# Patient Record
Sex: FEMALE | Race: BLACK OR AFRICAN AMERICAN | NOT HISPANIC OR LATINO | Employment: FULL TIME
[De-identification: names, ages, dates, MRNs, and addresses within clinical notes are randomized per-mention and may not be internally consistent; named-entity substitution may affect disease eponyms.]

---

## 2017-09-28 ENCOUNTER — COMMUNICATION - HEALTHEAST (OUTPATIENT)
Dept: INFUSION THERAPY | Age: 37
End: 2017-09-28

## 2017-09-28 ENCOUNTER — COMMUNICATION - HEALTHEAST (OUTPATIENT)
Dept: INFUSION THERAPY | Facility: HOSPITAL | Age: 37
End: 2017-09-28

## 2017-09-28 ENCOUNTER — INFUSION - HEALTHEAST (OUTPATIENT)
Dept: INFUSION THERAPY | Age: 37
End: 2017-09-28

## 2017-09-28 DIAGNOSIS — N93.9 VAGINAL BLEEDING: ICD-10-CM

## 2017-09-28 DIAGNOSIS — D64.89 ANEMIA DUE TO OTHER CAUSE: ICD-10-CM

## 2017-09-28 LAB — ANTIBODY SCREEN: NORMAL

## 2017-09-29 ENCOUNTER — INFUSION - HEALTHEAST (OUTPATIENT)
Dept: INFUSION THERAPY | Age: 37
End: 2017-09-29

## 2017-09-29 DIAGNOSIS — D64.89 ANEMIA DUE TO OTHER CAUSE: ICD-10-CM

## 2017-09-29 DIAGNOSIS — N93.9 VAGINAL BLEEDING: ICD-10-CM

## 2017-09-29 LAB
BLD PROD TYP BPU: NORMAL
BLOOD EXPIRATION DATE: NORMAL
BLOOD TYPE: 6200
CODING SYSTEM: NORMAL
COMPONENT (HISTORICAL CONVERSION): NORMAL
CROSSMATCH: NORMAL
ISSUE DATE AND TIME: NORMAL
STATUS (HISTORICAL CONVERSION): NORMAL
UNIT ABO/RH (HISTORICAL CONVERSION): NORMAL
UNIT NUMBER: NORMAL

## 2017-12-07 ENCOUNTER — RECORDS - HEALTHEAST (OUTPATIENT)
Dept: ADMINISTRATIVE | Facility: OTHER | Age: 37
End: 2017-12-07

## 2017-12-07 ENCOUNTER — OFFICE VISIT - HEALTHEAST (OUTPATIENT)
Dept: INTERNAL MEDICINE | Facility: CLINIC | Age: 37
End: 2017-12-07

## 2017-12-07 ENCOUNTER — AMBULATORY - HEALTHEAST (OUTPATIENT)
Dept: INTERNAL MEDICINE | Facility: CLINIC | Age: 37
End: 2017-12-07

## 2017-12-07 DIAGNOSIS — D50.9 MICROCYTIC ANEMIA: ICD-10-CM

## 2017-12-07 DIAGNOSIS — R30.9 PAINFUL URINATION: ICD-10-CM

## 2017-12-07 DIAGNOSIS — N92.0 HEAVY MENSES: ICD-10-CM

## 2017-12-07 DIAGNOSIS — Z72.0 TOBACCO ABUSE: ICD-10-CM

## 2017-12-07 DIAGNOSIS — D51.0 PERNICIOUS ANEMIA: ICD-10-CM

## 2017-12-07 ASSESSMENT — MIFFLIN-ST. JEOR: SCORE: 1325.86

## 2017-12-08 ENCOUNTER — AMBULATORY - HEALTHEAST (OUTPATIENT)
Dept: INTERNAL MEDICINE | Facility: CLINIC | Age: 37
End: 2017-12-08

## 2017-12-08 ENCOUNTER — AMBULATORY - HEALTHEAST (OUTPATIENT)
Dept: NURSING | Facility: CLINIC | Age: 37
End: 2017-12-08

## 2017-12-08 ENCOUNTER — COMMUNICATION - HEALTHEAST (OUTPATIENT)
Dept: TELEHEALTH | Facility: CLINIC | Age: 37
End: 2017-12-08

## 2017-12-08 DIAGNOSIS — D51.0 PERNICIOUS ANEMIA: ICD-10-CM

## 2017-12-11 ENCOUNTER — AMBULATORY - HEALTHEAST (OUTPATIENT)
Dept: NURSING | Facility: CLINIC | Age: 37
End: 2017-12-11

## 2017-12-11 LAB
TTG IGA SER-ACNC: 0.4 U/ML
TTG IGG SER-ACNC: <0.6 U/ML

## 2017-12-12 ENCOUNTER — AMBULATORY - HEALTHEAST (OUTPATIENT)
Dept: NURSING | Facility: CLINIC | Age: 37
End: 2017-12-12

## 2017-12-13 ENCOUNTER — AMBULATORY - HEALTHEAST (OUTPATIENT)
Dept: NURSING | Facility: CLINIC | Age: 37
End: 2017-12-13

## 2017-12-14 ENCOUNTER — COMMUNICATION - HEALTHEAST (OUTPATIENT)
Dept: INFUSION THERAPY | Age: 37
End: 2017-12-14

## 2017-12-14 ENCOUNTER — AMBULATORY - HEALTHEAST (OUTPATIENT)
Dept: NURSING | Facility: CLINIC | Age: 37
End: 2017-12-14

## 2017-12-14 ENCOUNTER — COMMUNICATION - HEALTHEAST (OUTPATIENT)
Dept: INTERNAL MEDICINE | Facility: CLINIC | Age: 37
End: 2017-12-14

## 2017-12-15 ENCOUNTER — AMBULATORY - HEALTHEAST (OUTPATIENT)
Dept: NURSING | Facility: CLINIC | Age: 37
End: 2017-12-15

## 2017-12-15 DIAGNOSIS — D51.0 PERNICIOUS ANEMIA: ICD-10-CM

## 2018-01-04 ENCOUNTER — AMBULATORY - HEALTHEAST (OUTPATIENT)
Dept: NURSING | Facility: CLINIC | Age: 38
End: 2018-01-04

## 2018-01-04 ENCOUNTER — AMBULATORY - HEALTHEAST (OUTPATIENT)
Dept: INTERNAL MEDICINE | Facility: CLINIC | Age: 38
End: 2018-01-04

## 2018-01-04 DIAGNOSIS — D51.0 PERNICIOUS ANEMIA: ICD-10-CM

## 2018-01-12 ENCOUNTER — AMBULATORY - HEALTHEAST (OUTPATIENT)
Dept: NURSING | Facility: CLINIC | Age: 38
End: 2018-01-12

## 2018-01-16 ENCOUNTER — OFFICE VISIT - HEALTHEAST (OUTPATIENT)
Dept: INTERNAL MEDICINE | Facility: CLINIC | Age: 38
End: 2018-01-16

## 2018-01-16 DIAGNOSIS — Z72.0 TOBACCO ABUSE: ICD-10-CM

## 2018-01-16 DIAGNOSIS — J06.9 URI (UPPER RESPIRATORY INFECTION): ICD-10-CM

## 2018-01-16 DIAGNOSIS — D51.0 PERNICIOUS ANEMIA: ICD-10-CM

## 2018-01-16 ASSESSMENT — MIFFLIN-ST. JEOR: SCORE: 1325.86

## 2018-04-02 ENCOUNTER — AMBULATORY - HEALTHEAST (OUTPATIENT)
Dept: NURSING | Facility: CLINIC | Age: 38
End: 2018-04-02

## 2018-04-02 ENCOUNTER — AMBULATORY - HEALTHEAST (OUTPATIENT)
Dept: INTERNAL MEDICINE | Facility: CLINIC | Age: 38
End: 2018-04-02

## 2018-04-02 ENCOUNTER — AMBULATORY - HEALTHEAST (OUTPATIENT)
Dept: LAB | Facility: CLINIC | Age: 38
End: 2018-04-02

## 2018-04-02 DIAGNOSIS — D64.9 ANEMIA: ICD-10-CM

## 2018-04-02 DIAGNOSIS — D50.9 IRON DEFICIENCY ANEMIA: ICD-10-CM

## 2018-04-02 LAB
BASOPHILS # BLD AUTO: 0 THOU/UL (ref 0–0.2)
BASOPHILS NFR BLD AUTO: 0 % (ref 0–2)
EOSINOPHIL # BLD AUTO: 0.1 THOU/UL (ref 0–0.4)
EOSINOPHIL NFR BLD AUTO: 3 % (ref 0–6)
ERYTHROCYTE [DISTWIDTH] IN BLOOD BY AUTOMATED COUNT: 18.9 % (ref 11–14.5)
FERRITIN SERPL-MCNC: 3 NG/ML (ref 10–130)
HCT VFR BLD AUTO: 22.6 % (ref 35–47)
HGB BLD-MCNC: 6.5 G/DL (ref 12–16)
LYMPHOCYTES # BLD AUTO: 1.9 THOU/UL (ref 0.8–4.4)
LYMPHOCYTES NFR BLD AUTO: 40 % (ref 20–40)
MCH RBC QN AUTO: 18.6 PG (ref 27–34)
MCHC RBC AUTO-ENTMCNC: 28.9 G/DL (ref 32–36)
MCV RBC AUTO: 64 FL (ref 80–100)
MONOCYTES # BLD AUTO: 0.5 THOU/UL (ref 0–0.9)
MONOCYTES NFR BLD AUTO: 10 % (ref 2–10)
NEUTROPHILS # BLD AUTO: 2.2 THOU/UL (ref 2–7.7)
NEUTROPHILS NFR BLD AUTO: 48 % (ref 50–70)
PLATELET # BLD AUTO: 314 THOU/UL (ref 140–440)
PMV BLD AUTO: 10 FL (ref 7–10)
RBC # BLD AUTO: 3.52 MILL/UL (ref 3.8–5.4)
WBC: 4.7 THOU/UL (ref 4–11)

## 2018-04-03 ENCOUNTER — COMMUNICATION - HEALTHEAST (OUTPATIENT)
Dept: INFUSION THERAPY | Facility: HOSPITAL | Age: 38
End: 2018-04-03

## 2018-04-04 LAB
GLIADIN IGA SER-ACNC: 6.9 U/ML
GLIADIN IGG SER-ACNC: <0.4 U/ML
IGA SERPL-MCNC: 247 MG/DL (ref 65–400)
TTG IGA SER-ACNC: 0.5 U/ML
TTG IGG SER-ACNC: <0.6 U/ML

## 2018-04-05 ENCOUNTER — COMMUNICATION - HEALTHEAST (OUTPATIENT)
Dept: ONCOLOGY | Facility: CLINIC | Age: 38
End: 2018-04-05

## 2018-04-05 ENCOUNTER — AMBULATORY - HEALTHEAST (OUTPATIENT)
Dept: INTERNAL MEDICINE | Facility: CLINIC | Age: 38
End: 2018-04-05

## 2018-05-14 ENCOUNTER — OFFICE VISIT - HEALTHEAST (OUTPATIENT)
Dept: INTERNAL MEDICINE | Facility: CLINIC | Age: 38
End: 2018-05-14

## 2018-05-14 ENCOUNTER — AMBULATORY - HEALTHEAST (OUTPATIENT)
Dept: INTERNAL MEDICINE | Facility: CLINIC | Age: 38
End: 2018-05-14

## 2018-05-14 DIAGNOSIS — T14.8XXA MUSCLE STRAIN: ICD-10-CM

## 2018-05-14 ASSESSMENT — MIFFLIN-ST. JEOR: SCORE: 1330.39

## 2018-06-14 ENCOUNTER — HOSPITAL ENCOUNTER (OUTPATIENT)
Dept: CT IMAGING | Facility: CLINIC | Age: 38
Discharge: HOME | End: 2018-06-14
Attending: INTERNAL MEDICINE

## 2018-06-14 ENCOUNTER — OFFICE VISIT - HEALTHEAST (OUTPATIENT)
Dept: INTERNAL MEDICINE | Facility: CLINIC | Age: 38
End: 2018-06-14

## 2018-06-14 DIAGNOSIS — R10.31 RLQ ABDOMINAL PAIN: ICD-10-CM

## 2018-06-14 LAB — WBC: 5.4 THOU/UL (ref 4–11)

## 2018-06-14 ASSESSMENT — MIFFLIN-ST. JEOR: SCORE: 1339.46

## 2018-07-09 ENCOUNTER — OFFICE VISIT - HEALTHEAST (OUTPATIENT)
Dept: INTERNAL MEDICINE | Facility: CLINIC | Age: 38
End: 2018-07-09

## 2018-07-09 DIAGNOSIS — K80.20 MULTIPLE GALLSTONES: ICD-10-CM

## 2018-07-09 DIAGNOSIS — R10.84 ABDOMINAL PAIN, GENERALIZED: ICD-10-CM

## 2018-07-09 LAB
ALBUMIN UR-MCNC: ABNORMAL MG/DL
APPEARANCE UR: ABNORMAL
BACTERIA #/AREA URNS HPF: ABNORMAL HPF
BILIRUB UR QL STRIP: ABNORMAL
COLOR UR AUTO: YELLOW
GLUCOSE UR STRIP-MCNC: NEGATIVE MG/DL
HGB UR QL STRIP: NEGATIVE
KETONES UR STRIP-MCNC: ABNORMAL MG/DL
LEUKOCYTE ESTERASE UR QL STRIP: ABNORMAL
MUCOUS THREADS #/AREA URNS LPF: ABNORMAL LPF
NITRATE UR QL: NEGATIVE
PH UR STRIP: 7 [PH] (ref 5–8)
RBC #/AREA URNS AUTO: ABNORMAL HPF
SP GR UR STRIP: 1.02 (ref 1–1.03)
SQUAMOUS #/AREA URNS AUTO: ABNORMAL LPF
UROBILINOGEN UR STRIP-ACNC: ABNORMAL
WBC #/AREA URNS AUTO: ABNORMAL HPF

## 2018-07-09 ASSESSMENT — MIFFLIN-ST. JEOR: SCORE: 1339.46

## 2018-07-10 ENCOUNTER — AMBULATORY - HEALTHEAST (OUTPATIENT)
Dept: NURSING | Facility: CLINIC | Age: 38
End: 2018-07-10

## 2018-07-10 LAB
ALBUMIN SERPL-MCNC: 3.9 G/DL (ref 3.5–5)
ALP SERPL-CCNC: 39 U/L (ref 45–120)
ALT SERPL W P-5'-P-CCNC: <9 U/L (ref 0–45)
AMYLASE SERPL-CCNC: 53 U/L (ref 5–120)
ANION GAP SERPL CALCULATED.3IONS-SCNC: 9 MMOL/L (ref 5–18)
AST SERPL W P-5'-P-CCNC: 12 U/L (ref 0–40)
BACTERIA SPEC CULT: NO GROWTH
BILIRUB DIRECT SERPL-MCNC: 0.1 MG/DL
BILIRUB SERPL-MCNC: 0.3 MG/DL (ref 0–1)
BUN SERPL-MCNC: 13 MG/DL (ref 8–22)
CALCIUM SERPL-MCNC: 9.3 MG/DL (ref 8.5–10.5)
CHLORIDE BLD-SCNC: 108 MMOL/L (ref 98–107)
CO2 SERPL-SCNC: 22 MMOL/L (ref 22–31)
CREAT SERPL-MCNC: 0.69 MG/DL (ref 0.6–1.1)
ERYTHROCYTE [DISTWIDTH] IN BLOOD BY AUTOMATED COUNT: 21.2 % (ref 11–14.5)
GFR SERPL CREATININE-BSD FRML MDRD: >60 ML/MIN/1.73M2
GLUCOSE BLD-MCNC: 90 MG/DL (ref 70–125)
HCT VFR BLD AUTO: 22.2 % (ref 35–47)
HGB BLD-MCNC: 5.9 G/DL (ref 12–16)
LIPASE SERPL-CCNC: 20 U/L (ref 0–52)
MCH RBC QN AUTO: 18.5 PG (ref 27–34)
MCHC RBC AUTO-ENTMCNC: 26.6 G/DL (ref 32–36)
MCV RBC AUTO: 70 FL (ref 80–100)
PLATELET # BLD AUTO: 235 THOU/UL (ref 140–440)
POTASSIUM BLD-SCNC: 3.9 MMOL/L (ref 3.5–5)
PROT SERPL-MCNC: 7 G/DL (ref 6–8)
RBC # BLD AUTO: 3.19 MILL/UL (ref 3.8–5.4)
SODIUM SERPL-SCNC: 139 MMOL/L (ref 136–145)
WBC: 3.9 THOU/UL (ref 4–11)

## 2018-07-11 ENCOUNTER — COMMUNICATION - HEALTHEAST (OUTPATIENT)
Dept: INTERNAL MEDICINE | Facility: CLINIC | Age: 38
End: 2018-07-11

## 2018-07-11 ENCOUNTER — AMBULATORY - HEALTHEAST (OUTPATIENT)
Dept: INTERNAL MEDICINE | Facility: CLINIC | Age: 38
End: 2018-07-11

## 2018-07-11 DIAGNOSIS — D51.0 PERNICIOUS ANEMIA: ICD-10-CM

## 2018-07-11 DIAGNOSIS — D50.9 IRON DEFICIENCY ANEMIA: ICD-10-CM

## 2018-07-11 LAB
FERRITIN SERPL-MCNC: 2 NG/ML (ref 10–130)
VIT B12 SERPL-MCNC: >2000 PG/ML (ref 213–816)

## 2018-07-13 ENCOUNTER — COMMUNICATION - HEALTHEAST (OUTPATIENT)
Dept: ONCOLOGY | Facility: CLINIC | Age: 38
End: 2018-07-13

## 2018-07-13 LAB — METHYLMALONATE SERPL-SCNC: 0.1 UMOL/L (ref 0–0.4)

## 2018-07-16 ENCOUNTER — RECORDS - HEALTHEAST (OUTPATIENT)
Dept: ADMINISTRATIVE | Facility: OTHER | Age: 38
End: 2018-07-16

## 2018-07-19 ENCOUNTER — COMMUNICATION - HEALTHEAST (OUTPATIENT)
Dept: ONCOLOGY | Facility: HOSPITAL | Age: 38
End: 2018-07-19

## 2018-07-31 ENCOUNTER — AMBULATORY - HEALTHEAST (OUTPATIENT)
Dept: NURSING | Facility: CLINIC | Age: 38
End: 2018-07-31

## 2018-08-03 ENCOUNTER — COMMUNICATION - HEALTHEAST (OUTPATIENT)
Dept: ADMINISTRATIVE | Facility: HOSPITAL | Age: 38
End: 2018-08-03

## 2018-08-09 ENCOUNTER — COMMUNICATION - HEALTHEAST (OUTPATIENT)
Dept: ADMINISTRATIVE | Facility: HOSPITAL | Age: 38
End: 2018-08-09

## 2018-09-07 ENCOUNTER — RECORDS - HEALTHEAST (OUTPATIENT)
Dept: ADMINISTRATIVE | Facility: OTHER | Age: 38
End: 2018-09-07

## 2018-09-13 ENCOUNTER — COMMUNICATION - HEALTHEAST (OUTPATIENT)
Dept: CARE COORDINATION | Facility: CLINIC | Age: 38
End: 2018-09-13

## 2018-09-17 ENCOUNTER — AMBULATORY - HEALTHEAST (OUTPATIENT)
Dept: ONCOLOGY | Facility: HOSPITAL | Age: 38
End: 2018-09-17

## 2018-09-19 ENCOUNTER — OFFICE VISIT - HEALTHEAST (OUTPATIENT)
Dept: INTERNAL MEDICINE | Facility: CLINIC | Age: 38
End: 2018-09-19

## 2018-09-19 DIAGNOSIS — D51.0 PERNICIOUS ANEMIA: ICD-10-CM

## 2018-09-19 DIAGNOSIS — D50.0 IRON DEFICIENCY ANEMIA DUE TO CHRONIC BLOOD LOSS: ICD-10-CM

## 2018-09-19 DIAGNOSIS — N92.0 HEAVY MENSES: ICD-10-CM

## 2018-09-19 LAB
HGB BLD-MCNC: 6 G/DL (ref 12–16)
RETICS # AUTO: 0.11 MILL/UL (ref 0.01–0.11)

## 2018-09-19 ASSESSMENT — MIFFLIN-ST. JEOR: SCORE: 1300.91

## 2018-10-08 ENCOUNTER — COMMUNICATION - HEALTHEAST (OUTPATIENT)
Dept: INTERNAL MEDICINE | Facility: CLINIC | Age: 38
End: 2018-10-08

## 2019-01-24 ENCOUNTER — OFFICE VISIT - HEALTHEAST (OUTPATIENT)
Dept: INTERNAL MEDICINE | Facility: CLINIC | Age: 39
End: 2019-01-24

## 2019-01-24 DIAGNOSIS — D64.9 SEVERE ANEMIA: ICD-10-CM

## 2019-01-24 DIAGNOSIS — R51.9 ACUTE INTRACTABLE HEADACHE, UNSPECIFIED HEADACHE TYPE: ICD-10-CM

## 2019-01-24 DIAGNOSIS — N92.1 MENOMETRORRHAGIA: ICD-10-CM

## 2019-01-24 LAB
BASOPHILS # BLD AUTO: 0 THOU/UL (ref 0–0.2)
BASOPHILS NFR BLD AUTO: 1 % (ref 0–2)
EOSINOPHIL # BLD AUTO: 0.1 THOU/UL (ref 0–0.4)
EOSINOPHIL NFR BLD AUTO: 3 % (ref 0–6)
ERYTHROCYTE [DISTWIDTH] IN BLOOD BY AUTOMATED COUNT: 20.3 % (ref 11–14.5)
FERRITIN SERPL-MCNC: <2 NG/ML (ref 10–130)
HCT VFR BLD AUTO: 23.8 % (ref 35–47)
HGB BLD-MCNC: 6.2 G/DL (ref 12–16)
LYMPHOCYTES # BLD AUTO: 1.4 THOU/UL (ref 0.8–4.4)
LYMPHOCYTES NFR BLD AUTO: 37 % (ref 20–40)
MCH RBC QN AUTO: 18.1 PG (ref 27–34)
MCHC RBC AUTO-ENTMCNC: 26.1 G/DL (ref 32–36)
MCV RBC AUTO: 69 FL (ref 80–100)
MONOCYTES # BLD AUTO: 0.5 THOU/UL (ref 0–0.9)
MONOCYTES NFR BLD AUTO: 13 % (ref 2–10)
NEUTROPHILS # BLD AUTO: 1.6 THOU/UL (ref 2–7.7)
NEUTROPHILS NFR BLD AUTO: 46 % (ref 50–70)
OVALOCYTES: ABNORMAL
PLAT MORPH BLD: NORMAL
PLATELET # BLD AUTO: 208 THOU/UL (ref 140–440)
PMV BLD AUTO: ABNORMAL FL (ref 8.5–12.5)
POLYCHROMASIA BLD QL SMEAR: ABNORMAL
RBC # BLD AUTO: 3.43 MILL/UL (ref 3.8–5.4)
TARGETS BLD QL SMEAR: ABNORMAL
TEAR DROP: ABNORMAL
VIT B12 SERPL-MCNC: 255 PG/ML (ref 213–816)
WBC: 3.6 THOU/UL (ref 4–11)

## 2019-01-24 ASSESSMENT — MIFFLIN-ST. JEOR: SCORE: 1300.91

## 2019-01-25 ENCOUNTER — COMMUNICATION - HEALTHEAST (OUTPATIENT)
Dept: INTERNAL MEDICINE | Facility: CLINIC | Age: 39
End: 2019-01-25

## 2019-02-13 ENCOUNTER — AMBULATORY - HEALTHEAST (OUTPATIENT)
Dept: INTERNAL MEDICINE | Facility: CLINIC | Age: 39
End: 2019-02-13

## 2019-02-13 DIAGNOSIS — E53.8 VITAMIN B12 DEFICIENCY (NON ANEMIC): ICD-10-CM

## 2019-02-13 DIAGNOSIS — E53.8 VITAMIN B12 DEFICIENCY: ICD-10-CM

## 2019-02-21 ENCOUNTER — COMMUNICATION - HEALTHEAST (OUTPATIENT)
Dept: INTERNAL MEDICINE | Facility: CLINIC | Age: 39
End: 2019-02-21

## 2019-02-21 DIAGNOSIS — D50.0 IRON DEFICIENCY ANEMIA DUE TO CHRONIC BLOOD LOSS: ICD-10-CM

## 2019-03-12 ENCOUNTER — OFFICE VISIT - HEALTHEAST (OUTPATIENT)
Dept: INTERNAL MEDICINE | Facility: CLINIC | Age: 39
End: 2019-03-12

## 2019-03-12 DIAGNOSIS — N92.0 MENORRHAGIA WITH REGULAR CYCLE: ICD-10-CM

## 2019-03-12 DIAGNOSIS — K90.9 IRON MALABSORPTION: ICD-10-CM

## 2019-03-12 DIAGNOSIS — D50.0 IRON DEFICIENCY ANEMIA DUE TO CHRONIC BLOOD LOSS: ICD-10-CM

## 2019-03-12 DIAGNOSIS — D51.0 PERNICIOUS ANEMIA: ICD-10-CM

## 2019-03-12 ASSESSMENT — MIFFLIN-ST. JEOR: SCORE: 1282.77

## 2019-03-14 ENCOUNTER — COMMUNICATION - HEALTHEAST (OUTPATIENT)
Dept: INFUSION THERAPY | Facility: HOSPITAL | Age: 39
End: 2019-03-14

## 2019-03-14 ENCOUNTER — INFUSION - HEALTHEAST (OUTPATIENT)
Dept: INFUSION THERAPY | Facility: HOSPITAL | Age: 39
End: 2019-03-14

## 2019-03-14 DIAGNOSIS — D50.0 IRON DEFICIENCY ANEMIA DUE TO CHRONIC BLOOD LOSS: ICD-10-CM

## 2019-03-14 LAB
ERYTHROCYTE [DISTWIDTH] IN BLOOD BY AUTOMATED COUNT: 20 % (ref 11–14.5)
HCT VFR BLD AUTO: 20.1 % (ref 35–47)
HGB BLD-MCNC: 5.3 G/DL (ref 12–16)
MCH RBC QN AUTO: 17.5 PG (ref 27–34)
MCHC RBC AUTO-ENTMCNC: 26.4 G/DL (ref 32–36)
MCV RBC AUTO: 67 FL (ref 80–100)
PLATELET # BLD AUTO: 183 THOU/UL (ref 140–440)
PMV BLD AUTO: 11 FL (ref 8.5–12.5)
RBC # BLD AUTO: 3.02 MILL/UL (ref 3.8–5.4)
WBC: 3.6 THOU/UL (ref 4–11)

## 2019-03-14 ASSESSMENT — MIFFLIN-ST. JEOR: SCORE: 1308.85

## 2019-03-15 ENCOUNTER — INFUSION - HEALTHEAST (OUTPATIENT)
Dept: INFUSION THERAPY | Facility: HOSPITAL | Age: 39
End: 2019-03-15

## 2019-03-15 ENCOUNTER — OFFICE VISIT - HEALTHEAST (OUTPATIENT)
Dept: ONCOLOGY | Facility: HOSPITAL | Age: 39
End: 2019-03-15

## 2019-03-15 DIAGNOSIS — D50.0 IRON DEFICIENCY ANEMIA DUE TO CHRONIC BLOOD LOSS: ICD-10-CM

## 2019-03-15 DIAGNOSIS — K90.9 IRON MALABSORPTION: ICD-10-CM

## 2019-03-15 DIAGNOSIS — T50.995A ADVERSE EFFECT OF OTHER DRUGS, MEDICAMENTS AND BIOLOGICAL SUBSTANCES, INITIAL ENCOUNTER: ICD-10-CM

## 2019-03-15 DIAGNOSIS — D50.9 MICROCYTIC ANEMIA: ICD-10-CM

## 2019-03-15 LAB
FERRITIN SERPL-MCNC: 28 NG/ML (ref 10–130)
IRON SATN MFR SERPL: 14 % (ref 20–50)
IRON SERPL-MCNC: 66 UG/DL (ref 42–175)
TIBC SERPL-MCNC: 456 UG/DL (ref 313–563)
TRANSFERRIN SERPL-MCNC: 364 MG/DL (ref 212–360)

## 2019-03-19 ENCOUNTER — OFFICE VISIT - HEALTHEAST (OUTPATIENT)
Dept: INTERNAL MEDICINE | Facility: CLINIC | Age: 39
End: 2019-03-19

## 2019-03-19 ENCOUNTER — INFUSION - HEALTHEAST (OUTPATIENT)
Dept: INFUSION THERAPY | Facility: HOSPITAL | Age: 39
End: 2019-03-19

## 2019-03-19 DIAGNOSIS — D50.0 IRON DEFICIENCY ANEMIA DUE TO CHRONIC BLOOD LOSS: ICD-10-CM

## 2019-03-19 DIAGNOSIS — D64.9 SEVERE ANEMIA: ICD-10-CM

## 2019-03-19 DIAGNOSIS — D51.0 PERNICIOUS ANEMIA: ICD-10-CM

## 2019-03-19 DIAGNOSIS — T50.995A ADVERSE EFFECT OF OTHER DRUGS, MEDICAMENTS AND BIOLOGICAL SUBSTANCES, INITIAL ENCOUNTER: ICD-10-CM

## 2019-03-19 DIAGNOSIS — K90.9 IRON MALABSORPTION: ICD-10-CM

## 2019-03-19 ASSESSMENT — MIFFLIN-ST. JEOR: SCORE: 1297.51

## 2019-03-21 ENCOUNTER — INFUSION - HEALTHEAST (OUTPATIENT)
Dept: INFUSION THERAPY | Facility: HOSPITAL | Age: 39
End: 2019-03-21

## 2019-03-21 DIAGNOSIS — T50.995A ADVERSE EFFECT OF OTHER DRUGS, MEDICAMENTS AND BIOLOGICAL SUBSTANCES, INITIAL ENCOUNTER: ICD-10-CM

## 2019-03-21 DIAGNOSIS — D50.0 IRON DEFICIENCY ANEMIA DUE TO CHRONIC BLOOD LOSS: ICD-10-CM

## 2019-03-21 DIAGNOSIS — K90.9 IRON MALABSORPTION: ICD-10-CM

## 2019-03-22 ENCOUNTER — AMBULATORY - HEALTHEAST (OUTPATIENT)
Dept: ONCOLOGY | Facility: HOSPITAL | Age: 39
End: 2019-03-22

## 2019-03-25 ENCOUNTER — INFUSION - HEALTHEAST (OUTPATIENT)
Dept: INFUSION THERAPY | Facility: HOSPITAL | Age: 39
End: 2019-03-25

## 2019-03-25 DIAGNOSIS — K90.9 IRON MALABSORPTION: ICD-10-CM

## 2019-03-25 DIAGNOSIS — D50.0 IRON DEFICIENCY ANEMIA DUE TO CHRONIC BLOOD LOSS: ICD-10-CM

## 2019-03-25 DIAGNOSIS — T50.995A ADVERSE EFFECT OF OTHER DRUGS, MEDICAMENTS AND BIOLOGICAL SUBSTANCES, INITIAL ENCOUNTER: ICD-10-CM

## 2019-03-26 ENCOUNTER — COMMUNICATION - HEALTHEAST (OUTPATIENT)
Dept: ADMINISTRATIVE | Facility: HOSPITAL | Age: 39
End: 2019-03-26

## 2019-03-26 ENCOUNTER — COMMUNICATION - HEALTHEAST (OUTPATIENT)
Dept: INTERNAL MEDICINE | Facility: CLINIC | Age: 39
End: 2019-03-26

## 2019-04-01 ENCOUNTER — INFUSION - HEALTHEAST (OUTPATIENT)
Dept: INFUSION THERAPY | Facility: HOSPITAL | Age: 39
End: 2019-04-01

## 2019-04-01 DIAGNOSIS — T50.995A ADVERSE EFFECT OF OTHER DRUGS, MEDICAMENTS AND BIOLOGICAL SUBSTANCES, INITIAL ENCOUNTER: ICD-10-CM

## 2019-04-01 DIAGNOSIS — D50.0 IRON DEFICIENCY ANEMIA DUE TO CHRONIC BLOOD LOSS: ICD-10-CM

## 2019-04-01 DIAGNOSIS — K90.9 IRON MALABSORPTION: ICD-10-CM

## 2019-04-03 ENCOUNTER — COMMUNICATION - HEALTHEAST (OUTPATIENT)
Dept: INTERNAL MEDICINE | Facility: CLINIC | Age: 39
End: 2019-04-03

## 2019-04-04 ENCOUNTER — COMMUNICATION - HEALTHEAST (OUTPATIENT)
Dept: ADMINISTRATIVE | Facility: HOSPITAL | Age: 39
End: 2019-04-04

## 2019-04-05 ENCOUNTER — AMBULATORY - HEALTHEAST (OUTPATIENT)
Dept: LAB | Facility: CLINIC | Age: 39
End: 2019-04-05

## 2019-04-05 DIAGNOSIS — D50.9 MICROCYTIC ANEMIA: ICD-10-CM

## 2019-04-05 LAB
ERYTHROCYTE [DISTWIDTH] IN BLOOD BY AUTOMATED COUNT: ABNORMAL % (ref 11–14.5)
FERRITIN SERPL-MCNC: 191 NG/ML (ref 10–130)
HCT VFR BLD AUTO: 33.2 % (ref 35–47)
HGB BLD-MCNC: 9.2 G/DL (ref 12–16)
IRON SATN MFR SERPL: 16 % (ref 20–50)
IRON SERPL-MCNC: 59 UG/DL (ref 42–175)
MCH RBC QN AUTO: 23.6 PG (ref 27–34)
MCHC RBC AUTO-ENTMCNC: 27.7 G/DL (ref 32–36)
MCV RBC AUTO: 85 FL (ref 80–100)
PLATELET # BLD AUTO: 224 THOU/UL (ref 140–440)
RBC # BLD AUTO: 3.9 MILL/UL (ref 3.8–5.4)
RETICS # AUTO: 0.05 MILL/UL (ref 0.01–0.11)
TIBC SERPL-MCNC: 365 UG/DL (ref 313–563)
TRANSFERRIN SERPL-MCNC: 292 MG/DL (ref 212–360)
WBC: 3.2 THOU/UL (ref 4–11)

## 2019-04-06 LAB — IF BLOCK AB SER QL RIA: POSITIVE

## 2019-04-10 ENCOUNTER — OFFICE VISIT - HEALTHEAST (OUTPATIENT)
Dept: INTERNAL MEDICINE | Facility: CLINIC | Age: 39
End: 2019-04-10

## 2019-04-10 DIAGNOSIS — Z86.39 HISTORY OF HYPERTHYROIDISM: ICD-10-CM

## 2019-04-10 DIAGNOSIS — N92.0 MENORRHAGIA WITH REGULAR CYCLE: ICD-10-CM

## 2019-04-10 DIAGNOSIS — D50.0 IRON DEFICIENCY ANEMIA DUE TO CHRONIC BLOOD LOSS: ICD-10-CM

## 2019-04-10 DIAGNOSIS — D51.0 PERNICIOUS ANEMIA: ICD-10-CM

## 2019-04-10 ASSESSMENT — MIFFLIN-ST. JEOR: SCORE: 1297.51

## 2019-04-12 ENCOUNTER — OFFICE VISIT - HEALTHEAST (OUTPATIENT)
Dept: ONCOLOGY | Facility: HOSPITAL | Age: 39
End: 2019-04-12

## 2019-04-12 DIAGNOSIS — D51.0 PERNICIOUS ANEMIA: ICD-10-CM

## 2019-04-12 DIAGNOSIS — D50.0 IRON DEFICIENCY ANEMIA DUE TO CHRONIC BLOOD LOSS: ICD-10-CM

## 2019-05-01 ENCOUNTER — AMBULATORY - HEALTHEAST (OUTPATIENT)
Dept: INTERNAL MEDICINE | Facility: CLINIC | Age: 39
End: 2019-05-01

## 2019-05-01 ENCOUNTER — AMBULATORY - HEALTHEAST (OUTPATIENT)
Dept: NURSING | Facility: CLINIC | Age: 39
End: 2019-05-01

## 2019-05-01 DIAGNOSIS — R51.9 ACUTE INTRACTABLE HEADACHE, UNSPECIFIED HEADACHE TYPE: ICD-10-CM

## 2019-05-03 ENCOUNTER — RECORDS - HEALTHEAST (OUTPATIENT)
Dept: ADMINISTRATIVE | Facility: OTHER | Age: 39
End: 2019-05-03

## 2019-05-09 ENCOUNTER — RECORDS - HEALTHEAST (OUTPATIENT)
Dept: ADMINISTRATIVE | Facility: OTHER | Age: 39
End: 2019-05-09

## 2019-05-28 ENCOUNTER — OFFICE VISIT - HEALTHEAST (OUTPATIENT)
Dept: INTERNAL MEDICINE | Facility: CLINIC | Age: 39
End: 2019-05-28

## 2019-05-28 DIAGNOSIS — D50.0 IRON DEFICIENCY ANEMIA DUE TO CHRONIC BLOOD LOSS: ICD-10-CM

## 2019-05-28 DIAGNOSIS — R05.9 COUGH: ICD-10-CM

## 2019-05-28 DIAGNOSIS — Z86.39 HISTORY OF HYPERTHYROIDISM: ICD-10-CM

## 2019-05-28 DIAGNOSIS — Z72.0 TOBACCO ABUSE: ICD-10-CM

## 2019-05-28 DIAGNOSIS — D51.0 PERNICIOUS ANEMIA: ICD-10-CM

## 2019-05-28 LAB
ERYTHROCYTE [DISTWIDTH] IN BLOOD BY AUTOMATED COUNT: 14.7 % (ref 11–14.5)
HCT VFR BLD AUTO: 34 % (ref 35–47)
HGB BLD-MCNC: 11 G/DL (ref 12–16)
MCH RBC QN AUTO: 27.5 PG (ref 27–34)
MCHC RBC AUTO-ENTMCNC: 32.2 G/DL (ref 32–36)
MCV RBC AUTO: 85 FL (ref 80–100)
PLATELET # BLD AUTO: 175 THOU/UL (ref 140–440)
PMV BLD AUTO: 9.6 FL (ref 7–10)
RBC # BLD AUTO: 3.99 MILL/UL (ref 3.8–5.4)
T3 SERPL-MCNC: 133 NG/DL (ref 45–175)
T4 FREE SERPL-MCNC: 0.8 NG/DL (ref 0.7–1.8)
TSH SERPL DL<=0.005 MIU/L-ACNC: 1.54 UIU/ML (ref 0.3–5)
WBC: 3.8 THOU/UL (ref 4–11)

## 2019-05-28 ASSESSMENT — MIFFLIN-ST. JEOR: SCORE: 1351.94

## 2019-05-29 ENCOUNTER — COMMUNICATION - HEALTHEAST (OUTPATIENT)
Dept: INTERNAL MEDICINE | Facility: CLINIC | Age: 39
End: 2019-05-29

## 2019-08-15 ENCOUNTER — COMMUNICATION - HEALTHEAST (OUTPATIENT)
Dept: INTERNAL MEDICINE | Facility: CLINIC | Age: 39
End: 2019-08-15

## 2019-08-15 DIAGNOSIS — B96.89 BACTERIAL VAGINOSIS: ICD-10-CM

## 2019-08-15 DIAGNOSIS — N76.0 BACTERIAL VAGINOSIS: ICD-10-CM

## 2019-09-25 ENCOUNTER — OFFICE VISIT - HEALTHEAST (OUTPATIENT)
Dept: INTERNAL MEDICINE | Facility: CLINIC | Age: 39
End: 2019-09-25

## 2019-09-25 DIAGNOSIS — F43.21 GRIEF REACTION: ICD-10-CM

## 2019-09-25 ASSESSMENT — MIFFLIN-ST. JEOR: SCORE: 1347.4

## 2019-09-26 ENCOUNTER — OFFICE VISIT - HEALTHEAST (OUTPATIENT)
Dept: BEHAVIORAL HEALTH | Facility: CLINIC | Age: 39
End: 2019-09-26

## 2019-09-26 DIAGNOSIS — F43.23 ADJUSTMENT DISORDER WITH MIXED ANXIETY AND DEPRESSED MOOD: ICD-10-CM

## 2019-09-26 ASSESSMENT — ANXIETY QUESTIONNAIRES
4. TROUBLE RELAXING: NEARLY EVERY DAY
IF YOU CHECKED OFF ANY PROBLEMS ON THIS QUESTIONNAIRE, HOW DIFFICULT HAVE THESE PROBLEMS MADE IT FOR YOU TO DO YOUR WORK, TAKE CARE OF THINGS AT HOME, OR GET ALONG WITH OTHER PEOPLE: EXTREMELY DIFFICULT
5. BEING SO RESTLESS THAT IT IS HARD TO SIT STILL: NEARLY EVERY DAY
6. BECOMING EASILY ANNOYED OR IRRITABLE: NEARLY EVERY DAY
3. WORRYING TOO MUCH ABOUT DIFFERENT THINGS: NEARLY EVERY DAY
7. FEELING AFRAID AS IF SOMETHING AWFUL MIGHT HAPPEN: NEARLY EVERY DAY
1. FEELING NERVOUS, ANXIOUS, OR ON EDGE: NEARLY EVERY DAY
2. NOT BEING ABLE TO STOP OR CONTROL WORRYING: NEARLY EVERY DAY
GAD7 TOTAL SCORE: 21

## 2019-09-26 ASSESSMENT — PATIENT HEALTH QUESTIONNAIRE - PHQ9: SUM OF ALL RESPONSES TO PHQ QUESTIONS 1-9: 27

## 2019-10-03 ENCOUNTER — OFFICE VISIT - HEALTHEAST (OUTPATIENT)
Dept: BEHAVIORAL HEALTH | Facility: CLINIC | Age: 39
End: 2019-10-03

## 2019-10-03 DIAGNOSIS — F43.23 ADJUSTMENT DISORDER WITH MIXED ANXIETY AND DEPRESSED MOOD: ICD-10-CM

## 2019-10-09 ENCOUNTER — AMBULATORY - HEALTHEAST (OUTPATIENT)
Dept: BEHAVIORAL HEALTH | Facility: CLINIC | Age: 39
End: 2019-10-09

## 2019-10-23 ENCOUNTER — OFFICE VISIT - HEALTHEAST (OUTPATIENT)
Dept: BEHAVIORAL HEALTH | Facility: CLINIC | Age: 39
End: 2019-10-23

## 2019-10-23 DIAGNOSIS — F43.23 ADJUSTMENT DISORDER WITH MIXED ANXIETY AND DEPRESSED MOOD: ICD-10-CM

## 2019-10-31 ENCOUNTER — OFFICE VISIT - HEALTHEAST (OUTPATIENT)
Dept: BEHAVIORAL HEALTH | Facility: CLINIC | Age: 39
End: 2019-10-31

## 2019-10-31 ENCOUNTER — COMMUNICATION - HEALTHEAST (OUTPATIENT)
Dept: BEHAVIORAL HEALTH | Facility: CLINIC | Age: 39
End: 2019-10-31

## 2019-10-31 DIAGNOSIS — F43.23 ADJUSTMENT DISORDER WITH MIXED ANXIETY AND DEPRESSED MOOD: ICD-10-CM

## 2019-11-06 ENCOUNTER — OFFICE VISIT - HEALTHEAST (OUTPATIENT)
Dept: BEHAVIORAL HEALTH | Facility: CLINIC | Age: 39
End: 2019-11-06

## 2019-11-06 DIAGNOSIS — F43.23 ADJUSTMENT DISORDER WITH MIXED ANXIETY AND DEPRESSED MOOD: ICD-10-CM

## 2019-11-06 DIAGNOSIS — F43.10 POSTTRAUMATIC STRESS DISORDER: ICD-10-CM

## 2019-11-08 ENCOUNTER — COMMUNICATION - HEALTHEAST (OUTPATIENT)
Dept: BEHAVIORAL HEALTH | Facility: CLINIC | Age: 39
End: 2019-11-08

## 2019-11-14 ENCOUNTER — OFFICE VISIT - HEALTHEAST (OUTPATIENT)
Dept: BEHAVIORAL HEALTH | Facility: CLINIC | Age: 39
End: 2019-11-14

## 2019-11-14 ENCOUNTER — AMBULATORY - HEALTHEAST (OUTPATIENT)
Dept: BEHAVIORAL HEALTH | Facility: CLINIC | Age: 39
End: 2019-11-14

## 2019-11-14 DIAGNOSIS — F43.10 POSTTRAUMATIC STRESS DISORDER: ICD-10-CM

## 2019-11-14 DIAGNOSIS — F43.23 ADJUSTMENT DISORDER WITH MIXED ANXIETY AND DEPRESSED MOOD: ICD-10-CM

## 2019-11-20 ENCOUNTER — OFFICE VISIT - HEALTHEAST (OUTPATIENT)
Dept: BEHAVIORAL HEALTH | Facility: CLINIC | Age: 39
End: 2019-11-20

## 2019-11-20 DIAGNOSIS — F43.10 POSTTRAUMATIC STRESS DISORDER: ICD-10-CM

## 2019-11-20 DIAGNOSIS — F43.23 ADJUSTMENT DISORDER WITH MIXED ANXIETY AND DEPRESSED MOOD: ICD-10-CM

## 2019-11-20 DIAGNOSIS — F33.1 DEPRESSION, MAJOR, RECURRENT, MODERATE (H): ICD-10-CM

## 2019-12-05 ENCOUNTER — COMMUNICATION - HEALTHEAST (OUTPATIENT)
Dept: BEHAVIORAL HEALTH | Facility: CLINIC | Age: 39
End: 2019-12-05

## 2019-12-05 ENCOUNTER — OFFICE VISIT - HEALTHEAST (OUTPATIENT)
Dept: BEHAVIORAL HEALTH | Facility: CLINIC | Age: 39
End: 2019-12-05

## 2019-12-05 DIAGNOSIS — F43.23 ADJUSTMENT DISORDER WITH MIXED ANXIETY AND DEPRESSED MOOD: ICD-10-CM

## 2019-12-05 DIAGNOSIS — F43.10 POSTTRAUMATIC STRESS DISORDER: ICD-10-CM

## 2019-12-05 DIAGNOSIS — F33.1 DEPRESSION, MAJOR, RECURRENT, MODERATE (H): ICD-10-CM

## 2020-03-06 ENCOUNTER — AMBULATORY - HEALTHEAST (OUTPATIENT)
Dept: LAB | Facility: CLINIC | Age: 40
End: 2020-03-06

## 2020-03-06 ENCOUNTER — COMMUNICATION - HEALTHEAST (OUTPATIENT)
Dept: INTERNAL MEDICINE | Facility: CLINIC | Age: 40
End: 2020-03-06

## 2020-03-06 ENCOUNTER — AMBULATORY - HEALTHEAST (OUTPATIENT)
Dept: INTERNAL MEDICINE | Facility: CLINIC | Age: 40
End: 2020-03-06

## 2020-03-06 DIAGNOSIS — R35.0 URINARY FREQUENCY: ICD-10-CM

## 2020-03-06 LAB
ALBUMIN UR-MCNC: ABNORMAL MG/DL
APPEARANCE UR: ABNORMAL
BACTERIA #/AREA URNS HPF: ABNORMAL HPF
BILIRUB UR QL STRIP: NEGATIVE
COLOR UR AUTO: YELLOW
GLUCOSE UR STRIP-MCNC: NEGATIVE MG/DL
HGB UR QL STRIP: ABNORMAL
KETONES UR STRIP-MCNC: ABNORMAL MG/DL
LEUKOCYTE ESTERASE UR QL STRIP: NEGATIVE
MUCOUS THREADS #/AREA URNS LPF: ABNORMAL LPF
NITRATE UR QL: NEGATIVE
PH UR STRIP: 6 [PH] (ref 5–8)
RBC #/AREA URNS AUTO: ABNORMAL HPF
SP GR UR STRIP: >=1.03 (ref 1–1.03)
SQUAMOUS #/AREA URNS AUTO: ABNORMAL LPF
UROBILINOGEN UR STRIP-ACNC: ABNORMAL
WBC #/AREA URNS AUTO: ABNORMAL HPF

## 2020-04-17 ENCOUNTER — OFFICE VISIT - HEALTHEAST (OUTPATIENT)
Dept: FAMILY MEDICINE | Facility: CLINIC | Age: 40
End: 2020-04-17

## 2020-04-17 DIAGNOSIS — R05.9 COUGH: ICD-10-CM

## 2020-04-18 ENCOUNTER — RECORDS - HEALTHEAST (OUTPATIENT)
Dept: LAB | Facility: CLINIC | Age: 40
End: 2020-04-18

## 2020-04-18 LAB
SARS-COV-2 PCR COMMENT: NORMAL
SARS-COV-2 RNA SPEC QL NAA+PROBE: NEGATIVE
SARS-COV-2 VIRUS SPECIMEN SOURCE: NORMAL

## 2020-05-22 ENCOUNTER — COMMUNICATION - HEALTHEAST (OUTPATIENT)
Dept: INTERNAL MEDICINE | Facility: CLINIC | Age: 40
End: 2020-05-22

## 2020-06-23 ENCOUNTER — OFFICE VISIT - HEALTHEAST (OUTPATIENT)
Dept: INTERNAL MEDICINE | Facility: CLINIC | Age: 40
End: 2020-06-23

## 2020-06-23 ENCOUNTER — COMMUNICATION - HEALTHEAST (OUTPATIENT)
Dept: ADMINISTRATIVE | Facility: CLINIC | Age: 40
End: 2020-06-23

## 2020-06-23 DIAGNOSIS — E05.00 GRAVES DISEASE: ICD-10-CM

## 2020-06-23 ASSESSMENT — PATIENT HEALTH QUESTIONNAIRE - PHQ9: SUM OF ALL RESPONSES TO PHQ QUESTIONS 1-9: 24

## 2020-06-25 ENCOUNTER — COMMUNICATION - HEALTHEAST (OUTPATIENT)
Dept: NURSING | Facility: CLINIC | Age: 40
End: 2020-06-25

## 2020-06-29 ENCOUNTER — OFFICE VISIT - HEALTHEAST (OUTPATIENT)
Dept: INTERNAL MEDICINE | Facility: CLINIC | Age: 40
End: 2020-06-29

## 2020-06-29 DIAGNOSIS — N92.2 EXCESSIVE MENSTRUATION AT PUBERTY: ICD-10-CM

## 2020-06-29 DIAGNOSIS — E05.90 HYPERTHYROIDISM: ICD-10-CM

## 2020-06-29 DIAGNOSIS — R11.2 NAUSEA AND VOMITING, INTRACTABILITY OF VOMITING NOT SPECIFIED, UNSPECIFIED VOMITING TYPE: ICD-10-CM

## 2020-06-29 DIAGNOSIS — D50.0 IRON DEFICIENCY ANEMIA DUE TO CHRONIC BLOOD LOSS: ICD-10-CM

## 2020-06-29 LAB
ALBUMIN SERPL-MCNC: 3.9 G/DL (ref 3.5–5)
ALP SERPL-CCNC: 38 U/L (ref 45–120)
ALT SERPL W P-5'-P-CCNC: 95 U/L (ref 0–45)
ANION GAP SERPL CALCULATED.3IONS-SCNC: 9 MMOL/L (ref 5–18)
AST SERPL W P-5'-P-CCNC: 56 U/L (ref 0–40)
BASOPHILS # BLD AUTO: 0 THOU/UL (ref 0–0.2)
BASOPHILS NFR BLD AUTO: 0 % (ref 0–2)
BILIRUB SERPL-MCNC: 0.2 MG/DL (ref 0–1)
BUN SERPL-MCNC: 13 MG/DL (ref 8–22)
CALCIUM SERPL-MCNC: 9.3 MG/DL (ref 8.5–10.5)
CHLORIDE BLD-SCNC: 106 MMOL/L (ref 98–107)
CO2 SERPL-SCNC: 26 MMOL/L (ref 22–31)
CREAT SERPL-MCNC: 0.68 MG/DL (ref 0.6–1.1)
EOSINOPHIL # BLD AUTO: 0.1 THOU/UL (ref 0–0.4)
EOSINOPHIL NFR BLD AUTO: 2 % (ref 0–6)
ERYTHROCYTE [DISTWIDTH] IN BLOOD BY AUTOMATED COUNT: ABNORMAL %
GFR SERPL CREATININE-BSD FRML MDRD: >60 ML/MIN/1.73M2
GLUCOSE BLD-MCNC: 89 MG/DL (ref 70–125)
HCT VFR BLD AUTO: 31.5 % (ref 35–47)
HGB BLD-MCNC: 8.5 G/DL (ref 12–16)
LYMPHOCYTES # BLD AUTO: 1.2 THOU/UL (ref 0.8–4.4)
LYMPHOCYTES NFR BLD AUTO: 26 % (ref 20–40)
MCH RBC QN AUTO: 23.2 PG (ref 27–34)
MCHC RBC AUTO-ENTMCNC: 27 G/DL (ref 32–36)
MCV RBC AUTO: 86 FL (ref 80–100)
MONOCYTES # BLD AUTO: 0.4 THOU/UL (ref 0–0.9)
MONOCYTES NFR BLD AUTO: 9 % (ref 2–10)
NEUTROPHILS # BLD AUTO: 2.9 THOU/UL (ref 2–7.7)
NEUTROPHILS NFR BLD AUTO: 62 % (ref 50–70)
PLATELET # BLD AUTO: 260 THOU/UL (ref 140–440)
PMV BLD AUTO: ABNORMAL FL
POTASSIUM BLD-SCNC: 4.5 MMOL/L (ref 3.5–5)
PROT SERPL-MCNC: 6.6 G/DL (ref 6–8)
RBC # BLD AUTO: 3.66 MILL/UL (ref 3.8–5.4)
SODIUM SERPL-SCNC: 141 MMOL/L (ref 136–145)
T3 SERPL-MCNC: 166 NG/DL (ref 45–175)
T4 FREE SERPL-MCNC: 1.2 NG/DL (ref 0.7–1.8)
TSH SERPL DL<=0.005 MIU/L-ACNC: 0.02 UIU/ML (ref 0.3–5)
WBC: 4.7 THOU/UL (ref 4–11)

## 2020-06-29 ASSESSMENT — MIFFLIN-ST. JEOR: SCORE: 1303.18

## 2020-06-30 LAB — THYROID PEROXIDASE ANTIBODIES - HISTORICAL: 1207.3 IU/ML (ref 0–5.6)

## 2020-07-06 ENCOUNTER — AMBULATORY - HEALTHEAST (OUTPATIENT)
Dept: INTERNAL MEDICINE | Facility: CLINIC | Age: 40
End: 2020-07-06

## 2020-07-06 ENCOUNTER — COMMUNICATION - HEALTHEAST (OUTPATIENT)
Dept: INTERNAL MEDICINE | Facility: CLINIC | Age: 40
End: 2020-07-06

## 2020-07-06 DIAGNOSIS — E05.90 HYPERTHYROIDISM: ICD-10-CM

## 2020-08-04 ENCOUNTER — COMMUNICATION - HEALTHEAST (OUTPATIENT)
Dept: INTERNAL MEDICINE | Facility: CLINIC | Age: 40
End: 2020-08-04

## 2020-08-04 DIAGNOSIS — K04.7 TOOTH INFECTION: ICD-10-CM

## 2020-08-12 ENCOUNTER — AMBULATORY - HEALTHEAST (OUTPATIENT)
Dept: BEHAVIORAL HEALTH | Facility: CLINIC | Age: 40
End: 2020-08-12

## 2020-09-16 ENCOUNTER — OFFICE VISIT - HEALTHEAST (OUTPATIENT)
Dept: INTERNAL MEDICINE | Facility: CLINIC | Age: 40
End: 2020-09-16

## 2020-09-16 DIAGNOSIS — E05.90 HYPERTHYROIDISM: ICD-10-CM

## 2020-09-16 DIAGNOSIS — L30.9 DERMATITIS: ICD-10-CM

## 2020-10-05 ENCOUNTER — AMBULATORY - HEALTHEAST (OUTPATIENT)
Dept: ULTRASOUND IMAGING | Facility: HOSPITAL | Age: 40
End: 2020-10-05

## 2020-10-05 ENCOUNTER — OFFICE VISIT - HEALTHEAST (OUTPATIENT)
Dept: INTERNAL MEDICINE | Facility: CLINIC | Age: 40
End: 2020-10-05

## 2020-10-05 DIAGNOSIS — E05.90 HYPERTHYROIDISM: ICD-10-CM

## 2020-10-05 DIAGNOSIS — Z11.59 ENCOUNTER FOR SCREENING FOR OTHER VIRAL DISEASES: ICD-10-CM

## 2020-10-05 LAB
ALBUMIN SERPL-MCNC: 4.7 G/DL (ref 3.5–5)
ALP SERPL-CCNC: 45 U/L (ref 45–120)
ALT SERPL W P-5'-P-CCNC: 15 U/L (ref 0–45)
ANION GAP SERPL CALCULATED.3IONS-SCNC: 14 MMOL/L (ref 5–18)
AST SERPL W P-5'-P-CCNC: 18 U/L (ref 0–40)
BASOPHILS # BLD AUTO: 0 THOU/UL (ref 0–0.2)
BASOPHILS NFR BLD AUTO: 1 % (ref 0–2)
BILIRUB SERPL-MCNC: 0.3 MG/DL (ref 0–1)
BUN SERPL-MCNC: 13 MG/DL (ref 8–22)
CALCIUM SERPL-MCNC: 9.4 MG/DL (ref 8.5–10.5)
CHLORIDE BLD-SCNC: 105 MMOL/L (ref 98–107)
CO2 SERPL-SCNC: 22 MMOL/L (ref 22–31)
CREAT SERPL-MCNC: 0.71 MG/DL (ref 0.6–1.1)
EOSINOPHIL # BLD AUTO: 0.1 THOU/UL (ref 0–0.4)
EOSINOPHIL NFR BLD AUTO: 2 % (ref 0–6)
ERYTHROCYTE [DISTWIDTH] IN BLOOD BY AUTOMATED COUNT: 14.5 % (ref 11–14.5)
GFR SERPL CREATININE-BSD FRML MDRD: >60 ML/MIN/1.73M2
GLUCOSE BLD-MCNC: 80 MG/DL (ref 70–125)
HCT VFR BLD AUTO: 35.5 % (ref 35–47)
HGB BLD-MCNC: 11.6 G/DL (ref 12–16)
IMM GRANULOCYTES # BLD: 0 THOU/UL
IMM GRANULOCYTES NFR BLD: 0 %
LYMPHOCYTES # BLD AUTO: 1.5 THOU/UL (ref 0.8–4.4)
LYMPHOCYTES NFR BLD AUTO: 41 % (ref 20–40)
MCH RBC QN AUTO: 30.4 PG (ref 27–34)
MCHC RBC AUTO-ENTMCNC: 32.7 G/DL (ref 32–36)
MCV RBC AUTO: 93 FL (ref 80–100)
MONOCYTES # BLD AUTO: 0.3 THOU/UL (ref 0–0.9)
MONOCYTES NFR BLD AUTO: 8 % (ref 2–10)
NEUTROPHILS # BLD AUTO: 1.8 THOU/UL (ref 2–7.7)
NEUTROPHILS NFR BLD AUTO: 49 % (ref 50–70)
PLATELET # BLD AUTO: 226 THOU/UL (ref 140–440)
PMV BLD AUTO: 10.6 FL (ref 8.5–12.5)
POTASSIUM BLD-SCNC: 4 MMOL/L (ref 3.5–5)
PROT SERPL-MCNC: 7.7 G/DL (ref 6–8)
RBC # BLD AUTO: 3.81 MILL/UL (ref 3.8–5.4)
SODIUM SERPL-SCNC: 141 MMOL/L (ref 136–145)
T3 SERPL-MCNC: 102 NG/DL (ref 45–175)
T4 FREE SERPL-MCNC: 0.8 NG/DL (ref 0.7–1.8)
TSH SERPL DL<=0.005 MIU/L-ACNC: 1.53 UIU/ML (ref 0.3–5)
WBC: 3.8 THOU/UL (ref 4–11)

## 2020-10-05 ASSESSMENT — MIFFLIN-ST. JEOR: SCORE: 1321.32

## 2020-10-06 ENCOUNTER — COMMUNICATION - HEALTHEAST (OUTPATIENT)
Dept: INTERNAL MEDICINE | Facility: CLINIC | Age: 40
End: 2020-10-06

## 2020-10-07 ENCOUNTER — HOSPITAL ENCOUNTER (OUTPATIENT)
Dept: NUCLEAR MEDICINE | Facility: CLINIC | Age: 40
Discharge: HOME | End: 2020-10-07
Attending: INTERNAL MEDICINE

## 2020-10-07 DIAGNOSIS — E05.90 HYPERTHYROIDISM: ICD-10-CM

## 2020-10-08 ENCOUNTER — HOSPITAL ENCOUNTER (OUTPATIENT)
Dept: NUCLEAR MEDICINE | Facility: CLINIC | Age: 40
Discharge: HOME | End: 2020-10-08
Attending: INTERNAL MEDICINE

## 2020-10-13 ENCOUNTER — COMMUNICATION - HEALTHEAST (OUTPATIENT)
Dept: TELEHEALTH | Facility: CLINIC | Age: 40
End: 2020-10-13

## 2020-10-19 ENCOUNTER — AMBULATORY - HEALTHEAST (OUTPATIENT)
Dept: LAB | Facility: CLINIC | Age: 40
End: 2020-10-19

## 2020-10-19 DIAGNOSIS — Z11.59 ENCOUNTER FOR SCREENING FOR OTHER VIRAL DISEASES: ICD-10-CM

## 2020-10-21 ENCOUNTER — RECORDS - HEALTHEAST (OUTPATIENT)
Dept: ADMINISTRATIVE | Facility: OTHER | Age: 40
End: 2020-10-21

## 2020-10-21 ENCOUNTER — HOSPITAL ENCOUNTER (OUTPATIENT)
Dept: ULTRASOUND IMAGING | Facility: HOSPITAL | Age: 40
Discharge: HOME | End: 2020-10-21
Attending: INTERNAL MEDICINE | Admitting: RADIOLOGY

## 2020-10-21 ENCOUNTER — COMMUNICATION - HEALTHEAST (OUTPATIENT)
Dept: SCHEDULING | Facility: CLINIC | Age: 40
End: 2020-10-21

## 2020-10-21 DIAGNOSIS — E05.90 HYPERTHYROIDISM: ICD-10-CM

## 2020-11-07 ENCOUNTER — RECORDS - HEALTHEAST (OUTPATIENT)
Dept: LAB | Facility: CLINIC | Age: 40
End: 2020-11-07

## 2020-11-09 LAB
CAP COMMENT: ABNORMAL
LAB AP CHARGES (HE HISTORICAL CONVERSION): ABNORMAL
LAB AP INITIAL CYTO EVAL (HE HISTORICAL CONVERSION): ABNORMAL
LAB MED GENERAL PATH INTERP (HE HISTORICAL CONVERSION): ABNORMAL
PATH REPORT.ADDENDUM SPEC: ABNORMAL
PATH REPORT.COMMENTS IMP SPEC: ABNORMAL
PATH REPORT.FINAL DX SPEC: ABNORMAL
PATH REPORT.MICROSCOPIC SPEC OTHER STN: ABNORMAL
PATH REPORT.RELEVANT HX SPEC: ABNORMAL
SPECIMEN DESCRIPTION: ABNORMAL

## 2020-11-13 ENCOUNTER — COMMUNICATION - HEALTHEAST (OUTPATIENT)
Dept: INTERNAL MEDICINE | Facility: CLINIC | Age: 40
End: 2020-11-13

## 2020-11-13 ENCOUNTER — RECORDS - HEALTHEAST (OUTPATIENT)
Dept: LAB | Facility: CLINIC | Age: 40
End: 2020-11-13

## 2020-11-16 ENCOUNTER — COMMUNICATION - HEALTHEAST (OUTPATIENT)
Dept: INTERNAL MEDICINE | Facility: CLINIC | Age: 40
End: 2020-11-16

## 2020-11-16 ENCOUNTER — OFFICE VISIT - HEALTHEAST (OUTPATIENT)
Dept: INTERNAL MEDICINE | Facility: CLINIC | Age: 40
End: 2020-11-16

## 2020-11-16 DIAGNOSIS — E04.1 THYROID NODULE: ICD-10-CM

## 2020-11-16 DIAGNOSIS — D50.0 IRON DEFICIENCY ANEMIA DUE TO CHRONIC BLOOD LOSS: ICD-10-CM

## 2020-11-16 DIAGNOSIS — R05.9 COUGH: ICD-10-CM

## 2020-11-16 DIAGNOSIS — E06.9 THYROIDITIS: ICD-10-CM

## 2020-11-16 DIAGNOSIS — R05.8 DRY COUGH: ICD-10-CM

## 2020-11-16 ASSESSMENT — MIFFLIN-ST. JEOR: SCORE: 1321.32

## 2020-12-08 ENCOUNTER — TRANSFERRED RECORDS (OUTPATIENT)
Dept: SURGERY | Facility: CLINIC | Age: 40
End: 2020-12-08

## 2020-12-09 ENCOUNTER — TELEPHONE (OUTPATIENT)
Dept: SURGERY | Facility: CLINIC | Age: 40
End: 2020-12-09

## 2020-12-09 NOTE — TELEPHONE ENCOUNTER
Referral received from Dr Borrero for Thyroiditis, nodule,   Attempt #1:    Called patient at [phone] (home)  .  No answer-Voice mailbox full

## 2020-12-16 ENCOUNTER — OFFICE VISIT - HEALTHEAST (OUTPATIENT)
Dept: INTERNAL MEDICINE | Facility: CLINIC | Age: 40
End: 2020-12-16

## 2020-12-16 DIAGNOSIS — K90.9 IRON MALABSORPTION: ICD-10-CM

## 2020-12-16 DIAGNOSIS — Z00.00 HEALTH MAINTENANCE EXAMINATION: ICD-10-CM

## 2020-12-16 DIAGNOSIS — E04.1 THYROID NODULE: ICD-10-CM

## 2020-12-18 ENCOUNTER — TRANSFERRED RECORDS (OUTPATIENT)
Dept: SURGERY | Facility: CLINIC | Age: 40
End: 2020-12-18

## 2020-12-23 ENCOUNTER — VIRTUAL VISIT (OUTPATIENT)
Dept: SURGERY | Facility: CLINIC | Age: 40
End: 2020-12-23
Payer: COMMERCIAL

## 2020-12-23 VITALS — WEIGHT: 132 LBS | BODY MASS INDEX: 20 KG/M2 | HEIGHT: 68 IN

## 2020-12-23 DIAGNOSIS — E05.00 GRAVES DISEASE: Primary | ICD-10-CM

## 2020-12-23 PROCEDURE — 99203 OFFICE O/P NEW LOW 30 MIN: CPT | Mod: 95 | Performed by: SURGERY

## 2020-12-23 ASSESSMENT — MIFFLIN-ST. JEOR: SCORE: 1312.25

## 2020-12-23 NOTE — PROGRESS NOTES
"Phone Consult:  History of Present Illness  Bonnie Webster is a 40 year old female who is referred from Dr. Marcela Borrero for surgery consultation regarding  right thyroid nodule suspisous for malignancy  and Graves Disease and Hashimoto's thyroiditis.  Bonnie just started to see Dr Borrero recently for abnormal thyroid.    Eye symptoms:   Has bulging since childhood, also now with dry eye and some double vision.    She has symptoms of painful neck, throat. Weight fluctuation, fatigue.    She reports  neck discomfort.  She denies swallowing difficulty and hoarseness.    She does not have a family history of thyroid cancer.  She denies a personal history of radiation exposure.    Bonnie is not on thyroid medications.  Thyroid medications include: None.  Bonnie has no prior neck surgery..      No past medical history on file.  No past surgical history on file.    Smoking History:  currently smokes.  Advised about smoking cessation.  benefits of quitting    Physical Exam:  Ht 1.727 m (5' 8\")   Wt 59.9 kg (132 lb)   BMI 20.07 kg/m    Neurologic:  Speech is clear.    Psychologic:  Alert and appropriate range of emotions.    Imaging:  All imaging personally reviewed with Bonnie   Ultrasound: heterogenous thyroid.  Largest nodule right 1.3 cm, has been biopsied with atypia, Left is 2.2 cm.  Thyroid scan: 24.6% uptake    Labs:  TSI elevated 415, TPO > 900.    TSH 2.77, Free T4 0.79    FNA Biopsy: follicular right nodule with suspicious gene sequencing.    Assessment and Plan:    Bonnie has right thyroid suspicious nodule , Graves Disease and Hashimoto's thyroiditis.   I am recommending her for  total thyroidectomy.  Bonnie is aware of the risks to the recurrent laryngeal nerve and to the parathyroid glands during surgery.  She is interested in proceeding with surgery sometime in the next several weeks.    Stefani Perez MD  Please route to : Primary Care Provider (PCP) and Referring Provider  or send dictated " letter to:  Referring Provider    45 minutes spent with the patient and pre-charting, over 50% as counseling.

## 2020-12-23 NOTE — PROGRESS NOTES
".Bonnie Webster is a 40 year old female who is being evaluated via a billable telephone visit.      The patient has been notified of following:     \"This telephone visit will be conducted via a call between you and your physician/provider. We have found that certain health care needs can be provided without the need for a physical exam.  This service lets us provide the care you need with a short phone conversation.  If a prescription is necessary we can send it directly to your pharmacy.  If lab work is needed we can place an order for that and you can then stop by our lab to have the test done at a later time.    Telephone visits are billed at different rates depending on your insurance coverage. During this emergency period, for some insurers they may be billed the same as an in-person visit.  Please reach out to your insurance provider with any questions.    If during the course of the call the physician/provider feels a telephone visit is not appropriate, you will not be charged for this service.\"    Patient has given verbal consent for Telephone visit?  Yes    What phone number would you like to be contacted at? [phone]        Phone call duration: 35 minutes    Stefani Perez    "

## 2020-12-23 NOTE — LETTER
"2020    RE: Bonnie Webster, : 1980      Dear Marcela:      Thank you for asking me to see Bonnie Webster regarding her complex thyroid issues.  This pleasant 40-year-old female was sent to see you recently due to abnormal thyroid imaging and symptoms.  She has reported eye symptoms with bulging since childhood and now has additional symptoms of dry eye and some double vision.  She also has a painful anterior neck/throat area and reports weight fluctuation and fatigue.  She was hospitalized in early summer with a peculiar GI complex of symptoms and had reportedly fairly significant weight loss.  There is no family history of thyroid cancer, no personal history of radiation exposure, and currently she is on no thyroid medications.  She also reports no prior neck surgery.  She was evaluated via a virtual visit and reports a normal range of motion of her neck.  Her body habitus is a height of 5' 8\" and a weight of 132 with a normal BMI of 20.7.  Her imaging has been reviewed.  She has a very large and heterogeneous thyroid gland.  There are a number of nodules.  The one on the right superior is 1.3 cm, and this has been biopsied and shows atypia.  The largest on the left is 2.2 cm.  Her thyroid scan is 24.6%.  Her TSI is significantly elevated at 415, TPO is greater than 900, yet her TSH is normal at 2.77, and free T4 is 0.79.  The FNA biopsy shows the right nodule to be follicular with suspicious gene sequencing and an up to 50% risk for cancer.      Bonnie has a right thyroid suspicious nodule, Graves' disease and Hashimoto thyroiditis.  She is also symptomatic with her thyroid enlargement and has a degree of uncertainty regarding the pathology of her various nodules, at least one of which is suspicious.  For all these reasons, I am recommending her for total thyroidectomy.  She is aware of the risks to the recurrent laryngeal nerve and parathyroid glands during surgery.  She is interested in " proceeding in the next several weeks.  I have placed an order for Lugol's solution for preoperative management and to decrease the vascularity of her gland.  I am also asking that she has her thyroid functions, specifically TSH and free T4, repeated sometime in the week or two leading up to surgery, so that we can be sure that she is not hyperthyroid.      Thank you for the referral.         Stefani Perez MD

## 2020-12-25 NOTE — PROGRESS NOTES
"Service Date: 2020            Marcela Borrero MD   [facility] [address]      Patient:  Bonnie Webster   MRN:  [id number]   :  1980      Dear Marcela:      Thank you for asking me to see Bonnie Webster regarding her complex thyroid issues.  This pleasant 40-year-old female was sent to see you recently due to abnormal thyroid imaging and symptoms.  She has reported eye symptoms with bulging since childhood and now has additional symptoms of dry eye and some double vision.  She also has a painful anterior neck/throat area and reports weight fluctuation and fatigue.  She was hospitalized in early summer with a peculiar GI complex of symptoms and had reportedly fairly significant weight loss.  There is no family history of thyroid cancer, no personal history of radiation exposure, and currently she is on no thyroid medications.  She also reports no prior neck surgery.  She was evaluated via a virtual visit and reports a normal range of motion of her neck.  Her body habitus is a height of 5' 8\" and a weight of 132 with a normal BMI of 20.7.  Her imaging has been reviewed.  She has a very large and heterogeneous thyroid gland.  There are a number of nodules.  The one on the right superior is 1.3 cm, and this has been biopsied and shows atypia.  The largest on the left is 2.2 cm.  Her thyroid scan is 24.6%.  Her TSI is significantly elevated at 415, TPO is greater than 900, yet her TSH is normal at 2.77, and free T4 is 0.79.  The FNA biopsy shows the right nodule to be follicular with suspicious gene sequencing and an up to 50% risk for cancer.      Bonnie has a right thyroid suspicious nodule, Graves' disease and Hashimoto thyroiditis.  She is also symptomatic with her thyroid enlargement and has a degree of uncertainty regarding the pathology of her various nodules, at least one of which is suspicious.  For all these " reasons, I am recommending her for total thyroidectomy.  She is aware of the risks to the recurrent laryngeal nerve and parathyroid glands during surgery.  She is interested in proceeding in the next several weeks.  I have placed an order for Lugol's solution for preoperative management and to decrease the vascularity of her gland.  I am also asking that she has her thyroid functions, specifically TSH and free T4, repeated sometime in the week or two leading up to surgery, so that we can be sure that she is not hyperthyroid.      Thank you for the referral.         Stefani Gaines MD      cc:  Rolanda Xiong MD   [place] [address]            STEFANI GAINES MD             D: 2020   T: 2020   MT: GREGG      Name:     IZABELLA BOWLES   MRN:      [id number]        Account:      [id number]   :      1980           Service Date: 2020      Document: [id number]

## 2020-12-27 ENCOUNTER — RECORDS - HEALTHEAST (OUTPATIENT)
Dept: ADMINISTRATIVE | Facility: OTHER | Age: 40
End: 2020-12-27

## 2020-12-29 ENCOUNTER — PREP FOR PROCEDURE (OUTPATIENT)
Dept: SURGERY | Facility: CLINIC | Age: 40
End: 2020-12-29

## 2020-12-29 ENCOUNTER — TELEPHONE (OUTPATIENT)
Dept: SURGERY | Facility: CLINIC | Age: 40
End: 2020-12-29

## 2020-12-29 DIAGNOSIS — E06.3 HASHIMOTO'S DISEASE: ICD-10-CM

## 2020-12-29 DIAGNOSIS — E05.00 GRAVES DISEASE: Primary | ICD-10-CM

## 2020-12-29 RX ORDER — IODINE SOLUTION STRONG 5% (LUGOL'S) 5 %
SOLUTION ORAL
Qty: 15 ML | Refills: 0 | Status: ON HOLD | OUTPATIENT
Start: 2020-12-29 | End: 2021-01-19

## 2020-12-29 NOTE — TELEPHONE ENCOUNTER
Type of surgery: TOTAL THYROIDECTOMY   Location of surgery: [facility]  Date and time of surgery: 1-18-21, 11:40 AM   Surgeon: DR. GAINES   Pre-Op Appt Date: PATIENT TO SCHEDULE   Post-Op Appt Date: PATIENT TO SCHEDULE    Packet sent out: Yes  Pre-cert/Authorization completed:  Not Applicable  Date: 12-29-20       *outpatient overnight*   TOTAL THYROIDECTOMY   GENERAL   PT INST TO HAVE H&P WITH DR. LANDERS  2.5 HRS REQ   PA ASSIST NLG   ALW

## 2020-12-31 DIAGNOSIS — Z11.59 ENCOUNTER FOR SCREENING FOR OTHER VIRAL DISEASES: Primary | ICD-10-CM

## 2021-01-07 ENCOUNTER — OFFICE VISIT - HEALTHEAST (OUTPATIENT)
Dept: INTERNAL MEDICINE | Facility: CLINIC | Age: 41
End: 2021-01-07

## 2021-01-07 DIAGNOSIS — Z71.6 ENCOUNTER FOR TOBACCO USE CESSATION COUNSELING: ICD-10-CM

## 2021-01-07 DIAGNOSIS — Z01.818 PREOP GENERAL PHYSICAL EXAM: ICD-10-CM

## 2021-01-07 LAB
ANION GAP SERPL CALCULATED.3IONS-SCNC: 7 MMOL/L (ref 5–18)
BASOPHILS # BLD AUTO: 0 THOU/UL (ref 0–0.2)
BASOPHILS NFR BLD AUTO: 1 % (ref 0–2)
BUN SERPL-MCNC: 13 MG/DL (ref 8–22)
CALCIUM SERPL-MCNC: 9 MG/DL (ref 8.5–10.5)
CHLORIDE BLD-SCNC: 107 MMOL/L (ref 98–107)
CO2 SERPL-SCNC: 25 MMOL/L (ref 22–31)
CREAT SERPL-MCNC: 0.74 MG/DL (ref 0.6–1.1)
EOSINOPHIL # BLD AUTO: 0.1 THOU/UL (ref 0–0.4)
EOSINOPHIL NFR BLD AUTO: 2 % (ref 0–6)
ERYTHROCYTE [DISTWIDTH] IN BLOOD BY AUTOMATED COUNT: 13.8 % (ref 11–14.5)
GFR SERPL CREATININE-BSD FRML MDRD: >60 ML/MIN/1.73M2
GLUCOSE BLD-MCNC: 95 MG/DL (ref 70–125)
HCT VFR BLD AUTO: 33.9 % (ref 35–47)
HGB BLD-MCNC: 11.6 G/DL (ref 12–16)
IRON SATN MFR SERPL: 11 % (ref 20–50)
IRON SERPL-MCNC: 42 UG/DL (ref 42–175)
LYMPHOCYTES # BLD AUTO: 1.3 THOU/UL (ref 0.8–4.4)
LYMPHOCYTES NFR BLD AUTO: 34 % (ref 20–40)
MCH RBC QN AUTO: 31.2 PG (ref 27–34)
MCHC RBC AUTO-ENTMCNC: 34.1 G/DL (ref 32–36)
MCV RBC AUTO: 91 FL (ref 80–100)
MONOCYTES # BLD AUTO: 0.3 THOU/UL (ref 0–0.9)
MONOCYTES NFR BLD AUTO: 8 % (ref 2–10)
NEUTROPHILS # BLD AUTO: 2.2 THOU/UL (ref 2–7.7)
NEUTROPHILS NFR BLD AUTO: 56 % (ref 50–70)
PLATELET # BLD AUTO: 201 THOU/UL (ref 140–440)
PMV BLD AUTO: 8.5 FL (ref 7–10)
POTASSIUM BLD-SCNC: 4.9 MMOL/L (ref 3.5–5)
RBC # BLD AUTO: 3.71 MILL/UL (ref 3.8–5.4)
SODIUM SERPL-SCNC: 139 MMOL/L (ref 136–145)
TIBC SERPL-MCNC: 374 UG/DL (ref 313–563)
TRANSFERRIN SERPL-MCNC: 299 MG/DL (ref 212–360)
TSH SERPL DL<=0.005 MIU/L-ACNC: 2.19 UIU/ML (ref 0.3–5)
WBC: 3.9 THOU/UL (ref 4–11)

## 2021-01-07 ASSESSMENT — MIFFLIN-ST. JEOR: SCORE: 1339.46

## 2021-01-07 ASSESSMENT — PATIENT HEALTH QUESTIONNAIRE - PHQ9: SUM OF ALL RESPONSES TO PHQ QUESTIONS 1-9: 11

## 2021-01-12 ENCOUNTER — COMMUNICATION - HEALTHEAST (OUTPATIENT)
Dept: INTERNAL MEDICINE | Facility: CLINIC | Age: 41
End: 2021-01-12

## 2021-01-14 ENCOUNTER — AMBULATORY - HEALTHEAST (OUTPATIENT)
Dept: LAB | Facility: CLINIC | Age: 41
End: 2021-01-14

## 2021-01-14 DIAGNOSIS — Z11.59 ENCOUNTER FOR SCREENING FOR OTHER VIRAL DISEASES: ICD-10-CM

## 2021-01-15 PROBLEM — E87.6 HYPOKALEMIA: Status: ACTIVE | Noted: 2020-06-01

## 2021-01-15 PROBLEM — F43.23 ADJUSTMENT DISORDER WITH MIXED ANXIETY AND DEPRESSED MOOD: Status: ACTIVE | Noted: 2019-09-01

## 2021-01-15 PROBLEM — F12.10 CANNABIS ABUSE: Status: ACTIVE | Noted: 2020-06-01

## 2021-01-15 PROBLEM — D64.9 SEVERE ANEMIA: Status: ACTIVE | Noted: 2018-09-01

## 2021-01-16 ENCOUNTER — COMMUNICATION - HEALTHEAST (OUTPATIENT)
Dept: SCHEDULING | Facility: CLINIC | Age: 41
End: 2021-01-16

## 2021-01-18 ENCOUNTER — ANESTHESIA EVENT (OUTPATIENT)
Dept: SURGERY | Facility: CLINIC | Age: 41
End: 2021-01-18
Payer: COMMERCIAL

## 2021-01-18 ENCOUNTER — OFFICE VISIT (OUTPATIENT)
Dept: SURGERY | Facility: PHYSICIAN GROUP | Age: 41
End: 2021-01-18
Payer: COMMERCIAL

## 2021-01-18 ENCOUNTER — HOSPITAL ENCOUNTER (OUTPATIENT)
Facility: CLINIC | Age: 41
Discharge: HOME | End: 2021-01-19
Attending: SURGERY | Admitting: SURGERY
Payer: COMMERCIAL

## 2021-01-18 ENCOUNTER — SURGERY (OUTPATIENT)
Age: 41
End: 2021-01-18
Payer: COMMERCIAL

## 2021-01-18 ENCOUNTER — ANESTHESIA (OUTPATIENT)
Dept: SURGERY | Facility: CLINIC | Age: 41
End: 2021-01-18
Payer: COMMERCIAL

## 2021-01-18 DIAGNOSIS — E06.3 HASHIMOTO'S DISEASE: ICD-10-CM

## 2021-01-18 DIAGNOSIS — E89.0 S/P TOTAL THYROIDECTOMY: Primary | ICD-10-CM

## 2021-01-18 DIAGNOSIS — E05.00 GRAVES DISEASE: ICD-10-CM

## 2021-01-18 DIAGNOSIS — Z53.9 ERRONEOUS ENCOUNTER--DISREGARD: Primary | ICD-10-CM

## 2021-01-18 LAB
CALCIUM SERPL-MCNC: 8.8 MG/DL (ref 8.5–10.1)
HCG UR QL: NEGATIVE

## 2021-01-18 PROCEDURE — 250N000013 HC RX MED GY IP 250 OP 250 PS 637: Performed by: SURGERY

## 2021-01-18 PROCEDURE — 81025 URINE PREGNANCY TEST: CPT | Performed by: ANESTHESIOLOGY

## 2021-01-18 PROCEDURE — 250N000011 HC RX IP 250 OP 636: Performed by: NURSE ANESTHETIST, CERTIFIED REGISTERED

## 2021-01-18 PROCEDURE — 258N000003 HC RX IP 258 OP 636: Performed by: ANESTHESIOLOGY

## 2021-01-18 PROCEDURE — 370N000017 HC ANESTHESIA TECHNICAL FEE, PER MIN: Performed by: SURGERY

## 2021-01-18 PROCEDURE — 999N000141 HC STATISTIC PRE-PROCEDURE NURSING ASSESSMENT: Performed by: SURGERY

## 2021-01-18 PROCEDURE — 60240 REMOVAL OF THYROID: CPT | Mod: AS | Performed by: PHYSICIAN ASSISTANT

## 2021-01-18 PROCEDURE — 250N000009 HC RX 250: Performed by: SURGERY

## 2021-01-18 PROCEDURE — 710N000009 HC RECOVERY PHASE 1, LEVEL 1, PER MIN: Performed by: SURGERY

## 2021-01-18 PROCEDURE — 36415 COLL VENOUS BLD VENIPUNCTURE: CPT | Performed by: SURGERY

## 2021-01-18 PROCEDURE — 88307 TISSUE EXAM BY PATHOLOGIST: CPT | Mod: TC | Performed by: SURGERY

## 2021-01-18 PROCEDURE — 250N000011 HC RX IP 250 OP 636: Performed by: ANESTHESIOLOGY

## 2021-01-18 PROCEDURE — 250N000009 HC RX 250: Performed by: NURSE ANESTHETIST, CERTIFIED REGISTERED

## 2021-01-18 PROCEDURE — 360N000077 HC SURGERY LEVEL 4, PER MIN: Performed by: SURGERY

## 2021-01-18 PROCEDURE — 258N000003 HC RX IP 258 OP 636: Performed by: NURSE ANESTHETIST, CERTIFIED REGISTERED

## 2021-01-18 PROCEDURE — 258N000003 HC RX IP 258 OP 636: Performed by: SURGERY

## 2021-01-18 PROCEDURE — 250N000011 HC RX IP 250 OP 636: Performed by: SURGERY

## 2021-01-18 PROCEDURE — 60240 REMOVAL OF THYROID: CPT | Performed by: SURGERY

## 2021-01-18 PROCEDURE — 272N000001 HC OR GENERAL SUPPLY STERILE: Performed by: SURGERY

## 2021-01-18 PROCEDURE — 88307 TISSUE EXAM BY PATHOLOGIST: CPT | Mod: 26

## 2021-01-18 PROCEDURE — 82310 ASSAY OF CALCIUM: CPT | Performed by: SURGERY

## 2021-01-18 RX ORDER — LEVOTHYROXINE SODIUM 100 UG/1
100 TABLET ORAL DAILY
Status: DISCONTINUED | OUTPATIENT
Start: 2021-01-19 | End: 2021-01-19 | Stop reason: HOSPADM

## 2021-01-18 RX ORDER — LIDOCAINE 40 MG/G
CREAM TOPICAL
Status: DISCONTINUED | OUTPATIENT
Start: 2021-01-18 | End: 2021-01-18 | Stop reason: HOSPADM

## 2021-01-18 RX ORDER — ACETAMINOPHEN 325 MG/1
650 TABLET ORAL EVERY 6 HOURS PRN
Status: DISCONTINUED | OUTPATIENT
Start: 2021-01-18 | End: 2021-01-19 | Stop reason: HOSPADM

## 2021-01-18 RX ORDER — PROPOFOL 10 MG/ML
INJECTION, EMULSION INTRAVENOUS CONTINUOUS PRN
Status: DISCONTINUED | OUTPATIENT
Start: 2021-01-18 | End: 2021-01-18

## 2021-01-18 RX ORDER — CEFAZOLIN SODIUM 1 G/3ML
1 INJECTION, POWDER, FOR SOLUTION INTRAMUSCULAR; INTRAVENOUS SEE ADMIN INSTRUCTIONS
Status: DISCONTINUED | OUTPATIENT
Start: 2021-01-18 | End: 2021-01-18 | Stop reason: HOSPADM

## 2021-01-18 RX ORDER — BUPIVACAINE HYDROCHLORIDE 2.5 MG/ML
INJECTION, SOLUTION EPIDURAL; INFILTRATION; INTRACAUDAL PRN
Status: DISCONTINUED | OUTPATIENT
Start: 2021-01-18 | End: 2021-01-18 | Stop reason: HOSPADM

## 2021-01-18 RX ORDER — DEXAMETHASONE SODIUM PHOSPHATE 4 MG/ML
INJECTION, SOLUTION INTRA-ARTICULAR; INTRALESIONAL; INTRAMUSCULAR; INTRAVENOUS; SOFT TISSUE PRN
Status: DISCONTINUED | OUTPATIENT
Start: 2021-01-18 | End: 2021-01-18

## 2021-01-18 RX ORDER — HYDROMORPHONE HYDROCHLORIDE 1 MG/ML
.3-.5 INJECTION, SOLUTION INTRAMUSCULAR; INTRAVENOUS; SUBCUTANEOUS EVERY 5 MIN PRN
Status: DISCONTINUED | OUTPATIENT
Start: 2021-01-18 | End: 2021-01-18 | Stop reason: HOSPADM

## 2021-01-18 RX ORDER — DIMENHYDRINATE 50 MG/ML
25 INJECTION, SOLUTION INTRAMUSCULAR; INTRAVENOUS
Status: DISCONTINUED | OUTPATIENT
Start: 2021-01-18 | End: 2021-01-18 | Stop reason: HOSPADM

## 2021-01-18 RX ORDER — NALOXONE HYDROCHLORIDE 0.4 MG/ML
0.4 INJECTION, SOLUTION INTRAMUSCULAR; INTRAVENOUS; SUBCUTANEOUS
Status: DISCONTINUED | OUTPATIENT
Start: 2021-01-18 | End: 2021-01-18

## 2021-01-18 RX ORDER — NALOXONE HYDROCHLORIDE 0.4 MG/ML
0.2 INJECTION, SOLUTION INTRAMUSCULAR; INTRAVENOUS; SUBCUTANEOUS
Status: DISCONTINUED | OUTPATIENT
Start: 2021-01-18 | End: 2021-01-18

## 2021-01-18 RX ORDER — FENTANYL CITRATE 50 UG/ML
INJECTION, SOLUTION INTRAMUSCULAR; INTRAVENOUS PRN
Status: DISCONTINUED | OUTPATIENT
Start: 2021-01-18 | End: 2021-01-18

## 2021-01-18 RX ORDER — PROPOFOL 10 MG/ML
INJECTION, EMULSION INTRAVENOUS PRN
Status: DISCONTINUED | OUTPATIENT
Start: 2021-01-18 | End: 2021-01-18

## 2021-01-18 RX ORDER — ONDANSETRON 2 MG/ML
4 INJECTION INTRAMUSCULAR; INTRAVENOUS EVERY 30 MIN PRN
Status: DISCONTINUED | OUTPATIENT
Start: 2021-01-18 | End: 2021-01-18 | Stop reason: HOSPADM

## 2021-01-18 RX ORDER — ONDANSETRON 4 MG/1
4 TABLET, ORALLY DISINTEGRATING ORAL EVERY 30 MIN PRN
Status: DISCONTINUED | OUTPATIENT
Start: 2021-01-18 | End: 2021-01-18 | Stop reason: HOSPADM

## 2021-01-18 RX ORDER — HYDRALAZINE HYDROCHLORIDE 20 MG/ML
2.5-5 INJECTION INTRAMUSCULAR; INTRAVENOUS EVERY 10 MIN PRN
Status: DISCONTINUED | OUTPATIENT
Start: 2021-01-18 | End: 2021-01-18 | Stop reason: HOSPADM

## 2021-01-18 RX ORDER — LIDOCAINE HYDROCHLORIDE 10 MG/ML
INJECTION, SOLUTION INFILTRATION; PERINEURAL PRN
Status: DISCONTINUED | OUTPATIENT
Start: 2021-01-18 | End: 2021-01-18

## 2021-01-18 RX ORDER — PROCHLORPERAZINE MALEATE 5 MG
10 TABLET ORAL EVERY 6 HOURS PRN
Status: DISCONTINUED | OUTPATIENT
Start: 2021-01-18 | End: 2021-01-19 | Stop reason: HOSPADM

## 2021-01-18 RX ORDER — KETAMINE HYDROCHLORIDE 10 MG/ML
INJECTION INTRAMUSCULAR; INTRAVENOUS PRN
Status: DISCONTINUED | OUTPATIENT
Start: 2021-01-18 | End: 2021-01-18

## 2021-01-18 RX ORDER — SODIUM CHLORIDE, SODIUM LACTATE, POTASSIUM CHLORIDE, CALCIUM CHLORIDE 600; 310; 30; 20 MG/100ML; MG/100ML; MG/100ML; MG/100ML
INJECTION, SOLUTION INTRAVENOUS CONTINUOUS
Status: DISCONTINUED | OUTPATIENT
Start: 2021-01-18 | End: 2021-01-18 | Stop reason: HOSPADM

## 2021-01-18 RX ORDER — METOCLOPRAMIDE HYDROCHLORIDE 5 MG/ML
10 INJECTION INTRAMUSCULAR; INTRAVENOUS EVERY 6 HOURS PRN
Status: DISCONTINUED | OUTPATIENT
Start: 2021-01-18 | End: 2021-01-18

## 2021-01-18 RX ORDER — LIDOCAINE 40 MG/G
CREAM TOPICAL
Status: DISCONTINUED | OUTPATIENT
Start: 2021-01-18 | End: 2021-01-19 | Stop reason: HOSPADM

## 2021-01-18 RX ORDER — METOPROLOL TARTRATE 1 MG/ML
1-2 INJECTION, SOLUTION INTRAVENOUS EVERY 5 MIN PRN
Status: DISCONTINUED | OUTPATIENT
Start: 2021-01-18 | End: 2021-01-18 | Stop reason: HOSPADM

## 2021-01-18 RX ORDER — NALOXONE HYDROCHLORIDE 0.4 MG/ML
0.2 INJECTION, SOLUTION INTRAMUSCULAR; INTRAVENOUS; SUBCUTANEOUS
Status: DISCONTINUED | OUTPATIENT
Start: 2021-01-18 | End: 2021-01-19 | Stop reason: HOSPADM

## 2021-01-18 RX ORDER — NALOXONE HYDROCHLORIDE 0.4 MG/ML
0.4 INJECTION, SOLUTION INTRAMUSCULAR; INTRAVENOUS; SUBCUTANEOUS
Status: DISCONTINUED | OUTPATIENT
Start: 2021-01-18 | End: 2021-01-19 | Stop reason: HOSPADM

## 2021-01-18 RX ORDER — SODIUM CHLORIDE, SODIUM LACTATE, POTASSIUM CHLORIDE, CALCIUM CHLORIDE 600; 310; 30; 20 MG/100ML; MG/100ML; MG/100ML; MG/100ML
INJECTION, SOLUTION INTRAVENOUS CONTINUOUS
Status: DISCONTINUED | OUTPATIENT
Start: 2021-01-18 | End: 2021-01-19 | Stop reason: HOSPADM

## 2021-01-18 RX ORDER — KETOROLAC TROMETHAMINE 30 MG/ML
30 INJECTION, SOLUTION INTRAMUSCULAR; INTRAVENOUS
Status: DISCONTINUED | OUTPATIENT
Start: 2021-01-18 | End: 2021-01-18 | Stop reason: HOSPADM

## 2021-01-18 RX ORDER — ONDANSETRON 4 MG/1
4 TABLET, ORALLY DISINTEGRATING ORAL EVERY 6 HOURS PRN
Status: DISCONTINUED | OUTPATIENT
Start: 2021-01-18 | End: 2021-01-19 | Stop reason: HOSPADM

## 2021-01-18 RX ORDER — FENTANYL CITRATE 50 UG/ML
25-50 INJECTION, SOLUTION INTRAMUSCULAR; INTRAVENOUS
Status: DISCONTINUED | OUTPATIENT
Start: 2021-01-18 | End: 2021-01-18 | Stop reason: HOSPADM

## 2021-01-18 RX ORDER — CEFAZOLIN SODIUM 2 G/100ML
2 INJECTION, SOLUTION INTRAVENOUS
Status: COMPLETED | OUTPATIENT
Start: 2021-01-18 | End: 2021-01-18

## 2021-01-18 RX ORDER — CALCIUM CARBONATE 1250 MG/5ML
2500 SUSPENSION ORAL EVERY 8 HOURS
Status: DISCONTINUED | OUTPATIENT
Start: 2021-01-18 | End: 2021-01-19 | Stop reason: HOSPADM

## 2021-01-18 RX ORDER — ONDANSETRON 2 MG/ML
INJECTION INTRAMUSCULAR; INTRAVENOUS PRN
Status: DISCONTINUED | OUTPATIENT
Start: 2021-01-18 | End: 2021-01-18

## 2021-01-18 RX ORDER — MAGNESIUM HYDROXIDE 1200 MG/15ML
LIQUID ORAL PRN
Status: DISCONTINUED | OUTPATIENT
Start: 2021-01-18 | End: 2021-01-18 | Stop reason: HOSPADM

## 2021-01-18 RX ORDER — HYDROCODONE BITARTRATE AND ACETAMINOPHEN 5; 325 MG/1; MG/1
1-2 TABLET ORAL EVERY 4 HOURS PRN
Status: DISCONTINUED | OUTPATIENT
Start: 2021-01-18 | End: 2021-01-19 | Stop reason: HOSPADM

## 2021-01-18 RX ORDER — FENTANYL CITRATE-0.9 % NACL/PF 10 MCG/ML
PLASTIC BAG, INJECTION (ML) INTRAVENOUS CONTINUOUS PRN
Status: DISCONTINUED | OUTPATIENT
Start: 2021-01-18 | End: 2021-01-18

## 2021-01-18 RX ORDER — METOCLOPRAMIDE 10 MG/1
10 TABLET ORAL EVERY 6 HOURS PRN
Status: DISCONTINUED | OUTPATIENT
Start: 2021-01-18 | End: 2021-01-18

## 2021-01-18 RX ORDER — ONDANSETRON 2 MG/ML
4 INJECTION INTRAMUSCULAR; INTRAVENOUS EVERY 6 HOURS PRN
Status: DISCONTINUED | OUTPATIENT
Start: 2021-01-18 | End: 2021-01-19 | Stop reason: HOSPADM

## 2021-01-18 RX ORDER — GLYCOPYRROLATE 0.2 MG/ML
INJECTION, SOLUTION INTRAMUSCULAR; INTRAVENOUS PRN
Status: DISCONTINUED | OUTPATIENT
Start: 2021-01-18 | End: 2021-01-18

## 2021-01-18 RX ADMIN — HYDROCODONE BITARTRATE AND ACETAMINOPHEN 1 TABLET: 5; 325 TABLET ORAL at 21:29

## 2021-01-18 RX ADMIN — Medication 20 MG: at 12:39

## 2021-01-18 RX ADMIN — PHENYLEPHRINE HYDROCHLORIDE 100 MCG: 10 INJECTION INTRAVENOUS at 12:53

## 2021-01-18 RX ADMIN — SODIUM CHLORIDE, POTASSIUM CHLORIDE, SODIUM LACTATE AND CALCIUM CHLORIDE: 600; 310; 30; 20 INJECTION, SOLUTION INTRAVENOUS at 14:40

## 2021-01-18 RX ADMIN — BUPIVACAINE HYDROCHLORIDE 10 ML: 2.5 INJECTION, SOLUTION EPIDURAL; INFILTRATION; INTRACAUDAL; PERINEURAL at 13:39

## 2021-01-18 RX ADMIN — FENTANYL CITRATE 50 MCG: 50 INJECTION, SOLUTION INTRAMUSCULAR; INTRAVENOUS at 12:33

## 2021-01-18 RX ADMIN — FENTANYL CITRATE 50 MCG: 50 INJECTION, SOLUTION INTRAMUSCULAR; INTRAVENOUS at 13:15

## 2021-01-18 RX ADMIN — FENTANYL CITRATE 50 MCG: 50 INJECTION, SOLUTION INTRAMUSCULAR; INTRAVENOUS at 12:30

## 2021-01-18 RX ADMIN — PROPOFOL 160 MG: 10 INJECTION, EMULSION INTRAVENOUS at 12:13

## 2021-01-18 RX ADMIN — HYDROMORPHONE HYDROCHLORIDE 0.5 MG: 1 INJECTION, SOLUTION INTRAMUSCULAR; INTRAVENOUS; SUBCUTANEOUS at 14:51

## 2021-01-18 RX ADMIN — FENTANYL CITRATE 100 MCG: 50 INJECTION, SOLUTION INTRAMUSCULAR; INTRAVENOUS at 12:13

## 2021-01-18 RX ADMIN — MIDAZOLAM 2 MG: 1 INJECTION INTRAMUSCULAR; INTRAVENOUS at 12:03

## 2021-01-18 RX ADMIN — LIDOCAINE HYDROCHLORIDE 50 MG: 10 INJECTION, SOLUTION INFILTRATION; PERINEURAL at 12:13

## 2021-01-18 RX ADMIN — SODIUM CHLORIDE, POTASSIUM CHLORIDE, SODIUM LACTATE AND CALCIUM CHLORIDE: 600; 310; 30; 20 INJECTION, SOLUTION INTRAVENOUS at 19:31

## 2021-01-18 RX ADMIN — ONDANSETRON HYDROCHLORIDE 4 MG: 2 INJECTION, SOLUTION INTRAVENOUS at 13:36

## 2021-01-18 RX ADMIN — Medication 30 MG: at 12:26

## 2021-01-18 RX ADMIN — HYDRALAZINE HYDROCHLORIDE 5 MG: 20 INJECTION INTRAMUSCULAR; INTRAVENOUS at 15:10

## 2021-01-18 RX ADMIN — CALCIUM CARBONATE 2500 MG: 1250 SUSPENSION ORAL at 17:36

## 2021-01-18 RX ADMIN — HYDRALAZINE HYDROCHLORIDE 5 MG: 20 INJECTION INTRAMUSCULAR; INTRAVENOUS at 15:32

## 2021-01-18 RX ADMIN — HYDROCODONE BITARTRATE AND ACETAMINOPHEN 150 ML: 500; 5 TABLET ORAL at 13:40

## 2021-01-18 RX ADMIN — Medication 20 MCG/MIN: at 12:20

## 2021-01-18 RX ADMIN — CEFAZOLIN SODIUM 2 G: 2 INJECTION, SOLUTION INTRAVENOUS at 12:23

## 2021-01-18 RX ADMIN — GLYCOPYRROLATE 0.2 MG: 0.2 INJECTION, SOLUTION INTRAMUSCULAR; INTRAVENOUS at 12:13

## 2021-01-18 RX ADMIN — PROPOFOL 75 MCG/KG/MIN: 10 INJECTION, EMULSION INTRAVENOUS at 12:25

## 2021-01-18 RX ADMIN — HYDROCODONE BITARTRATE AND ACETAMINOPHEN 1 TABLET: 5; 325 TABLET ORAL at 16:52

## 2021-01-18 RX ADMIN — SODIUM CHLORIDE, POTASSIUM CHLORIDE, SODIUM LACTATE AND CALCIUM CHLORIDE: 600; 310; 30; 20 INJECTION, SOLUTION INTRAVENOUS at 12:09

## 2021-01-18 RX ADMIN — FENTANYL CITRATE 25 MCG: 50 INJECTION, SOLUTION INTRAMUSCULAR; INTRAVENOUS at 15:53

## 2021-01-18 RX ADMIN — Medication 100 MG: at 12:13

## 2021-01-18 RX ADMIN — ONDANSETRON 4 MG: 2 INJECTION INTRAMUSCULAR; INTRAVENOUS at 15:18

## 2021-01-18 RX ADMIN — FENTANYL CITRATE 50 MCG: 50 INJECTION, SOLUTION INTRAMUSCULAR; INTRAVENOUS at 14:32

## 2021-01-18 RX ADMIN — DEXAMETHASONE SODIUM PHOSPHATE 4 MG: 4 INJECTION, SOLUTION INTRA-ARTICULAR; INTRALESIONAL; INTRAMUSCULAR; INTRAVENOUS; SOFT TISSUE at 12:13

## 2021-01-18 ASSESSMENT — MIFFLIN-ST. JEOR: SCORE: 1339.92

## 2021-01-18 NOTE — PLAN OF CARE
PRIMARY DIAGNOSIS: Total Thyroidectomy  OUTPATIENT/OBSERVATION GOALS TO BE MET BEFORE DISCHARGE:  1. Stable vital signs Yes  2. Tolerating diet:No, small sips of water  3. Pain controlled with oral pain medications:  No, Fent and dilaudid in PACU  4. Positive bowel sounds:  Yes  5. Voiding without difficulty:  Yes, voided as soon as she arrived to floor  6. Able to ambulate:  Yes, SBA  7. Provider specific discharge goals met:  No    Pt reports intermittent nausea, worsened by movent. Pt reports pain 6/10, 1 NORCO tab given to pt. Pt trying jello. KHADAR drain to bulb suction, small amount of serosanguinous drainage. Pt. Lethargic, falling asleep easily.     Discharge Planner Nurse   Safe discharge environment identified: Yes  Barriers to discharge: No       Entered by: Yo Baird 01/18/2021 5:16 PM     Please review provider order for any additional goals.   Nurse to notify provider when observation goals have been met and patient is ready for discharge.

## 2021-01-18 NOTE — ANESTHESIA POSTPROCEDURE EVALUATION
Patient: Bonnie Webster    Procedure(s):  TOTAL THYROIDECTOMY    Diagnosis:Graves disease [E05.00]  Hashimoto's disease [E06.3]  Diagnosis Additional Information: No value filed.    Anesthesia Type:  General    Note:     Postop Pain Control: Uneventful            Sign Out: Well controlled pain   PONV: No   Neuro/Psych: Uneventful            Sign Out: Acceptable/Baseline neuro status   Airway/Respiratory: Uneventful            Sign Out: Acceptable/Baseline resp. status   CV/Hemodynamics: Uneventful            Sign Out: Acceptable CV status   Other NRE: NONE   DID A NON-ROUTINE EVENT OCCUR? No         Last vitals:  Vitals:    01/18/21 1623 01/18/21 1650 01/18/21 1735   BP: (!) 147/81 (!) 133/95 139/81   Pulse: 56 69 56   Resp: 16 16 14   Temp: 97.6  F (36.4  C)     SpO2: 99% 98% 99%       Electronically Signed By: Evan Vides MD  January 18, 2021  5:42 PM

## 2021-01-18 NOTE — OP NOTE
General Surgery Operative Note    PREOPERATIVE DIAGNOSIS:  Graves disease [E05.00]  Hashimoto's disease [E06.3]    POSTOPERATIVE DIAGNOSIS:  Graves disease    PROCEDURE:  Total thyroidectomy    ANESTHESIA:  General.    PREOPERATIVE MEDICATIONS:  Ancef 2 gm    SURGEON:  Stefani Perez MD    ASSISTANT:  Julia Beltran PA-C   - the physician assistant was medically necessary in providing adequate exposure in the operating field, maintaining hemostasis, cutting suture, clamping and ligating blood vessels, and visualization of the anatomic structures throughout the surgical procedure.    ESTIMATED BLOOD LOSS:  20cc's    INDICATIONS:  Bonnie Webster is a 40 year old female who has Graves Disease and a suspicious right follicular nodule.  She has also had symptoms including neck pain and eye complaints.  She presents for total thyroidectomy.    DESCRIPTION OF PROCEDURE:  The patient was placed supine, head and neck in extension and a bump between the scapulae.  Transverse cervical neck creases were marked in the preinduction area and the one most suitable was utilized for exposure.  Superior and inferior skin flaps raised.  Midline fascia opened and reflected to the left. The thyroid lobe was firm, mildly enlarged and nodular.  The upper pole was taken down by double ligation and division.  Middle thyroidal vein and inferior pole veins were ligated and the gland reflected medially.  The superior parathyroid and recurrent laryngeal nerve were identified and preserved.  The posterior dissection was meticulously done to ligate and divide the inferior thyroidal artery and the ligament of Berry.  The inferior parathyroid was also seen and preserved.  We then proceeded with the right thyroid lobectomy.  This lobe was larger, but otherwise similar to the left.  The upper pole was taken down by double ligation and division.  Middle thyroidal vein and inferior pole veins were ligated and the gland reflected medially.  The  superior parathyroid and recurrent laryngeal nerve were identified and preserved.  The posterior dissection was meticulously done to ligate and divide the inferior thyroidal artery and the ligament of Berry.  The inferior parathyroid was seen and preserved.  Once the remaining attachments were divided, the gland was oriented for pathology and submitted for permanent sections.  The site was inspected for hemostasis, irrigated and closed over a 10 round KHADAR drain using running 3-0 Vicryl for the midline fascia, interrupted for platysma and 4-0 subcuticular Monocryl for skin.  The patient transferred to recovery in good condition.    INTRAOPERATIVE FINDINGS:  1.  Enlarged and nodular thyroid consistent with Graves and nodules.  2.  Both recurrent laryngeal nerves seen and preserved, function confirmed by nerve monitor.  3.  All four parathyroid glands seen and preserved.      Specimens:   ID Type Source Tests Collected by Time Destination   A : total thyroid, suture marks right superior pole  Tissue Thyroid SURGICAL PATHOLOGY EXAM Stefani Perez MD 1/18/2021  1:34 PM        Stefani Perez MD

## 2021-01-18 NOTE — ANESTHESIA PREPROCEDURE EVALUATION
Anesthesia Pre-Procedure Evaluation    Patient: Bonnie Webster   MRN: [id number] : 1980        Preoperative Diagnosis: Graves disease [E05.00]  Hashimoto's disease [E06.3]   Procedure : Procedure(s):  TOTAL THYROIDECTOMY     Past Medical History:   Diagnosis Date     Adjustment disorder with mixed anxiety and depressed mood 2019     Cannabis abuse 2020     Chronic infection 2017    MRSA     Exophthalmos of both eyes 2020     Graves disease 2020     Hashimoto's disease 2020     History of blood transfusion      Hypokalemia 2020     Severe anemia 2018      Past Surgical History:   Procedure Laterality Date     BONE MARROW BIOPSY       C/SECTION, CLASSICAL      x2     ORTHOPEDIC SURGERY Left 2013    ORIF METECARPAL      THYROID BIOPSY  10/2020      Allergies   Allergen Reactions     Blood-Group Specific Substance      Non specific antibody present; 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 A delay in compatible RBCs may occur.        Ferumoxytol Other (See Comments)     Severe back pain and spasms     Latex Swelling      Social History     Tobacco Use     Smoking status: Current Every Day Smoker     Packs/day: 1.00     Types: Cigarettes     Smokeless tobacco: Never Used   Substance Use Topics     Alcohol use: Not on file      Wt Readings from Last 1 Encounters:   21 62.6 kg (138 lb 1.6 oz)        Anesthesia Evaluation   Pt has had prior anesthetic. Type: General.        ROS/MED HX  ENT/Pulmonary:  - neg pulmonary ROS     Neurologic:  - neg neurologic ROS     Cardiovascular:  - neg cardiovascular ROS     METS/Exercise Tolerance:     Hematologic: Comments: No lab results found.   No lab results found.      (+) anemia,     Musculoskeletal:  - neg musculoskeletal ROS     GI/Hepatic:  - neg GI/hepatic ROS     Renal/Genitourinary:  - neg Renal ROS     Endo:     (+) thyroid problem,  Thyroid disease - Other,      Psychiatric/Substance Use:     (+) psychiatric history anxiety Recreational drug usage: Cannabis.    Infectious Disease:  - neg infectious disease ROS     Malignancy:  - neg malignancy ROS     Other:            Physical Exam    Airway  airway exam normal      Mallampati: I   TM distance: > 3 FB   Neck ROM: full   Mouth opening: > 3 cm    Respiratory Devices and Support         Dental  no notable dental history         Cardiovascular   cardiovascular exam normal          Pulmonary   pulmonary exam normal                OUTSIDE LABS:  CBC: No results found for: WBC, HGB, HCT, PLT  BMP: No results found for: NA, POTASSIUM, CHLORIDE, CO2, BUN, CR, GLC  COAGS: No results found for: PTT, INR, FIBR  POC: No results found for: BGM, HCG, HCGS  HEPATIC: No results found for: ALBUMIN, PROTTOTAL, ALT, AST, GGT, ALKPHOS, BILITOTAL, BILIDIRECT, MARISOL  OTHER: No results found for: PH, LACT, A1C, INO, PHOS, MAG, LIPASE, AMYLASE, TSH, T4, T3, CRP, SED    Anesthesia Plan     History & Physical Review      ASA Status:  2.   Plan for General with Propofol induction. Device: ETT Maintenance will be Balanced.     Additional equipment: Videolaryngoscope.    PONV prophylaxis:  Ondansetron (or other 5HT-3) and Dexamethasone or Solumedrol.       Consents  Anesthesia Plan(s) and associated risks, benefits, and realistic alternatives discussed.    Questions answered and patient/representative(s) expressed understanding.    Discussed with:  Patient.       Extended Intubation/Ventilatory Support Discussed No No     Use of blood products discussed: Yes.   Discussed with: Patient.  Consented to blood products.      Postoperative Care  Postoperative pain management: IV analgesics.           Evan Vides MD

## 2021-01-18 NOTE — ANESTHESIA CARE TRANSFER NOTE
Patient: Bonnie Webster    Procedure(s):  TOTAL THYROIDECTOMY    Diagnosis: Graves disease [E05.00]  Hashimoto's disease [E06.3]  Diagnosis Additional Information: No value filed.    Anesthesia Type:   General     Note:    Oropharynx: oral airway in place  Level of Consciousness: drowsy  Oxygen Supplementation: face mask    Independent Airway: airway patency satisfactory and stable  Dentition: dentition unchanged  Vital Signs Stable: post-procedure vital signs reviewed and stable  Report to RN Given: handoff report given  Patient transferred to: PACU    Handoff Report: Identifed the Patient, Identified the Reponsible Provider, Reviewed the pertinent medical history, Discussed the surgical course, Reviewed Intra-OP anesthesia mangement and issues during anesthesia, Set expectations for post-procedure period and Allowed opportunity for questions and acknowledgement of understanding      Vitals: (Last set prior to Anesthesia Care Transfer)  CRNA VITALS  1/18/2021 1325 - 1/18/2021 1401      1/18/2021             Pulse:  75    SpO2:  100 %    Resp Rate (observed):  11        Electronically Signed By: MCKENNA Baeza CRNA  January 18, 2021  2:01 PM

## 2021-01-18 NOTE — OR NURSING
"Pt awoke restless and disoriented.  Pt thrashing in bed trying to get up stateing \"have to pee\"  Dr Perez present and cath d/c'ed at [place] time.  Pt bulb on KHADAR has poor ablility to maintain suction and has since arrival in pacu, DR Perez made aware.  "

## 2021-01-18 NOTE — ANESTHESIA PROCEDURE NOTES
Airway   Date/Time: 1/18/2021 12:12 PM   Patient location during procedure: OR  Staff -   CRNA: Lawson Berman APRN CRNA  Performed By: CRNA    Consent for Airway   Urgency: elective    Indications and Patient Condition  Indications for airway management: tony-procedural  Induction type:inhalationalMask difficulty assessment: 0 - not attempted    Final Airway Details  Final airway type: endotracheal airway  Successful airway:NIM  Endotracheal Airway Details   ETT size (mm): 7.0  Successful intubation technique: video laryngoscopy  Grade View of Cords: 1  Adjucts: stylet  Measured from: lips  Secured at (cm): 22  Secured with: commercial tube reed and plastic tape  Bite block used: Soft    Post intubation assessment   Placement verified by: capnometry and equal breath sounds   Number of attempts at approach: 1  Number of other approaches attempted: 0  Secured with:commercial tube reed and plastic tape  Ease of procedure: easy  Dentition: Intact and Unchanged

## 2021-01-19 VITALS
HEART RATE: 64 BPM | TEMPERATURE: 99.5 F | HEIGHT: 68 IN | RESPIRATION RATE: 20 BRPM | DIASTOLIC BLOOD PRESSURE: 70 MMHG | WEIGHT: 138.1 LBS | OXYGEN SATURATION: 99 % | SYSTOLIC BLOOD PRESSURE: 117 MMHG | BODY MASS INDEX: 20.93 KG/M2

## 2021-01-19 LAB
ANION GAP SERPL CALCULATED.3IONS-SCNC: 3 MMOL/L (ref 3–14)
BUN SERPL-MCNC: 8 MG/DL (ref 7–30)
CALCIUM SERPL-MCNC: 8.2 MG/DL (ref 8.5–10.1)
CALCIUM SERPL-MCNC: 8.4 MG/DL (ref 8.5–10.1)
CHLORIDE SERPL-SCNC: 108 MMOL/L (ref 94–109)
CO2 SERPL-SCNC: 25 MMOL/L (ref 20–32)
CREAT SERPL-MCNC: 0.69 MG/DL (ref 0.52–1.04)
GFR SERPL CREATININE-BSD FRML MDRD: >90 ML/MIN/{1.73_M2}
GLUCOSE SERPL-MCNC: 105 MG/DL (ref 70–99)
POTASSIUM SERPL-SCNC: 4.2 MMOL/L (ref 3.4–5.3)
SODIUM SERPL-SCNC: 136 MMOL/L (ref 133–144)

## 2021-01-19 PROCEDURE — 250N000013 HC RX MED GY IP 250 OP 250 PS 637: Performed by: SURGERY

## 2021-01-19 PROCEDURE — 80048 BASIC METABOLIC PNL TOTAL CA: CPT | Performed by: SURGERY

## 2021-01-19 PROCEDURE — 82310 ASSAY OF CALCIUM: CPT | Performed by: SURGERY

## 2021-01-19 PROCEDURE — 36415 COLL VENOUS BLD VENIPUNCTURE: CPT | Performed by: SURGERY

## 2021-01-19 PROCEDURE — 258N000003 HC RX IP 258 OP 636: Performed by: SURGERY

## 2021-01-19 RX ORDER — LEVOTHYROXINE SODIUM 100 UG/1
100 TABLET ORAL DAILY
Qty: 60 TABLET | Refills: 1 | Status: SHIPPED | OUTPATIENT
Start: 2021-01-19 | End: 2021-02-02

## 2021-01-19 RX ORDER — HYDROCODONE BITARTRATE AND ACETAMINOPHEN 5; 325 MG/1; MG/1
1 TABLET ORAL EVERY 4 HOURS PRN
Qty: 12 TABLET | Refills: 0 | Status: SHIPPED | OUTPATIENT
Start: 2021-01-19 | End: 2021-10-19

## 2021-01-19 RX ORDER — CALCIUM CARBONATE 1250 MG/5ML
2500 SUSPENSION ORAL EVERY 8 HOURS
Qty: 500 ML | Refills: 0 | Status: SHIPPED | OUTPATIENT
Start: 2021-01-19 | End: 2021-01-19

## 2021-01-19 RX ORDER — CALCIUM CARBONATE 1250 MG/5ML
2500 SUSPENSION ORAL EVERY 8 HOURS
Qty: 500 ML | Refills: 0 | Status: SHIPPED | OUTPATIENT
Start: 2021-01-19 | End: 2021-10-19

## 2021-01-19 RX ADMIN — HYDROCODONE BITARTRATE AND ACETAMINOPHEN 1 TABLET: 5; 325 TABLET ORAL at 02:59

## 2021-01-19 RX ADMIN — CALCIUM CARBONATE 2500 MG: 1250 SUSPENSION ORAL at 00:59

## 2021-01-19 RX ADMIN — ACETAMINOPHEN 650 MG: 325 TABLET, FILM COATED ORAL at 09:06

## 2021-01-19 RX ADMIN — LEVOTHYROXINE SODIUM 100 MCG: 0.1 TABLET ORAL at 08:59

## 2021-01-19 RX ADMIN — CALCIUM CARBONATE 2500 MG: 1250 SUSPENSION ORAL at 10:11

## 2021-01-19 RX ADMIN — SODIUM CHLORIDE, POTASSIUM CHLORIDE, SODIUM LACTATE AND CALCIUM CHLORIDE: 600; 310; 30; 20 INJECTION, SOLUTION INTRAVENOUS at 05:46

## 2021-01-19 NOTE — PROGRESS NOTES
"[facility]  General Surgery Progress Note           Assessment and Plan:   Assessment:   -Graves disease, Hashimoto's disease  -POD#1 s/p Total thyroidectomy; Pathology pending  -Hypocalcemia, asymptomatic; supplemental calcium dosing  -Afebrile      Plan:   -Recheck calcium at noon  -saline lock IV  -Likely discharge later today after calcium improved.  DC Rx: levothyroxine, oscal liquid, norco.  OTC bowel program prn.  Discharge instructions were reviewed with the patient in detail.  All her questions/concerns were addressed.  She is aware that a printed copy of instructions will be given to her upon discharge.  -RTC with Dr. Perez in 1-3 weeks.  RTC with Endocrinologist in 4-6 weeks.     1329 Addendum: Calcium level increased to 8.4.  OK to discharge on current meds.  Discussed with RN.  Orders placed.  Julia Beltran PA-C        Interval History:   Sore throat with swallowing.  No hoarseness.  Tolerating meds and liquids.  No numbness/tingling issues.  Kohler out, voiding lots.  Discussed calcium recheck at noon and likely discharge at that time if level up from 8.2.  Also reviewed discharge meds.         Physical Exam:   Blood pressure 106/58, pulse 71, temperature 98.2  F (36.8  C), temperature source Oral, resp. rate 19, height 1.727 m (5' 8\"), weight 62.6 kg (138 lb 1.6 oz), last menstrual period 01/04/2021, SpO2 99 %.    I/O last 3 completed shifts:  In: 1700 [I.V.:1700]  Out: 30 [Drains:10; Blood:20]    Neck - flat, no seroma/hematoma.  Incision - clean, dry, intact.  No erythema.  Drain - No leakage around site.  Output: pink/light pink & thin.  DC'd without issue.  Chvostek's - negative          Data:     Recent Labs   Lab Test 01/19/21  0534 01/18/21 2045   INO 8.2* 8.8        Julia Beltran PA-C     "

## 2021-01-19 NOTE — PLAN OF CARE
Patient's After Visit Summary was reviewed with patient.   Patient verbalized understanding of After Visit Summary, recommended follow up and was given an opportunity to ask questions.   Discharge medications sent home with patient.   Discharged with all belongings.      OBSERVATION patient END time: 4152

## 2021-01-19 NOTE — PLAN OF CARE
"PRIMARY DIAGNOSIS: TOTAL THYROIDECTOMY  OUTPATIENT/OBSERVATION GOALS TO BE MET BEFORE DISCHARGE:  1. Stable vital signs: Yes  2. Tolerating diet: sips of liquids  3. Pain controlled with oral pain medications:  Yes  4. Positive bowel sounds:  Yes  5. Voiding without difficulty:  Yes  6. Able to ambulate:  Yes  7. Provider specific discharge goals met:  No    /53 (BP Location: Right arm)   Pulse 78   Temp 97.9  F (36.6  C) (Oral)   Resp 21   Ht 1.727 m (5' 8\")   Wt 62.6 kg (138 lb 1.6 oz)   LMP 01/04/2021 (Approximate)   SpO2 99%   BMI 21.00 kg/m      Discharge Planner Nurse   Safe discharge environment identified: Yes  Barriers to discharge: Yes       Entered by: Angie Santos 01/19/2021 4:26 AM     Please review provider order for any additional goals.   Nurse to notify provider when observation goals have been met and patient is ready for discharge.    Patient alert and oriented x4. Rates pain 5-7/10 in neck, PRN norco administered and controlling pain per patient. Neck incision dressing CDI, denies numbness or tingling, KHADAR drain to bulb suction with serosanguinous drainage. Up SBA. Voiding well. IVF infusing at 100 mL/hr. Clear liquids, advance diet as tolerated - sips of water and tea so far, patient states she does not have much of an appetite currently. Denies nausea at this time. Continuous capnography monitoring patient. Contact precautions maintained for hx MRSA. Will continue with plan of care.   "

## 2021-01-19 NOTE — PLAN OF CARE
PRIMARY DIAGNOSIS: Total Thyroidectomy  OUTPATIENT/OBSERVATION GOALS TO BE MET BEFORE DISCHARGE:  1. Stable vital signs Yes  2. Tolerating diet:Yes  3. Pain controlled with oral pain medications:  Yes  4. Positive bowel sounds:  Yes  5. Voiding without difficulty:  Yes  6. Able to ambulate:  Yes  7. Provider specific discharge goals met:   Not yet - calcium lab to be drawn at noon    Discharge Planner Nurse   Safe discharge environment identified: Yes  Barriers to discharge: Not if noon calcium comes back wnl            Please review provider order for any additional goals.   Nurse to notify provider when observation goals have been met and patient is ready for discharge.

## 2021-01-19 NOTE — PLAN OF CARE
"PRIMARY DIAGNOSIS: TOTAL THYROIDECTOMY  OUTPATIENT/OBSERVATION GOALS TO BE MET BEFORE DISCHARGE:  1. Stable vital signs: Yes  2. Tolerating diet: sips of liquids  3. Pain controlled with oral pain medications:  Yes  4. Positive bowel sounds:  Yes  5. Voiding without difficulty:  Yes  6. Able to ambulate:  Yes  7. Provider specific discharge goals met:  No    /64 (BP Location: Right arm)   Pulse 70   Temp 97.9  F (36.6  C) (Oral)   Resp 18   Ht 1.727 m (5' 8\")   Wt 62.6 kg (138 lb 1.6 oz)   LMP 01/04/2021 (Approximate)   SpO2 100%   BMI 21.00 kg/m      Discharge Planner Nurse   Safe discharge environment identified: Yes  Barriers to discharge: Yes       Entered by: Angie Santos 01/19/2021 12:10 AM     Please review provider order for any additional goals.   Nurse to notify provider when observation goals have been met and patient is ready for discharge.    Patient alert and oriented x4. Rates pain 5-6/10 in neck, PRN norco administered and controlling pain per patient. Neck incision dressing CDI, denies numbness or tingling, KHADAR drain to bulb suction with serosanguinous drainage. Up SBA. Voiding well. IVF infusing at 100 mL/hr. Clear liquids, advance diet as tolerated - sips of water and tea so far, patient states she does not have much of an appetite currently. Denies nausea at this time. Continuous capnography monitoring patient. Contact precautions maintained for hx MRSA. Will continue with plan of care.   "

## 2021-01-19 NOTE — DISCHARGE INSTRUCTIONS
HOME CARE FOLLOWING THYROIDECTOMY/LOBECTOMY  Kael Blankenship, SATINDER Perez, LEANN Ozuna    Special instructions for Bonnie Webster:  --Discharge medications: Calcium supplement, Levothyroxine and Norco  --stop Iodine drops.  Calcium Taper Schedule:  Take 10mL every 8 hours for one week, then decrease to 10mL every 12 hours for one week, then decrease to 5mL every 12 hours for one week, then decrease to 5mL daily until gone.  --Follow up appointment with Dr. Perez in 1-3 weeks, follow up with Endocrinologist in 4-6 weeks.     RESULTS:  Call the office regarding your final pathology report if you have not received your results after 2 full business days.     INCISIONAL CARE:    You may remove the dressing 24 hours after the drain was removed; replace the gauze if it becomes soiled.    You may expect a small amount of drainage from your previous drain site.    After removing the dressing, you may shower.  Do not submerse incision in water for 1 week.    Sutures will absorb and do not need to be removed.    Leave the steri-strips (white paper tapes) in place until they fall off, or remove at 2 weeks after surgery.    A lump/ridge under the incision is normal and will gradually resolve.    ACTIVITY:  Light Activity -- you may immediately be up and about as tolerated.  Driving -- you may drive when comfortable and off narcotic pain medications.  Light Work -- resume when comfortable off pain medications.  (If you can drive, you probably can work.)  Strenuous Work/Activity -- limit lifting to less than 10 pounds for 1 week.  Progressively increase with time.  Active Sports (running, biking, etc.) -- cautiously resume after 1 week.    DIET:  No restrictions.  Increased fluid intake is recommended. While taking pain medications, increase dietary fiber or add a fiber supplementation like Metamucil or Citrucel to help prevent constipation - a possible side effect of pain medications.    DISCOMFORT:  Use pain medications as  prescribed by your surgeon.  Take the pain medication with some food, when possible, to minimize side effects.  Intermittent use of ice packs may help during the first 48 hours.  Expect gradual improvement.    CALCIUM SUPPLEMENTATION:  Some patients are prescribed to take a calcium supplement after surgery.  Please take as prescribed.  Watch for symptoms of numbness or tingling around the mouth or in the fingers or toes.  This may be a sign of a low calcium level. If this happens, take an extra dose of your calcium supplement.  If the symptoms persist, please contact the office.  You may need to have your blood calcium level checked by doing a simple blood test.  We may also need to make adjustments in your calcium dose.  **Not all patients require calcium monitoring and supplementation post-op.  If your calcium was monitored and stabilized after surgery, the risk of having issues with low calcium once you are home is very small.    RETURN APPOINTMENT:  Schedule a follow-up visit 1-3 weeks post-op (you may do this any time after surgery is scheduled).  Office Phone:  [phone]       CONTACT US IF THE FOLLOWING DEVELOPS:   1. A fever that is above 101     2. If there is a large amount of drainage, bleeding, or swelling.   3. Severe pain that is not relieved by your prescription.   4. Drainage that is thick, cloudy, yellow, green or white.   5. Any other questions not answered by  Frequently Asked Questions  sheet.      FREQUENTLY ASKED QUESTIONS:    Q:  How should my incision look?    A:  Normally your incision will appear slightly swollen with light redness directly along the incision itself as it heals.  It may feel like a bump or ridge as the healing/scarring happens, and over time (3-4 months) this bump or ridge feeling should slowly go away.  In general, clear or pink watery drainage can be normal at first as your incision heals, but should decrease over time.    Q:  How do I know if my incision is  infected?  A:  Look at your incision for signs of infection, like redness around the incision spreading to surrounding skin, or drainage of cloudy or foul-smelling drainage.  If you feel warm, check your temperature to see if you are running a fever.    **If any of these things occur, please notify the nurse at our office.  We may need you to come into the office for an incision check.      Q:  How do I take care of my incision?  A:  If you have a dressing in place - Starting the day after surgery, replace the dressing 1-2 times a day until there is no further drainage from the incision.  At that time, a dressing is no longer needed.  Try to minimize tape on the skin if irritation is occurring at the tape sites.  If you have significant irritation from tape on the skin, please call the office to discuss other method of dressing your incision.    Small pieces of tape called  steri-strips  may be present directly overlying your incision; these may be removed 10 days after surgery unless otherwise specified by your surgeon.  If these tapes start to loosen at the ends, you may trim them back until they fall off or are removed.      Q:  There is a piece of tape or a sticky  lead  still on my skin.  Can I remove this?  A:  Sometimes the sticky  leads  used for monitoring during surgery or for evaluation in the emergency department are not all removed while you are in the hospital.  These sometimes have a tab or metal dot on them.  You can easily remove these on your own, like taking off a band-aid.  If there is a gel substance under the  lead , simply wipe/clean it off with a washcloth or paper towel.      Q:  What can I do to minimize constipation (very hard stools, or lack of stools)?  A:  Stay well hydrated.  Increase your dietary fiber intake or take a fiber supplement -with plenty of water.  Walk around frequently.  You may consider an over-the-counter stool-softener.  Your Pharmacist can assist you with choosing one  that is stocked at your pharmacy.  Constipation is also one of the most common side effects of pain medication.  If you are using pain medication, be pro-active and try to PREVENT problems with constipation by taking the steps above BEFORE constipation becomes a problem.    Q:  What do I do if I need more pain medications?  A:  Call the office to receive refills.  Be aware that certain pain meds cannot be called into a pharmacy and actually require a paper prescription.  A change may be made in your pain med as you progress thru your recovery period or if you have side effects to certain meds.    --Pain meds are NOT refilled after 5pm on weekdays, and NOT AT ALL on the weekends, so please look ahead to prevent problems.      Q:  Why am I having a hard time sleeping now that I am at home?  A:  Many medications you receive while you are in the hospital can impact your sleep for a number of days after your surgery/hospitalization.  Decreased level of activity and naps during the day may also make sleeping at night difficult.  Try to minimize day-time naps, and get up frequently during the day to walk around your home during your recovery time.  Sleep aides may be of some help, but are not recommended for long-term use.      Q:  I am having some back discomfort.  What should I do?  A:  This may be related to certain positioning that was required for your surgery, extended periods of time in bed, or other changes in your overall activity level.  You may try ice, heat, acetaminophen, or ibuprofen to treat this temporarily.  Note that many pain medications have acetaminophen in them and would state this on the prescription bottle.  Be sure not to exceed the maximum of 4000mg per day of acetaminophen.     **If the pain you are having does not resolve, is severe, or is a flare of back pain you have had on other occasions prior to surgery, please contact your primary physician for further recommendations or for an appointment  to be examined at their office.    Q:  Why am I having headaches?  A:  Headaches can be caused by many things:  caffeine withdrawal, use of pain meds, dehydration, high blood pressure, lack of sleep, over-activity/exhaustion, flare-up of usual migraine headaches.  If you feel this is related to muscle tension (a band-like feeling around the head, or a pressure at the low-back of the head) you may try ice or heat to this area.  You may need to drink more fluids (try electrolyte drink like Gatorade), rest, or take your usual migraine medications.   **If your headaches do not resolve, worsen, are accompanied by other symptoms, or if your blood pressure is high, please call your primary physician for recommendation and/or examination.    Q:  I am unable to urinate.  What do I do?  A:  A small percentage of people can have difficulty urinating initially after surgery.  This includes being able to urinate only a very small amount at a time and feeling discomfort or pressure in the very low abdomen.  This is called  urinary retention , and is actually an urgent situation.  Proceed to your nearest Emergency department for evaluation (not an Urgent Care Center).  Sometimes the bladder does not work correctly after certain medications you receive during surgery, or related to certain procedures.  You may need to have a catheter placed until your bladder recovers.  When planning to go to an Emergency department, it may help to call the ER to let them know you are coming in for this problem after a surgery.  This may help you get in quicker to be evaluated.  **If you have symptoms of a urinary tract infection, please contact your primary physician for the proper evaluation and treatment.          If you have other questions, please call the office Monday thru Friday between 8am and 5pm to discuss with the nurse or physician assistant.  #[phone]    There is a surgeon ON CALL on weekday evenings and over the weekend in  case of urgent need only, and may be contacted at the same number.    If you are having an emergency, call 911 or proceed to your nearest emergency department.

## 2021-01-19 NOTE — PLAN OF CARE
"PRIMARY DIAGNOSIS: TOTAL THYROIDECTOMY  OUTPATIENT/OBSERVATION GOALS TO BE MET BEFORE DISCHARGE:  1. Stable vital signs: Yes  2. Tolerating diet: sips of liquids  3. Pain controlled with oral pain medications:  Yes  4. Positive bowel sounds:  Yes  5. Voiding without difficulty:  Yes  6. Able to ambulate:  Yes  7. Provider specific discharge goals met:  No    /65 (BP Location: Right arm)   Pulse 56   Temp 98.2  F (36.8  C) (Oral)   Resp 16   Ht 1.727 m (5' 8\")   Wt 62.6 kg (138 lb 1.6 oz)   LMP 01/04/2021 (Approximate)   SpO2 99%   BMI 21.00 kg/m      Discharge Planner Nurse   Safe discharge environment identified: Yes  Barriers to discharge: Yes       Entered by: Angie Santos 01/18/2021 10:25 PM     Please review provider order for any additional goals.   Nurse to notify provider when observation goals have been met and patient is ready for discharge.    Patient alert and oriented x4. Rates pain 6/10 in neck, PRN norco administered. Neck incision dressing CDI, denies numbness or tingling, KHADAR drain to bulb suction with serosanguinous drainage. Up SBA. Voiding well. IVF infusing at 100 mL/hr. Clear liquids, advance diet as tolerated - sips of water and tea so far, patient states she does not have much of an appetite currently. Denies nausea at this time. Continuous capnography monitoring patient. Will continue with plan of care.   "

## 2021-01-19 NOTE — PHARMACY-ADMISSION MEDICATION HISTORY
Medication reconciliation interview completed by pre-admitting nurse Ciara Ojeda, reviewed by pharmacy. No further clarifications needed.       Prior to Admission medications    Medication Sig Last Dose Taking? Auth Provider   strong iodine (LUGOL'S) 5 % solution Take 5 drops TID x 5 days before surgery Past Week at Unknown time Yes Laura Cedillo PA-C

## 2021-01-20 LAB — COPATH REPORT: NORMAL

## 2021-01-28 NOTE — PROGRESS NOTES
Progress Note/Post-op Follow up:  Bonnie is here for follow up after total thyroidectomy 2+ weeks ago.  She reports fair energy.  Denies numbness or tingling.  Incisional discomfort is slowly improving.    Smoking History:  currently smokes.  Advised about smoking cessation.  benefits of quitting    Physical Exam:  LMP 01/04/2021 (Approximate)   General:  Appears well, in no acute distress  HEENT:  Chvostek's sign is negative.   Neck:  Incision site healing nicely, Healing ridge is average.             Range of motion is normal.  Neuro:  Voice is Normal.    Pathology:  Benign nodular hyperplasia, Grave's disease and no malignancy    Assessment and Plan:  Bonnie is doing well after total thyroidectomy.  I recommend a follow up with Dr. Marcela Borrero in the next few weeks for follow up.  I would be happy to see her if there are any ongoing issues, but currently, there are no signs of post-operative complications.    Stefani Perez MD  Please route or send letter to:  Primary Care Provider (PCP) and Referring Provider    1/28/2021       Dr. Marcela Borrero     Re: Bonnie Webster     Dear Marcela :    I saw Bonnie back today following total thyroidectomy. She feels fine, still sore.  The incision site is healing nicely. Her voice is normal and her Chvostek's is negative.  She reports fair energy.    I have included the operative and pathology reports for your records.  As you can see, the pathology is benign.  She will schedule a follow-up appointment to see you sometime in the next month.  Thank you for asking me to see this delightful patient.  Please contact me if you have any questions regarding her surgical care.    Sincerely,           Stefani Perez MD         Please send letter to:  Referring Provider/Endocrinologist and PCP.  Operative and pathology report to Dr. Marcela Borrero please.

## 2021-02-02 ENCOUNTER — OFFICE VISIT (OUTPATIENT)
Dept: SURGERY | Facility: CLINIC | Age: 41
End: 2021-02-02
Payer: COMMERCIAL

## 2021-02-02 VITALS
BODY MASS INDEX: 20.92 KG/M2 | DIASTOLIC BLOOD PRESSURE: 70 MMHG | SYSTOLIC BLOOD PRESSURE: 100 MMHG | HEIGHT: 68 IN | RESPIRATION RATE: 16 BRPM | HEART RATE: 77 BPM | WEIGHT: 138 LBS | OXYGEN SATURATION: 100 %

## 2021-02-02 DIAGNOSIS — E89.0 S/P TOTAL THYROIDECTOMY: ICD-10-CM

## 2021-02-02 PROCEDURE — 99024 POSTOP FOLLOW-UP VISIT: CPT | Performed by: SURGERY

## 2021-02-02 RX ORDER — LEVOTHYROXINE SODIUM 100 UG/1
100 TABLET ORAL DAILY
Qty: 60 TABLET | Refills: 1 | Status: SHIPPED | OUTPATIENT
Start: 2021-02-02 | End: 2021-10-19

## 2021-02-02 ASSESSMENT — MIFFLIN-ST. JEOR: SCORE: 1339.46

## 2021-02-02 NOTE — LETTER
2021    RE: Bonnie Webster, : 1980      Progress Note/Post-op Follow up:  Bonnie is here for follow up after total thyroidectomy 2+ weeks ago.  She reports fair energy.  Denies numbness or tingling.  Incisional discomfort is slowly improving.     Smoking History:  Currently smokes.  Advised about smoking cessation.  benefits of quitting     Physical Exam:  LMP 2021 (Approximate)   General:  Appears well, in no acute distress  HEENT:  Chvostek's sign is negative.   Neck:  Incision site healing nicely, Healing ridge is average.             Range of motion is normal.  Neuro:  Voice is Normal.     Pathology:  Benign nodular hyperplasia, Grave's disease and no malignancy     Assessment and Plan:  Bonnie is doing well after total thyroidectomy.  I recommend a follow up with Dr. Marcela Borrero in the next few weeks for follow up.  I would be happy to see her if there are any ongoing issues, but currently, there are no signs of post-operative complications.     Stefani Perez MD   ---

## 2021-02-19 ENCOUNTER — OFFICE VISIT - HEALTHEAST (OUTPATIENT)
Dept: INTERNAL MEDICINE | Facility: CLINIC | Age: 41
End: 2021-02-19

## 2021-02-19 DIAGNOSIS — Z71.6 ENCOUNTER FOR TOBACCO USE CESSATION COUNSELING: ICD-10-CM

## 2021-02-19 DIAGNOSIS — E89.0 H/O TOTAL THYROIDECTOMY: ICD-10-CM

## 2021-02-19 DIAGNOSIS — D64.9 ANEMIA, UNSPECIFIED TYPE: ICD-10-CM

## 2021-03-01 ENCOUNTER — OFFICE VISIT - HEALTHEAST (OUTPATIENT)
Dept: FAMILY MEDICINE | Facility: CLINIC | Age: 41
End: 2021-03-01

## 2021-03-01 DIAGNOSIS — R60.1 GENERALIZED EDEMA: ICD-10-CM

## 2021-03-01 DIAGNOSIS — D50.8 OTHER IRON DEFICIENCY ANEMIA: ICD-10-CM

## 2021-03-01 LAB
ALBUMIN SERPL-MCNC: 4.3 G/DL (ref 3.5–5)
ALP SERPL-CCNC: 38 U/L (ref 45–120)
ALT SERPL W P-5'-P-CCNC: 10 U/L (ref 0–45)
ANION GAP SERPL CALCULATED.3IONS-SCNC: 7 MMOL/L (ref 5–18)
AST SERPL W P-5'-P-CCNC: 19 U/L (ref 0–40)
BILIRUB SERPL-MCNC: 0.3 MG/DL (ref 0–1)
BUN SERPL-MCNC: 12 MG/DL (ref 8–22)
CALCIUM SERPL-MCNC: 8.8 MG/DL (ref 8.5–10.5)
CHLORIDE BLD-SCNC: 105 MMOL/L (ref 98–107)
CO2 SERPL-SCNC: 26 MMOL/L (ref 22–31)
CREAT SERPL-MCNC: 0.8 MG/DL (ref 0.6–1.1)
ERYTHROCYTE [DISTWIDTH] IN BLOOD BY AUTOMATED COUNT: 14.2 % (ref 11–14.5)
GFR SERPL CREATININE-BSD FRML MDRD: >60 ML/MIN/1.73M2
GLUCOSE BLD-MCNC: 86 MG/DL (ref 70–125)
HCT VFR BLD AUTO: 30.3 % (ref 35–47)
HGB BLD-MCNC: 9.6 G/DL (ref 12–16)
MCH RBC QN AUTO: 29 PG (ref 27–34)
MCHC RBC AUTO-ENTMCNC: 31.7 G/DL (ref 32–36)
MCV RBC AUTO: 92 FL (ref 80–100)
PLATELET # BLD AUTO: 254 THOU/UL (ref 140–440)
PMV BLD AUTO: 9.8 FL (ref 7–10)
POTASSIUM BLD-SCNC: 4.2 MMOL/L (ref 3.5–5)
PROT SERPL-MCNC: 7.1 G/DL (ref 6–8)
RBC # BLD AUTO: 3.31 MILL/UL (ref 3.8–5.4)
SODIUM SERPL-SCNC: 138 MMOL/L (ref 136–145)
T4 FREE SERPL-MCNC: 0.5 NG/DL (ref 0.7–1.8)
TSH SERPL DL<=0.005 MIU/L-ACNC: 69.59 UIU/ML (ref 0.3–5)
WBC: 3.6 THOU/UL (ref 4–11)

## 2021-03-02 LAB
FERRITIN SERPL-MCNC: 7 NG/ML (ref 10–130)
IRON SATN MFR SERPL: 6 % (ref 20–50)
IRON SERPL-MCNC: 28 UG/DL (ref 42–175)
TIBC SERPL-MCNC: 445 UG/DL (ref 313–563)
TRANSFERRIN SERPL-MCNC: 356 MG/DL (ref 212–360)

## 2021-03-05 ENCOUNTER — COMMUNICATION - HEALTHEAST (OUTPATIENT)
Dept: FAMILY MEDICINE | Facility: CLINIC | Age: 41
End: 2021-03-05

## 2021-03-26 ENCOUNTER — AMBULATORY - HEALTHEAST (OUTPATIENT)
Dept: PHARMACY | Facility: HOSPITAL | Age: 41
End: 2021-03-26

## 2021-05-18 ENCOUNTER — OFFICE VISIT - HEALTHEAST (OUTPATIENT)
Dept: FAMILY MEDICINE | Facility: CLINIC | Age: 41
End: 2021-05-18

## 2021-05-18 DIAGNOSIS — D50.0 IRON DEFICIENCY ANEMIA DUE TO CHRONIC BLOOD LOSS: ICD-10-CM

## 2021-05-18 DIAGNOSIS — D51.0 PERNICIOUS ANEMIA: ICD-10-CM

## 2021-05-18 DIAGNOSIS — E04.1 THYROID NODULE: ICD-10-CM

## 2021-05-18 DIAGNOSIS — M54.50 MIDLINE LOW BACK PAIN WITHOUT SCIATICA, UNSPECIFIED CHRONICITY: ICD-10-CM

## 2021-05-18 DIAGNOSIS — F32.0 MILD MAJOR DEPRESSION (H): ICD-10-CM

## 2021-05-26 ASSESSMENT — PATIENT HEALTH QUESTIONNAIRE - PHQ9: SUM OF ALL RESPONSES TO PHQ QUESTIONS 1-9: 27

## 2021-05-26 NOTE — PROGRESS NOTES
Short term disability paperwork filled out and signed by Dr Burkett.  It was then faxed to [facility] at [phone] at 0877 on 3/22/19.    Copy has been sent to HIM to be scanned.    Amisha Arnold RN

## 2021-05-27 VITALS
OXYGEN SATURATION: 99 % | HEART RATE: 69 BPM | DIASTOLIC BLOOD PRESSURE: 71 MMHG | SYSTOLIC BLOOD PRESSURE: 107 MMHG | RESPIRATION RATE: 14 BRPM

## 2021-05-27 ASSESSMENT — PATIENT HEALTH QUESTIONNAIRE - PHQ9
SUM OF ALL RESPONSES TO PHQ QUESTIONS 1-9: 11
SUM OF ALL RESPONSES TO PHQ QUESTIONS 1-9: 24

## 2021-05-27 NOTE — PROGRESS NOTES
Pt came into infusion for her last IV Iron and B12 injection. Pt hypotensive. Pt reports no more dizziness than usual. Pt tolerated Iron well. Post BP 85/59. Pt states she feels like she has been drinking enough fluids. Offered to try to contact pt's MD to see if any interventions are needed. Pt state her MD is not in today but she will check her BP at work tomorrow and notify him if it is still low. Pt was instructed to report to ED or call 911 for any fainting episodes. Pt verbalized understanding of this and left infusion clinic via ambulatory.

## 2021-05-27 NOTE — TELEPHONE ENCOUNTER
Jazzmine Ingram just wondring whats going on with my fmla papers that were sent to [facility] on 3/26/2019,its becoming impossible to come to work everyday. My feet and legs hurt everyday I have to take my shoes off as soon as I get here. And I get headaches that last for days.not to mention Im very sleepy all the time. Am not asking you to lie or give me any special treatment. But I need [place] to protect my job I have been missing a lot more work. Because this is taking a physical, mental tole on me and my body. My head hurts so bad today its making my ears hurt. And I cant continue like this. So if you could please update me that would be greatly appreciated

## 2021-05-27 NOTE — PROGRESS NOTES
Office Visit - Follow Up   Bonnie Webster   38 y.o. female    Date of Visit: 4/10/2019    Chief Complaint   Patient presents with     Lab Result Follow Up     iron         Assessment and Plan   1. Pernicious anemia  Continue vitamin B12 injections as well as oral vitamin B12.  Based on the severity of her deficiency and positive antibodies, I think she is someone who will likely need injections long-term.    2. Iron deficiency anemia due to chronic blood loss  Likely related to both her pernicious anemia as well as her blood loss through heavy menses.  Status post iron infusion with improvement in hemoglobin, continue oral iron and she may need periodic infusions.  Follow-up with gynecology regarding menorrhagia.  She may be a candidate for trans-examined acid    3. History of hyperthyroidism  - Thyroid Stimulating Hormone (TSH); Future  - T4, Free; Future  - T3, Total; Future    4. Menorrhagia with regular cycle  Follow-up with gynecology    Return in about 1 month (around 5/8/2019) for recheck.     History of Present Illness   This 38 y.o. old woman comes in for follow-up of anemia and body aches.  She struggled quite a bit with iron infusions experiencing significant muscle and leg pain.  Since stopping infusions after her sixth infusion she feels much better.  She has received 6 injections of vitamin B12 and she is also taking oral vitamin B12.  Additionally she is taking oral iron.  Her hemoglobin has increased to over 9.  She is quite pleased.  She does need some paperwork filled out so that she can miss work related to her infusions and office visits.  She does have a history of hyperthyroidism as a child.  She does not recall that she was ever told she had Graves' disease.  She has not had thyroid issues recently.  She has not had a menstrual cycle since she started iron infusions which is unusual.  She is not currently having sex and feels her risk for pregnancy is 0.    Review of Systems: A comprehensive  "review of systems was negative except as noted.     Medications, Allergies and Problem List   Reviewed, reconciled and updated     Physical Exam   General Appearance:   No acute distress    /62 (Patient Site: Left Arm, Patient Position: Sitting, Cuff Size: Adult Regular)   Pulse (!) 52   Ht 5' 8.5\" (1.74 m)   Wt 127 lb (57.6 kg)   SpO2 98%   BMI 19.03 kg/m      HEENT exam is unremarkable  Neck supple no thyromegaly or nodule palpable  Lymphatic no cervical lymphadenopathy  Cardiovascular regular rate and rhythm no murmur gallop or rub  Pulmonary lungs are clear to auscultation bilaterally  Gastrointestinall abdomen soft nontender nondistended no organomegaly  Neurologic exam is non focal  Psychiatric pleasant, no confusion or agitation        Additional Information   Current Outpatient Medications   Medication Sig Dispense Refill     ferrous sulfate 325 (65 FE) MG tablet Take 1 tablet (325 mg total) by mouth 2 (two) times a day with meals. Take with orange juice for better absorption. 60 tablet 0     naproxen (NAPROSYN) 500 MG tablet Take 1 tablet (500 mg total) by mouth 3 (three) times a day as needed (take with sumatriptan). 30 tablet 3     ondansetron (ZOFRAN) 4 MG tablet Take 1 tablet (4 mg total) by mouth daily as needed for nausea. 30 tablet 1     SUMAtriptan (IMITREX) 50 MG tablet Take 1 tablet (50 mg total) by mouth every 2 (two) hours as needed for migraine (max 200mg in 24hr period). 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 A delay in compatible RBCs may occur.     Latex Swelling     Social History     Tobacco Use     Smoking status: Current Every Day Smoker     Packs/day: 0.10     Smokeless tobacco: Never Used     Tobacco comment: 4-5 daily   Substance Use Topics     Alcohol use: No     Drug use: No       Review and/or order of clinical lab tests:  Review and/or order of radiology tests:  Review and/or order of medicine tests:  Discussion of test results with performing physician:  Decision to obtain old records and/or obtain history from someone other than the patient:  Review and summarization of old records and/or obtaining history from someone other than the patient and.or discussion of case with another health care provider:  Independent visualization of image, tracing or specimen itself:    Time:      Samson Ingram MD

## 2021-05-27 NOTE — PROGRESS NOTES
[facility] Hematology and Oncology Progress Note    Patient: Bonnie Webster  MRN: [id number]  Date of Service: 04/12/2019        Reason for Visit    Chief Complaint   Patient presents with     Benign Hematology       Assessment and Plan  Cancer Staging  No matching staging information was found for the patient.    ECOG Performance   ECOG Performance Status: 1     Distress Assessment  Distress Assessment Score: 4    Pain  Currently in Pain: Yes  Pain Score (Initial OR Reassessment): 5  Location: Ha, Left leg and left lower back    #.  Severe iron deficiency anemia with microcytic anemia  #.  Vitamin B12 deficiency with positive intrinsic factor blocking antibody.  #.  Menometrorrhagia  #.  Positive alloantibodies from prior multiple transfusion     We reviewed her labs and her hemoglobin has improved to around 9 g/dL.  She completed 5 doses of Venofer.  Ferritin is above 100.  Iron saturation is slightly low at 16%.  Noted normalization of MCV.  At this point, I would not recommend any further IV iron, but advised her to continue oral iron tablet 1 tablet 3 times a day as current.  I will plan to recheck her hemogram in about a month.  Follow-up with me as needed.     I also explained to her that she has vitamin B12 deficiency that she will need vitamin B12 injection for the rest of her life due to the presence of intrinsic factor blocking antibody.  She will [facility] to do that at [facility].       Plan:  -Hemogram in about a month.  -Continue oral iron to 1 tablet 3 times daily.  -Vitamin B12 1000 mcg injection once a month, lifelong.  -Strongly encouraged to follow with gynecologist regarding menstrual cycle issues and heavy bleeding.      Problem List    1. Iron deficiency anemia due to chronic blood loss  HM2(CBC w/o Differential)   2. Pernicious anemia        ______________________________________________________________________________    History of Present Illness    Bonnie presents today for follow-up.   "She is fairly overall improved.  She has now more energy to do cooking etc.  LMP was about 6 weeks ago.  Denies any possibility of pregnancy.  She reports that she tolerates oral iron tablet 1 tablet 3 times a day without any GI toxicity.    Pain Status  Currently in Pain: Yes    Review of Systems    Oncology Nurse Assessment/CMA Intake: Constitutional  Constitutional (WDL): Exceptions to WDL  Fatigue: Fatigue not relieved by rest - Limiting instrumental ADL(always tired)  Fever: None  Chills: Mild sensation of cold, shivering, chattering of teeth(often)  Weight Gain: 5 - <10% from baseline(12# 3/19/18-intentional/lost weight recently so happy she gained it back)  Neurosensory  Neurosensory (WDL): Exceptions to WDL  Peripheral Motor Neuropathy: None  Ataxia: None  Peripheral Sensory Neuropathy: Asymptomatic, loss of deep tendon reflexes or paresthesia(L leg-intermittent tingling)  Confusion: None  Syncope: None  Eye   Eye Disorder (WDL): Exceptions to WDL  Blurred Vision: Intervention not indicated(sees \"little specks\"-calls them \"little people\" because she sees them so much)  Dry Eye: None  Eye Pain: None  Watering Eyes: None  Ear  Ear Disorder (WDL): Exceptions to WDL  Ear Pain: Mild Pain(intermittent, bilat ear pain)  Tinnitus: None  Cardiovascular  Cardiovascular (WDL): Exceptions to WDL  Palpitations: None  Edema: Yes  Edema Limbs: 5 - 10% inter-limb discrepancy in volume or circumference at point of greatest visible difference, swelling or obscuration of anatomic architecture on close inspection(L leg-intermittent)  Phlebitis: None  Superficial thrombophlebitis: None  Pulmonary  Respiratory (WDL): Exceptions to WDL  Cough: None  Dyspnea: Shortness of breath with moderate exertion(improved since iron infusion)  Hypoxia: None  Gastrointestinal  Gastrointestinal (WDL): Exceptions to WDL  Anorexia: Loss of appetite without alteration in eating habits  Constipation: None  Diarrhea: Increase of <4 stools per day " over baseline, mild increase in ostomy output compared to baseline(loose)  Dysphagia: None  Esophagitis: Asymptomatic, clinical or diagnostic observations only, intervention not indicated(depends on what eats)  Nausea: Loss of appetite without alteration in eating habits(none in last 2 weeks/intermittent with HA)  Pharyngitis: None  Vomiting: None  Dysgeusia: None  Dry Mouth: None  Genitourinary  Genitourinary (WDL): Exceptions to WDL  Urinary Frequency: Present  Urinary Retention: None  Urinary Tract Pain: None  Lymphatic  Lymph (WDL): All lymph disorder elements are within defined limits  Musculoskeletal and Connective Tissue  Musculoskeletal and Connetive Tissue Disorders (WDL): Exceptions to WDL  Arthralgia: Mild pain(lower back-left)  Bone Pain: None  Muscle Weakness : None  Myalgia: Mild pain(left leg)  Integumentary  Integumentary (WDL): All integumentary elements are within defined limits  Patient Coping  Patient Coping: Accepting;Open/discussion  Distress Assessment  Distress Assessment Score: 4  Accompanied by  Accompanied by: Alone  Oral Chemo Adherence         Past History  Past Medical History:   Diagnosis Date     Anemia      B12 deficiency anemia        Physical Exam    Recent Vitals 4/12/2019   Height -   Weight 139 lbs 13 oz   BSA (m2) 1.75 m2   /55   Pulse 54   Temp 98   Temp src 1   SpO2 100   Some recent data might be hidden     General: alert, awake, not in acute distress  HEENT: Head: Normal, normocephalic, atraumatic.  Eye: Normal external eye, conjunctiva, lids cornea, MALINDA.  Ears:  Non-tender.  Nose: Normal external nose, mucus membranes and septum.  Pharynx:  Normal buccal mucosa. Normal pharynx.  Neck / Thyroid: Supple, no masses, nodes, nodules or enlargement.  Lymphatics: No abnormally enlarged lymph nodes.  Chest: Normal chest wall and respirations. Clear to auscultation.  Heart: S1 S2 RRR, no murmur.   Abdomen: abdomen is soft without significant tenderness, masses, organomegaly  or guarding  Extremities: normal strength, tone, and muscle mass  Skin: normal. no rash or abnormalities  CNS: non focal.    Lab Results    No results found for this or any previous visit (from the past 168 hour(s)).    Imaging    No results found.    TT: 30 minutes and more than 50% was spent on coordination and counseling of care.      Signed by: Steff Burkett MD

## 2021-05-27 NOTE — PROGRESS NOTES
"0945 Bonnie arrived A&Ox4 amublatory and stable. Pt reports she is always very tired, sleeping most of the time, trying to work but feeling like she may still need to take CHANA until her health improves. She has two children of her own (14 and 19) plus two foster children (12 and 14yrs old). She is frustrated and depressed that she has been so tired for so long. But she is also not a good candidate for PRBC transfusions per Izabel since she already has so many anti-bodies and concern that she may develop more with subsequent transfusions.  Bonnie confirms she is here today for venofer #4 of 5. She reports being tired after infusions.    Bonnie states she has newer onset edema to her L Leg and foot; PMD prescribed prednisone which she states is helping some. It was prescribed on 3/19, she took a dose two days then did not over the weekend, today she has two pills remaining.    PIV w ease L FA; excellent blood return and line easily flushed NS.  She will also continue to receive her B-12 injections at her work    venofer 200mg IVP given as ordered, with NS 250mL @ 200mL/hr; pt tolerated w/o sxs adverse Rxn. She was monitored 30min post infusion and was stable, she reported a headache and \"upset stomach\" after infusion. PIV dc'd, site covered w cottonball/coban. VSS    AVS reviewed and given to pt. 1120 Bonnie dc'd A&Ox4 ambulatory and stable, she is aware of her next infusion appointment.       "

## 2021-05-27 NOTE — TELEPHONE ENCOUNTER
Having new FMLA papers forwarded to you as having difficulty coming into work after the infusions as complications and side effects are getting worse. Extremely tired all the time, difficulty concentrating, sudden headache again today at work, swelling not gone, feeling it is very difficult to continue to work at the level I need to while physically I am not well. Thank you for reviewing and completing.

## 2021-05-28 ASSESSMENT — ANXIETY QUESTIONNAIRES: GAD7 TOTAL SCORE: 21

## 2021-05-28 NOTE — PROGRESS NOTES
Ms. Webster arrived at clinic for Ketorolac injection given Intramuscular at Left deltoid.      Injection was given without incidence.      Samantha ESTEVEZ LPN .......... 2:25 PM  05/01/19

## 2021-05-29 ENCOUNTER — RECORDS - HEALTHEAST (OUTPATIENT)
Dept: ADMINISTRATIVE | Facility: CLINIC | Age: 41
End: 2021-05-29

## 2021-05-29 NOTE — PROGRESS NOTES
"  Office Visit - Follow Up   Bonnie Webster   38 y.o. female    Date of Visit: 5/28/2019    Chief Complaint   Patient presents with     Cough        Assessment and Plan   1. Cough  Concerning for pneumonia given her fever chills, dyspnea and cough.  Will treat with azithromycin and obtain an x-ray  - XR Chest 2 Views  - azithromycin (ZITHROMAX) 250 MG tablet; Take 2 tablets by mouth day one and 1 tablet by mouth days 2-5  Dispense: 6 tablet; Refill: 0    2. Pernicious anemia  Taking vitamin B12    3. Tobacco abuse  Extensive discussion about cessation she will try Zyban  - buPROPion (WELLBUTRIN SR) 150 MG 12 hr tablet; Take 1 tablet (150 mg total) by mouth 2 (two) times a day.  Dispense: 60 tablet; Refill: 2    4. Iron deficiency anemia due to chronic blood loss  - HM2(CBC w/o Differential)    5. History of hyperthyroidism  - Thyroid Stimulating Hormone (TSH)  - T4, Free  - T3, Total      Return in about 1 week (around 6/4/2019) for recheck.     History of Present Illness   This 38 y.o. old woman comes in for evaluation of respiratory symptoms.  On Friday she developed significant cough, shortness of breath, fever and chills.  Right ear pain.  Symptoms significant enough that she has not smoked over the weekend.  She continues to have body aches and chills.  She is afebrile here.  She has had some diarrhea but this was only initially and no longer present.    Review of Systems: A comprehensive review of systems was negative except as noted.     Medications, Allergies and Problem List   Reviewed, reconciled and updated     Physical Exam   General Appearance:   No acute distress    /60 (Patient Site: Left Arm, Patient Position: Sitting, Cuff Size: Adult Regular)   Pulse 98   Temp 98.7  F (37.1  C) (Oral)   Ht 5' 8.5\" (1.74 m)   Wt 139 lb (63 kg)   SpO2 99%   BMI 20.83 kg/m      HEENT exam is unremarkable  Neck supple no thyromegaly or nodule palpable  Lymphatic no cervical lymphadenopathy  Cardiovascular " regular rate and rhythm no murmur gallop or rub  Pulmonary lungs are clear to auscultation bilaterally  Gastrointestinall abdomen soft nontender nondistended no organomegaly  Neurologic exam is non focal  Psychiatric pleasant, no confusion or agitation        Additional Information   Current Outpatient Medications   Medication Sig Dispense Refill     ferrous sulfate 325 (65 FE) MG tablet Take 1 tablet (325 mg total) by mouth 2 (two) times a day with meals. Take with orange juice for better absorption. 60 tablet 0     naproxen (NAPROSYN) 500 MG tablet Take 1 tablet (500 mg total) by mouth 3 (three) times a day as needed (take with sumatriptan). 30 tablet 3     ondansetron (ZOFRAN) 4 MG tablet Take 1 tablet (4 mg total) by mouth daily as needed for nausea. 30 tablet 1     SUMAtriptan (IMITREX) 50 MG tablet Take 1 tablet (50 mg total) by mouth every 2 (two) hours as needed for migraine (max 200mg in 24hr period). 10 tablet 3     azithromycin (ZITHROMAX) 250 MG tablet Take 2 tablets by mouth day one and 1 tablet by mouth days 2-5 6 tablet 0     buPROPion (WELLBUTRIN SR) 150 MG 12 hr tablet Take 1 tablet (150 mg total) by mouth 2 (two) times a day. 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 A delay in compatible RBCs may occur.     Latex Swelling     Social History     Tobacco Use     Smoking status: Current Every  Day Smoker     Packs/day: 0.10     Smokeless tobacco: Never Used     Tobacco comment: 4-5 daily   Substance Use Topics     Alcohol use: No     Drug use: No       Review and/or order of clinical lab tests:  Review and/or order of radiology tests:  Review and/or order of medicine tests:  Discussion of test results with performing physician:  Decision to obtain old records and/or obtain history from someone other than the patient:  Review and summarization of old records and/or obtaining history from someone other than the patient and.or discussion of case with another health care provider:  Independent visualization of image, tracing or specimen itself:    Time:      Samson Ingram MD

## 2021-05-31 VITALS — HEIGHT: 68 IN | WEIGHT: 135 LBS | BODY MASS INDEX: 20.46 KG/M2

## 2021-05-31 VITALS — WEIGHT: 135 LBS | HEIGHT: 68 IN | BODY MASS INDEX: 20.46 KG/M2

## 2021-05-31 NOTE — TELEPHONE ENCOUNTER
Dr. Ingram  Patient called and said that she has bacteria vaginosis.  She is wondering if you could send in a rx for the pharmacy downstairs.

## 2021-06-01 VITALS — BODY MASS INDEX: 20.92 KG/M2 | HEIGHT: 68 IN | WEIGHT: 138 LBS

## 2021-06-01 VITALS — BODY MASS INDEX: 20.92 KG/M2 | WEIGHT: 138 LBS | HEIGHT: 68 IN

## 2021-06-01 VITALS — WEIGHT: 136 LBS | BODY MASS INDEX: 20.61 KG/M2 | HEIGHT: 68 IN

## 2021-06-01 NOTE — PROGRESS NOTES
Office Visit - Follow Up   Bonnie Webster   39 y.o. female    Date of Visit: 2019         Assessment and Plan   1. Grief reaction  Counseled at length.  She is holding up well has still good thought process and plans for the future but is under going understandable grief.  She displays no alarming features of wanting to hurt herself or however we do need to monitor her symptoms and there is a high risk of the strands relating into clinical depression and anxiety.  Most of her feelings revolve around not only the grief but at the rejection of her significant other's mother.  She has been invited to the  but not the burial and this is really upsetting her.  She does have no support here no family, so she will probably go to [place] and recuperate with her mother.  I did write a letter to that effect to allow her time to recover from her loss.  She was given a limited amount of lorazepam to help her through the extreme episodes of anxiety and for sleep.  Consider Wellbutrin if she continues to have symptoms.  I did suggest she call employee assistance for the next steps to take to notify her employer.  About her different options.  - Ambulatory referral to Psychology  - LORazepam (ATIVAN) 0.5 MG tablet; Take 1 tablet (0.5 mg total) by mouth every 8 (eight) hours as needed for anxiety.  Dispense: 20 tablet; Refill: 0            No follow-ups on file.     History of Present Illness   This 39 y.o. old   Chief Complaint   Patient presents with     Concerns     boyfriend killed last wed. since unable to eat, sleep or focus on things.          Review of Systems: A comprehensive review of systems was negative except as noted.     Medications, Allergies and Problem List   Reviewed, reconciled and updated  Patient Active Problem List   Diagnosis     Pernicious anemia     Tobacco abuse     Iron malabsorption     Iron deficiency anemia due to chronic blood loss     Symptomatic anemia     Calculus of gallbladder      "Severe anemia     Adverse effect of other drugs, medicaments and biological substances, initial encounter        Physical Exam   General Appearance:       /82 (Patient Site: Left Arm)   Pulse 77   Ht 5' 8.5\" (1.74 m)   Wt 138 lb (62.6 kg)   LMP 09/07/2019 (Approximate)   SpO2 93%   BMI 20.68 kg/m                                                                                              Additional Information   Current Outpatient Medications   Medication Sig Dispense Refill     buPROPion (WELLBUTRIN SR) 150 MG 12 hr tablet Take 1 tablet (150 mg total) by mouth 2 (two) times a day. 60 tablet 2     ferrous sulfate 325 (65 FE) MG tablet Take 1 tablet (325 mg total) by mouth 2 (two) times a day with meals. Take with orange juice for better absorption. 60 tablet 0     naproxen (NAPROSYN) 500 MG tablet Take 1 tablet (500 mg total) by mouth 3 (three) times a day as needed (take with sumatriptan). 30 tablet 3     ondansetron (ZOFRAN) 4 MG tablet Take 1 tablet (4 mg total) by mouth daily as needed for nausea. 30 tablet 1     SUMAtriptan (IMITREX) 50 MG tablet Take 1 tablet (50 mg total) by mouth every 2 (two) hours as needed for migraine (max 200mg in 24hr period). 10 tablet 3     LORazepam (ATIVAN) 0.5 MG tablet Take 1 tablet (0.5 mg total) by mouth every 8 (eight) hours as needed for anxiety. 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 A delay in compatible RBCs may occur.     Latex Swelling     Social " History     Patient does not qualify to have social determinant information on file (likely too young).   Social History Narrative    Lives with her son, Sincere (2000) and daughter Semaj (2005).  Works for [employer], reception.      reports that she has been smoking. She has been smoking about 0.10 packs per day. She has never used smokeless tobacco. She reports that she does not drink alcohol or use drugs.   Past Medical History:   Diagnosis Date     Anemia      B12 deficiency anemia            Time: total time spent with the patient was 25 minutes of which >50% was spent in counseling and coordination of care     Rolanda Xiong MD
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
## 2021-06-02 VITALS — BODY MASS INDEX: 18.81 KG/M2 | WEIGHT: 127 LBS | HEIGHT: 69 IN

## 2021-06-02 VITALS — BODY MASS INDEX: 20.88 KG/M2 | HEIGHT: 67 IN | WEIGHT: 133 LBS

## 2021-06-02 VITALS — HEIGHT: 67 IN | BODY MASS INDEX: 20.88 KG/M2 | WEIGHT: 133 LBS

## 2021-06-02 VITALS — WEIGHT: 127.6 LBS | BODY MASS INDEX: 19.12 KG/M2

## 2021-06-02 VITALS — WEIGHT: 139.8 LBS | BODY MASS INDEX: 20.95 KG/M2

## 2021-06-02 VITALS — HEIGHT: 69 IN | BODY MASS INDEX: 19.18 KG/M2 | WEIGHT: 129.5 LBS

## 2021-06-02 VITALS — BODY MASS INDEX: 20.25 KG/M2 | WEIGHT: 129 LBS | HEIGHT: 67 IN

## 2021-06-02 VITALS — BODY MASS INDEX: 18.81 KG/M2 | HEIGHT: 69 IN | WEIGHT: 127 LBS

## 2021-06-02 NOTE — PROGRESS NOTES
Attempted to call patient re: No Show #1.  Voicemail was full, no message left.  Elsa Gonzales, LICSW
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
## 2021-06-02 NOTE — PROGRESS NOTES
"Mental Health Visit Note    10/23/2019    Start time: 12:00    Stop Time: 12:30   Session # 3    Session Type: Patient is presenting for an Individual session.   Persons present include patient and therapist.     Bonnie Webster is a 39 y.o. female is being seen today for    Chief Complaint   Patient presents with      Follow Up     Depression     low mood with psychosocial stressors and loss     Anxiety     anxiety with psychosocial stressors and loss   .     New symptoms or complaints: Stress, low mood, anxiety with daughter running away multiple times    Functional Impairment:   Personal: 4  Family: 4  Work: 4  Social:4    Clinical assessment of mental status: [Same MS as 10/3/2019]  Patient's grooming is Well groomed, attire is Appropriate, and age appears Appears Stated. Behavior towards examiner is Cooperative, motor activity is Within normal, and eye contact is Appropriate.  Patient's mood is Sad, and affect is Congruent w/content of speech, Tearful, Anxious and Depressed.  Speech/language is Pressured, attention is Within normal, and concentration is Within normal. Thought process is Within normal, thought content is Within noraml and  Within normal.  Patient's orientation is X 3, memory is  No Evidence of Impairment, judgement is No Evidence of Impairment, and estimated intelligence is Average. Demonstrated insight is Adequate while fund of knowledge is adequate.    Suicidal/Homicidal Ideation present: Yes: Passive SI without plan or intent. Patient is agreeable to safety plan of calling 911 or presenting to ER should she have impulses to harm herself.    Patient's impression of their current status:   \"I just can't take all the stress with her on top of everything.\"  Patient reports mood as low, sad, anxious.   She's sleeping as little as two hours/night.  Spending many hours at the graveside of her  (recently murdured) boyfriend.  Has not returned to work.  Patient processes stress around " "daughter who is just home again after running away multiple times.  Processes stress around the father of the daughter not offering financial or parenting support.  Stressed about illness of her 80 year old grandmother who is in [place].  Patient not able to stay to complete diagnostic assessment: received text during session from adult son that teen daughter is \"on the run\" again, needed to leave session to go try to find her.     Patient reports mixed follow up with therapy homework: Did not complete DA paperwork. She attempted to go to [place] to visit her mother in the hopes of getting emotional support, but due to interpersonal difficulties she had to return home to [place] after only one day. Did ask her friend to go with her to the [facility] site. She did not re-connect with MD regarding medicine to sleep or to discuss her concerns about addiction (still has not taken medication prescribed). Agreeable to call 911 or present to ER if she has impulses to harm herself.    Therapist impression of patients current state: Patient continues with acute stress, anxiety, depression related to recent loss, parenting, and likely PTSD.  She is open and cooperative in session but barely able to make use of care due to chaos and urgent family issues. Not yet able to participate in Diagnostic Assessment (did not do paperwork, did not stay for full session).     Processed negative cognitions, reinforced strengths, validated patient efforts and feelings.   Discussed with patient writer's concern about her ability to function (lack of sleep, not eating, doesn't feel able to work). Discussed Partial Hospital again for psychiatry, sleep, difficulty functioning.  Patient left session before decision was made, due to her learning that her daughter ran away again and needing to go and get her.   Discussed and problem-solved what she needs to do regarding daughter's running away and truancy, prevent further chaos from legal consequences.    Type " of psychotherapeutic technique provided: Client centered, Solution-focused and CBT    Progress toward short term goals:Mixed Progress:  Did not complete DA paperwork. She attempted to go to [place] to visit her mother in the hopes of getting emotional support, but due to interpersonal difficulties she had to return home to [place] after only one day. Did ask her friend to go with her to the burial site. She did not re-connect with MD regarding medicine to sleep or to discuss her concerns about addiction (still has not taken medication prescribed). Agreeable to call 911 or present to ER if she has impulses to harm herself.    Review of long term goals: Not done at today's visit    Diagnosis:   1. Adjustment disorder with mixed anxiety and depressed mood    2.  PTSD, preliminary    Plan and Follow up: Patient will return in one week for follow up.  She will practice 1 of several self care options 5 of 7 days.  She will complete reporting to police and school regarding her daughter's running away and truancy behaviors.  She will reconnect with MD regarding her concerns about addiction and specific request for medication for sleep.  She will continue to ask for emotional support from 1 or 2 friends.  She will call 911 or present to ER if she has impulses to harm her self.      Discharge Criteria/Planning: Patient will continue with follow-up until therapy can be discontinued without return of signs and symptoms.    LOLI Meek, LICSW  10/23/2019
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
## 2021-06-03 VITALS
SYSTOLIC BLOOD PRESSURE: 114 MMHG | WEIGHT: 138 LBS | OXYGEN SATURATION: 93 % | HEIGHT: 69 IN | HEART RATE: 77 BPM | DIASTOLIC BLOOD PRESSURE: 82 MMHG | BODY MASS INDEX: 20.44 KG/M2

## 2021-06-03 VITALS — BODY MASS INDEX: 20.59 KG/M2 | HEIGHT: 69 IN | WEIGHT: 139 LBS

## 2021-06-03 NOTE — TELEPHONE ENCOUNTER
RECEIVED REQUEST FOR MEDICAL RECORDS AND COMPLETION OF PAPERWORK - SENT MEDICAL RECORD REQUEST TO HIM AND PLACED PAPERWORK IN PROVIDER'S CLINIC MAILBOX - PATIENT IS SCHEDULED FOR AN APPOINTMENT November 14, 2019
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
## 2021-06-04 VITALS
SYSTOLIC BLOOD PRESSURE: 94 MMHG | HEART RATE: 62 BPM | OXYGEN SATURATION: 100 % | WEIGHT: 130 LBS | DIASTOLIC BLOOD PRESSURE: 54 MMHG | HEIGHT: 68 IN | BODY MASS INDEX: 19.7 KG/M2

## 2021-06-04 VITALS — WEIGHT: 120 LBS | BODY MASS INDEX: 18.25 KG/M2
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
## 2021-06-05 VITALS
TEMPERATURE: 97.9 F | SYSTOLIC BLOOD PRESSURE: 102 MMHG | OXYGEN SATURATION: 98 % | HEIGHT: 68 IN | HEART RATE: 62 BPM | BODY MASS INDEX: 20.92 KG/M2 | WEIGHT: 138 LBS | DIASTOLIC BLOOD PRESSURE: 58 MMHG

## 2021-06-05 VITALS
TEMPERATURE: 98 F | HEIGHT: 68 IN | WEIGHT: 134 LBS | DIASTOLIC BLOOD PRESSURE: 72 MMHG | BODY MASS INDEX: 20.31 KG/M2 | OXYGEN SATURATION: 91 % | HEART RATE: 62 BPM | SYSTOLIC BLOOD PRESSURE: 110 MMHG

## 2021-06-05 VITALS
DIASTOLIC BLOOD PRESSURE: 54 MMHG | BODY MASS INDEX: 20.31 KG/M2 | HEIGHT: 68 IN | OXYGEN SATURATION: 100 % | WEIGHT: 134 LBS | TEMPERATURE: 96.7 F | SYSTOLIC BLOOD PRESSURE: 98 MMHG | HEART RATE: 75 BPM

## 2021-06-05 VITALS
DIASTOLIC BLOOD PRESSURE: 77 MMHG | BODY MASS INDEX: 21.74 KG/M2 | WEIGHT: 143 LBS | TEMPERATURE: 97.8 F | SYSTOLIC BLOOD PRESSURE: 113 MMHG

## 2021-06-06 NOTE — TELEPHONE ENCOUNTER
Pt thinks she is experiencing early symptoms of a UTI, can she get an order for a UA????? Frequency and a little pain

## 2021-06-07 ENCOUNTER — COMMUNICATION - HEALTHEAST (OUTPATIENT)
Dept: TELEHEALTH | Facility: CLINIC | Age: 41
End: 2021-06-07

## 2021-06-07 ENCOUNTER — RECORDS - HEALTHEAST (OUTPATIENT)
Dept: INTERNAL MEDICINE | Facility: CLINIC | Age: 41
End: 2021-06-07

## 2021-06-07 NOTE — PATIENT INSTRUCTIONS - HE
Please use the information at the end of this document to sign up for [facility] [employer] where you can get your results and a message about those results sent to you through the [employer] application. If you do not have mychart we will call you with your results but it may take longer.    Regardless of if you have been tested or not:  Patient who have symptoms (cough, fever, or shortness of breath), need to isolate for 7 days from when symptoms started AND 72 hours after fever resolves (without fever reducing medications) AND improvement of respiratory symptoms (whichever is longer).      Isolate yourself at home (in own room/own bathroom if possible)    Do Not allow any visitors    Do Not go to work or school    Do Not go to Baptism,  centers, shopping, or other public places.    Do Not shake hands.    Avoid close and intimate contact with others (hugging, kissing).    Follow CDC recommendations for household cleaning of frequently touched services.     After the initial 7 days, continue to isolate yourself from household members as much as possible. To continue decrease the risk of community spread and exposure, you and any members of your household should limit activities in public for 14 days after starting home isolation.     You can reference the following CDC link for helpful home isolation/care tips:  https://www.cdc.gov/coronavirus/2019-ncov/downloads/10Things.pdf    Protect Others:    Cover Your Mouth and Nose with a mask, disposable tissue or wash cloth to avoid spreading germs to others.    Wash your hands and face frequently with soap and water    Call Back If: Breathing difficulty develops or you become worse.    For more information about COVID19 and options for caring for yourself at home, please visit the CDC website at https://www.cdc.gov/coronavirus/2019-ncov/about/steps-when-sick.html  For more options for care at [facility], please visit our website at  [email or web address]

## 2021-06-08 NOTE — TELEPHONE ENCOUNTER
Dr. Ingram    Patient is feeling very run down and and would like to schedule an iron infusion. Could you place an order for the iron infusion so patient can get scheduled in the next week.     If you need to contact her, she is at work [phone].    Thank you.

## 2021-06-09 NOTE — TELEPHONE ENCOUNTER
Authorizing: Rolanda Xiong MD in [facility]     Referral: [id number] (Pending Review)       Diagnosis: Graves disease [E05.00]   Comments     Okay to wait and consult w/Annie or should the patient be seen sooner w/ [facility] or [facility]

## 2021-06-09 NOTE — PROGRESS NOTES
"Bonnie Webster is a 39 y.o. female who is being evaluated via a billable telephone visit.      The patient has been notified of following:     \"This telephone visit will be conducted via a call between you and your physician/provider. We have found that certain health care needs can be provided without the need for a physical exam.  This service lets us provide the care you need with a short phone conversation.  If a prescription is necessary we can send it directly to your pharmacy.  If lab work is needed we can place an order for that and you can then stop by our lab to have the test done at a later time.    Telephone visits are billed at different rates depending on your insurance coverage. During this emergency period, for some insurers they may be billed the same as an in-person visit.  Please reach out to your insurance provider with any questions.    If during the course of the call the physician/provider feels a telephone visit is not appropriate, you will not be charged for this service.\"    Patient has given verbal consent to a Telephone visit? Yes    What phone number would you like to be contacted at? [phone]  Chief Complaint   Patient presents with     Hospital Visit Follow Up     D/C on Friday evening, feeling better, has 17 lb in 10 days, not able to eat, can't get anything down, not sleeping   copied from admission note :   bonnie is 39-year-old woman who presented with intractable nausea and vomiting, dehydration, severe anemia and evidence of thyroiditis.  She was given 1 unit of packed red blood cells and her hemoglobin dropped from 7-5.4 in the context of volume expansion.  She was given full dose of iron dextran and given an injection of vitamin B12.  Her ferritin was undetectable and her B12 level was 179.  She had not been compliant with B12 injections during the COVID public health crisis.  She felt better with IV fluids and antinausea medication.  Her T3 was quite elevated normal T4 and " suppressed TSH.  Ultrasound of the thyroid showed evidence of thyroiditis and multinodular goiter and some suspicious thyroid nodules.  She was given prednisone for the painful thyroiditis, Zofran for nausea and vomiting and close follow-up in clinic for resumption of vitamin B12 injections and monitoring of symptoms.    Lab Results   Component Value Date    TSH 0.05 (L) 06/18/2020     She has no vomitng but cannot sleep and has loss of appetite .        Patient would like to receive their AVS by mail     Additional provider notes: berenice has a childhood history of what appears to have been Graves disease , with exopthalmos , her mother stopped the medication after a few years and her tsh annually have been normal ever since .     Assessment/Plan:  1. Graves disease  Even though suppressed TSH and elevated TSH antibody suggests graves , ( she has no nodule on US) recommend NM scan thyroid to see if she has increased uptake . Could be hashimotos or viral thyrodisitis with transient hyperthryoidism . Start lorazpema for sleep  She doest really have any cardiac symptoms but low dose inderal may be helpful . Referral to endo. Could start her empirically with methimazole when results are in   The patient indicates understanding of these issues and agrees with the plan.    - LORazepam (ATIVAN) 0.5 MG tablet; 1-2 tablets at bedtime once daily  Dispense: 30 tablet; Refill: 1  - propranoloL (INDERAL) 20 MG tablet; Take 0.5 tablets (10 mg total) by mouth 2 (two) times a day.  Dispense: 90 tablet; Refill: 3  - NM Thyroid Uptake and Scan; Future  - Ambulatory referral to Endocrinology        Phone call duration:  15 minutes    GREGG VU

## 2021-06-09 NOTE — PROGRESS NOTES
Clinic Care Coordination Contact  [facility]/Voicemail       Clinical Data: Care Coordinator Outreach  Outreach attempted x 1.  Left message on patient's voicemail with call back information and requested return call.  Plan: Care Coordinator following up on recent hospital admission   Care Coordinator will do no further outreaches at this time.    Patient does have f/u with PCP for 6/29/2020

## 2021-06-09 NOTE — PROGRESS NOTES
Office Visit - Follow Up   Bonnie Webster   39 y.o. female    Date of Visit: 6/29/2020         Assessment and Plan   1. Hyperthyroidism  Most likely a relapse of her Graves disease , has diffuse thyroid enlargement without bruit . But because viral thyroiditis and hashimotos can also present with neck pain and initial hyperthyroidism , will do NMscan before prescribing methimazole .she is on a betablocker and is eating better   - Thyroid Stimulating Hormone (TSH)  - Thyroid Peroxidase Antibody  - Comprehensive Metabolic Panel    2. Iron deficiency anemia due to chronic blood loss  On reviewing her past history she has a long standing h/o of severe iron deficnecy anemia where her hemoglobin range has been between 5-6g/dl.  She has seen a hematologist who also noted a concomitant B12 deficiency . She cannoty tolerate oral iron and apparently only went for two of the venofer shots initially prescribed and also stopped her B12 shots . She as give one PRBC , 1000mcg B12 injection and iron dextran infusion in the hospital . Will recheck today . If sh eas given full 1000mg dose of iron then she doesn't need more unless hemoglobin still low . Will recheck in 4 weeks . Although primary cause is probabaly menorrhagia and poor dietary absorption GI blood loss needs to be addressed . She is due for a colonoscopy but will add an endoscopy to be done at the same time   - Ambulatory referral for Upper GI Endoscopy  - HM1(CBC and Differential)  - T3, Total  - T4, Free  - Thyroid Peroxidase Antibody  - HM1 (CBC with Diff)  - Comprehensive Metabolic Panel    3. Excessive menstruation at puberty  She does not want a hysterectomy . Will start birth control pill as a temporary measure to reduce bleeding but she is high risk for long term hormone use because she is smokes and is over 45 for thromboembolism . Recommend endometrial ablation   - levonorgestrel-ethinyl estradiol (NORDETTE) 0.15-0.03 mg per tablet; Take 1 tablet by mouth  "daily.  Dispense: 3 Package; Refill: 3  - Comprehensive Metabolic Panel          One month      History of Present Illness   This 39 y.o. old   Chief Complaint   Patient presents with     Weight Loss     WT up 10 lbs, very happy, not vomit as much, some nausea, neck feels swollen and ST     Anemia     Check hgb     Leg Swelling     Bilateral swelling in lower legs and down into feet    was in the hospital for intractable vomiting , severe anemia and low TSH . Is feeling much better but still very tired .   Review of Systems: A comprehensive review of systems was negative except as noted.     Medications, Allergies and Problem List   Reviewed, reconciled and updated  Patient Active Problem List   Diagnosis     Pernicious anemia     Tobacco abuse     Iron malabsorption     Iron deficiency anemia due to chronic blood loss     Symptomatic anemia     Calculus of gallbladder     Severe anemia     Adverse effect of other drugs, medicaments and biological substances, initial encounter     Adjustment disorder with mixed anxiety and depressed mood     Hypokalemia     Intractable vomiting with nausea     Hyperthyroidism     Cannabis abuse, daily use     Exophthalmos of both eyes     Thyroiditis     Thyroid nodule        Physical Exam   General Appearance:       BP 94/54 (Patient Site: Right Arm, Patient Position: Sitting, Cuff Size: Adult Large)   Pulse 62   Ht 5' 8\" (1.727 m)   Wt 130 lb (59 kg)   LMP 06/05/2020 (Approximate)   SpO2 100%   BMI 19.77 kg/m      General appearance - alert, well appearing, and in no distress  Mental status - alert, oriented to person, place, and time  Neck - supple, no significant adenopathy diffuse thyroid enlargement mild .   Lymphatics - no palpable lymphadenopathy, no hepatosplenomegaly  Chest - clear to auscultation, no wheezes, rales or rhonchi, symmetric air entry  Heart - normal rate, regular rhythm, normal S1, S2, no murmurs, rubs, clicks or gallops  Abdomen - soft, nontender, " nondistended, no masses or organomegaly  Breasts - breasts appear normal, no suspicious masses, no skin or nipple changes or axillary nodes  Pelvic - normal external genitalia, vulva, vagina, cervix, uterus and adnexa, PAP: Pap smear done today  Musculoskeletal - no joint tenderness, deformity or swelling  Extremities - peripheral pulses normal, no pedal edema, no clubbing or cyanosis  Skin - normal coloration and turgor, no rashes, no suspicious skin lesions noted                                                                                       Additional Information   Current Outpatient Medications   Medication Sig Dispense Refill     LORazepam (ATIVAN) 0.5 MG tablet 1-2 tablets at bedtime once daily 30 tablet 1     ondansetron (ZOFRAN) 4 MG tablet Take 1 tablet (4 mg total) by mouth every 8 (eight) hours as needed. 30 tablet 1     propranoloL (INDERAL) 20 MG tablet Take 0.5 tablets (10 mg total) by mouth 2 (two) times a day. 90 tablet 3     cyanocobalamin 1,000 mcg/mL injection Inject 1 mL (1,000 mcg total) into the shoulder, thigh, or buttocks every 28 days. 1 mL 0     levonorgestrel-ethinyl estradiol (NORDETTE) 0.15-0.03 mg per tablet Take 1 tablet by mouth daily. 3 Package 3     No current facility-administered medications for this visit.      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 A delay in compatible RBCs may occur.     Latex Swelling     Social History     Social History Narrative    Lives with her son, Sincere (2000) and daughter Semaj (2005).  Works for [employer], [employer].      reports that she has been smoking. She has been smoking about 0.10 packs per day. She has never used smokeless  tobacco. She reports that she does not drink alcohol or use drugs.   Past Medical History:   Diagnosis Date     Anemia      B12 deficiency anemia                 Rolanda Xiong MD

## 2021-06-09 NOTE — TELEPHONE ENCOUNTER
Dr. Xiong,     Patient has [place] paperwork for you to fill out. She has the forms and wants to know if you would prefer to schedule an appointment to fill these out of if she can give them to you to fill out. She would prefer Dr. Xiong fill these out as she has seen her last.     Please let patient know.     Leola Xavier LPN

## 2021-06-09 NOTE — TELEPHONE ENCOUNTER
She can leave them and I will fill without appointment . But I wont be in clinic till Monday . If needed urgently they can be faxed to my [employer] fax [phone].  She needs to write a note telling me exact dates she was off . Thanks

## 2021-06-10 NOTE — TELEPHONE ENCOUNTER
Dr. Xiong,     Patient complains of a tooth ache. She is requesting ibuprofen and an antibiotic sent to the [facility] pharmacy in the [facility].     Please update patient once it has been sent.     Thank you,     Leola Xavier LPN

## 2021-06-10 NOTE — TELEPHONE ENCOUNTER
Attempted to contact the patient but her voicemail was full.  If she calls please relay message below from Dr. Xiong regarding her requested prescriptions.  Thank you.  Annie GÓMEZ CMA/DEANA....................3:34 PM

## 2021-06-10 NOTE — PROGRESS NOTES
Patient discharged from therapy.  Last seen 12/5/2019. Total of 6 sessions to address issues associated with Major Depressive Disorder.    Patient did not schedule additional follow up therapy appointments.  Referred to Psychiatry, patient would consider and schedule if she made the decision to seek services. Chart review indicates lack of follow through with recommendations.

## 2021-06-11 NOTE — PROGRESS NOTES
"Bonnie Webster is a 40 y.o. female who is being evaluated via a billable telephone visit.      The patient has been notified of following:     \"This telephone visit will be conducted via a call between you and your physician/provider. We have found that certain health care needs can be provided without the need for a physical exam.  This service lets us provide the care you need with a short phone conversation.  If a prescription is necessary we can send it directly to your pharmacy.  If lab work is needed we can place an order for that and you can then stop by our lab to have the test done at a later time.    Telephone visits are billed at different rates depending on your insurance coverage. During this emergency period, for some insurers they may be billed the same as an in-person visit.  Please reach out to your insurance provider with any questions.    If during the course of the call the physician/provider feels a telephone visit is not appropriate, you will not be charged for this service.\"    Patient has given verbal consent to a Telephone visit? Yes    What phone number would you like to be contacted at? [phone]    Patient would like to receive their AVS by AVS Preference: Mail a copy.    Additional provider notes:   Chief Complaint   Patient presents with     Follow-up     Paperwork - Depression - Mold issue at home, can't be at home right now, rash on chest area x 1 day     Apartment is being renovated. And they have found mold in the walls . And she has been living in a hotel and car . They only paid for one week and now paying on her own . Middle of chest there are bunch of bumps and itchy. She is otherwise still feeling tired , has no insurance so has not been able to get her NM scan or see an endocrinologist . I did tell her that she has an appointment upcoming . No weight loss, diarrhea , or fever   Assessment/Plan:  1. Hyperthyroidism  Unclear cause , hashimotos with hyperthyroidism ersus " graves . Needs [place] NM thyroid scan . Also has iron deficiency anemia which is a chronic problem   - HM1(CBC and Differential); Future  - Thyroid Stimulating Hormone (TSH); Future  - T3, Total; Future  - T4, Free; Future    2. Dermatitis    - clobetasoL (TEMOVATE) 0.05 % ointment; aplly thin layer on skin twice a day  Dispense: 30 g; Refill: 0        Phone call duration:  12 minutes    Sandy Muniz, CMA

## 2021-06-12 NOTE — PROGRESS NOTES
Office Visit - Follow Up   Bonnie Webster   40 y.o. female    Date of Visit: 10/5/2020         Assessment and Plan   1. Hyperthyroidism  The presentation is more of a hashimotos with an inflammatory thyroiditis  rather than Graves .THe NM scan will help differentiate . If scan indicated no increase in uptake and TSH is normal , then that confirms hashimotos ( antibody is positive ) but if TSH is still suppressed and scan uptake increased then graves disease is likely and she will need methmiamzole . She could also have a normal TSH and can be monitored for the future 'burn out ' need for thyroxine supplementation . MY concern is that her multinodular goiter shows a nodule that seems to have become bigger and should be biopsied . She is very nervous about getting a biopsy . I did tell her she could have both hashimotos and thyroid cancer and it needs to be ruled out   - NM Thyroid Uptake and Scan; Future  - US BIOPSY THYROID FINE NEEDLE ASPIRATION; Future  - Comprehensive Metabolic Panel  - HM1(CBC and Differential)  - Thyroid Stimulating Hormone (TSH)  - T3, Total  - T4, Free            No follow-ups on file.     History of Present Illness   This 40 y.o. old   Chief Complaint   Patient presents with     Follow-up     Needs letter for mold in house / Right side of neck swollen,painful / not able to get thyroid scan done     has had mutltiple issues . Neck goitre  , lost her insurance didn't follow up on labs , endo referral or scans that had been ordered . Does not have thyroid symptoms . Weight has been stable . She also has severe iron deficiency anemia that needs to be followed     Review of Systems: A comprehensive review of systems was negative except as noted.     Medications, Allergies and Problem List   Reviewed, reconciled and updated  Patient Active Problem List   Diagnosis     Pernicious anemia     Tobacco abuse     Iron malabsorption     Iron deficiency anemia due to chronic blood loss      "Symptomatic anemia     Calculus of gallbladder     Severe anemia     Adverse effect of other drugs, medicaments and biological substances, initial encounter     Adjustment disorder with mixed anxiety and depressed mood     Hypokalemia     Intractable vomiting with nausea     Hyperthyroidism     Cannabis abuse, daily use     Exophthalmos of both eyes     Thyroiditis     Thyroid nodule        Physical Exam   General Appearance:       /72 (Patient Site: Left Arm, Patient Position: Sitting, Cuff Size: Adult Large)   Pulse 62   Temp 98  F (36.7  C) (Tympanic)   Ht 5' 8\" (1.727 m)   Wt 134 lb (60.8 kg)   SpO2 91%   BMI 20.37 kg/m      General appearance - alert, well appearing, and in no distress  Mental status - alert, oriented to person, place, and time  Neck - supple, no significant adenopathy has a goitre with asymmtery right lobe bigger than left with a large nodule   Lymphatics - no palpable lymphadenopathy, no hepatosplenomegaly  Chest - clear to auscultation, no wheezes, rales or rhonchi, symmetric air entry  Heart - normal rate, regular rhythm, normal S1, S2, no murmurs, rubs, clicks or gallops  Abdomen - soft, nontender, nondistended, no masses or organomegaly  No exopthalmost or tremor                                                                                        Additional Information   Current Outpatient Medications   Medication Sig Dispense Refill     clobetasoL (TEMOVATE) 0.05 % ointment aplly thin layer on skin twice a day 30 g 0     cyanocobalamin 1,000 mcg/mL injection Inject 1 mL (1,000 mcg total) into the shoulder, thigh, or buttocks every 28 days. 1 mL 0     ibuprofen (ADVIL,MOTRIN) 800 MG tablet Take 1 tablet (800 mg total) by mouth every 8 (eight) hours as needed for pain. 30 tablet 2     levonorgestrel-ethinyl estradiol (NORDETTE) 0.15-0.03 mg per tablet Take 1 tablet by mouth daily. 3 Package 3     LORazepam (ATIVAN) 0.5 MG tablet 1-2 tablets at bedtime once daily 30 tablet 1 "     ondansetron (ZOFRAN) 4 MG tablet Take 1 tablet (4 mg total) by mouth every 8 (eight) hours as needed. 30 tablet 1     propranoloL (INDERAL) 20 MG tablet Take 0.5 tablets (10 mg total) by mouth 2 (two) times a day. 90 tablet 3     No current facility-administered medications for this visit.      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 A delay in compatible RBCs may occur.     Latex Swelling     Social History     Social History Narrative    Lives with her son, Sincere (2000) and daughter Semaj (2005).  Works for [employer], Person[employer].      reports that she has been smoking. She has been smoking about 0.10 packs per day. She has never used smokeless tobacco. She reports that she does not drink alcohol or use drugs.   Past Medical History:   Diagnosis Date     Anemia      B12 deficiency anemia            DALIA Xiong MD

## 2021-06-13 NOTE — TELEPHONE ENCOUNTER
Pt has returned to work but has persistent cough.  She has been tested for COVID19 three times in the last 3 weeks.    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    Pt reported that she has been taking over the counter cough syrup Dayquill and NightQuill for the past 10 days.

## 2021-06-13 NOTE — TELEPHONE ENCOUNTER
Patient was given message below from Dr. Xiong.  Annie GÓMEZ, ЕЛЕНА/CMT....................2:13 PM

## 2021-06-13 NOTE — PROGRESS NOTES
"Arrived for transfusion.  See ED notes from yesterday as well as my phone note from earlier today.  Pt states she had to  her daughter unexpectedly from school and so was running late.  Is understanding of the fact that she has arrived too late today for 2 units.  States she has to work tomorrow and is unsure if she can get here for 2nd unit tomorrow.  Told pt it really is very important for her to receive both units of blood.  She is checking with her supervisor  and will let me know by the end of her visit today.  Crossmatch drawn on arrival here and given lunch as she hasn't eaten yet today.  Notes that she has had headache for \"2 weeks.  But I'm sure once the transfusion has started, it will get better\".  Given 1 unit PRBC's without problem.  VSS.  No evidence of reaction while here.  Reviewed AVS with pt and she verbalizes understanding of info given.  States she has gone thru 4 peripads today and no trips to [place] while here.  Feels vag bleeding is still present but much less. Note she also states her headache has completely resolved on discharge.    Reminded of importance of filling her script from ED and to return for 2nd unit tomorrow.  She assures me she will do so.  Left via w/c accompanied by transport and daughter.    "

## 2021-06-13 NOTE — TELEPHONE ENCOUNTER
I sent a codeine based cough syrup to the [facility] pharmacy . If she can see me briefly today I between patients , I can listen to her lungs and also talk about her results

## 2021-06-13 NOTE — PROGRESS NOTES
"Bonnie Webster is a 40 y.o. female who is being evaluated via a billable telephone visit.      The patient has been notified of following:     \"This telephone visit will be conducted via a call between you and your physician/provider. We have found that certain health care needs can be provided without the need for a physical exam.  This service lets us provide the care you need with a short phone conversation.  If a prescription is necessary we can send it directly to your pharmacy.  If lab work is needed we can place an order for that and you can then stop by our lab to have the test done at a later time.    Telephone visits are billed at different rates depending on your insurance coverage. During this emergency period, for some insurers they may be billed the same as an in-person visit.  Please reach out to your insurance provider with any questions.    If during the course of the call the physician/provider feels a telephone visit is not appropriate, you will not be charged for this service.\"    Patient has given verbal consent to a Telephone visit? Yes    What phone number would you like to be contacted at? [phone]    Patient would like to receive their AVS by AVS Preference: Mail a copy.    Additional provider notes: patient had a h/o thyroiditis with low TSH , neck pain . RI scan showed decreased uptake . tpo positive  But also has a large nodule with atypical cells on biopsy . TSH normalized . Did see endocrinologist who agreed with plan and recommended partial thyroidectomy and she is due to see thyroid surgeon in January . She feels well except for ongoing neck pain where nodule is . She has no symptoms of hyper thyroidism   She also had a long standing h/o severe iron def anemia and needed iron transfusion . Last hemogram has normalized     Assessment/Plan:  1. Health maintenance examination  Will review at her next visit . She has never come in for a full physical as she had no insurance     2. " Thyroid nodule  Awaiting surgical input. Large size . Atypical cells , genetic testing high risk . TSH is normal . She may need thyroxine in the future   3. Iron malabsorption  Intermittent iron infusion therapy Rechecked and verified at next visit         Phone call duration:  10 minutes    Sandy Muniz CMA

## 2021-06-13 NOTE — PROGRESS NOTES
Office Visit - Follow Up   Bonnie Webster   40 y.o. female    Date of Visit: 11/16/2020         Assessment and Plan   1. Thyroid nodule  Bonnie  had neck pain with an enlarging thyroid nodule palpable.  She had a extensive work-up so far for this.  Ultrasound did show a multinodular goiter with an hypervascular thyroid parenchyma typically seen in thyroiditis.  Her her TPO antibody was positive the TSH receptor antibody was negative.  The nuclear medicine reuptake scan was negative for increased uptake.  The right nodule: 5.4 x 2.1 x 1.8 cm met criteria for biopsy biopsy was done under ultrasound guidance.  This showed atypical cells.  It was sent for affirm testing which came back a few weeks later showing genetic testing to show 50% predicted malignancy chance.  This was all explained to patient.  Meanwhile her TSH had been over suppressed and now with normal T3-T4 and now the TSH normalized on its own.  She still has pain in and not in the where the nodule is and difficulty in swallowing.  I do think she needs a partial thyroidectomy given the findings referral to endocrinology was made.  As opposed to surveillance  - Ambulatory referral to Endocrinology    2. Cough  She does have cough she says throughout the winter she gets this cough she has been tested for Covid multiple times in the it has been negative we will empirically treat her with azithromycin and codeine cough syrup and give her rest at home.  - azithromycin (ZITHROMAX) 250 MG tablet; Take 2 tablets by mouth day one and 1 tablet by mouth days 2-5  Dispense: 6 tablet; Refill: 0    3. Thyroiditis      4. Iron deficiency anemia due to chronic blood loss  Her last hemogram normalized with iron tablets            No follow-ups on file.     History of Present Illness   This 40 y.o. old   Chief Complaint   Patient presents with     Test Results     Go over test results     Cough     Has bad cough that just wont go away, started ~3 weeks ago, had neg Covid  "test   She has no significant fever this she did have a fever a week ago.  The cough is dry she has no chest pain or shortness of breath no diarrhea joint pains or skin    Review of Systems: A comprehensive review of systems was negative except as noted.     Medications, Allergies and Problem List   Reviewed, reconciled and updated  Patient Active Problem List   Diagnosis     Pernicious anemia     Tobacco abuse     Iron malabsorption     Iron deficiency anemia due to chronic blood loss     Symptomatic anemia     Calculus of gallbladder     Severe anemia     Adverse effect of other drugs, medicaments and biological substances, initial encounter     Adjustment disorder with mixed anxiety and depressed mood     Hypokalemia     Intractable vomiting with nausea     Hyperthyroidism     Cannabis abuse, daily use     Exophthalmos of both eyes     Thyroiditis     Thyroid nodule        Physical Exam   General Appearance:       BP 98/54 (Patient Site: Right Arm, Patient Position: Sitting, Cuff Size: Adult Large)   Pulse 75   Temp 96.7  F (35.9  C) (Tympanic)   Ht 5' 8\" (1.727 m)   Wt 134 lb (60.8 kg)   SpO2 100%   BMI 20.37 kg/m      General appearance - alert, well appearing, and in no distress  Mental status - alert, oriented to person, place, and time  Neck - supple, no significant adenopathy she does have a mildly enlarged right thyroid lobe.  Lymphatics - no palpable lymphadenopathy, no hepatosplenomegaly  Chest - clear to auscultation, no wheezes, rales or rhonchi, symmetric air entry  Heart - normal rate, regular rhythm, normal S1, S2, no murmurs, rubs, clicks or gallops  Throat is clear                                                                                       Additional Information   Current Outpatient Medications   Medication Sig Dispense Refill     clobetasoL (TEMOVATE) 0.05 % ointment aplly thin layer on skin twice a day 30 g 0     codeine-guaiFENesin (GUAIFENESIN AC)  mg/5 mL liquid Take 5 " mL by mouth 3 (three) times a day as needed. 120 mL 0     cyanocobalamin 1,000 mcg/mL injection Inject 1 mL (1,000 mcg total) into the shoulder, thigh, or buttocks every 28 days. 1 mL 0     ibuprofen (ADVIL,MOTRIN) 800 MG tablet Take 1 tablet (800 mg total) by mouth every 8 (eight) hours as needed for pain. 30 tablet 2     levonorgestrel-ethinyl estradiol (NORDETTE) 0.15-0.03 mg per tablet Take 1 tablet by mouth daily. 3 Package 3     LORazepam (ATIVAN) 0.5 MG tablet 1-2 tablets at bedtime once daily 30 tablet 1     ondansetron (ZOFRAN) 4 MG tablet Take 1 tablet (4 mg total) by mouth every 8 (eight) hours as needed. 30 tablet 1     propranoloL (INDERAL) 20 MG tablet Take 0.5 tablets (10 mg total) by mouth 2 (two) times a day. 90 tablet 3     azithromycin (ZITHROMAX) 250 MG tablet Take 2 tablets by mouth day one and 1 tablet by mouth days 2-5 6 tablet 0     No current facility-administered medications for this visit.      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 A delay in compatible RBCs may occur.     Latex Swelling     Social History     Social History Narrative    Lives with her son, Sincere (2000) and daughter Semaj (2005).  Works for [employer], reception.      reports that she has been smoking. She has been smoking about 0.10 packs per day. She has never used smokeless tobacco. She reports that she does not drink alcohol or use drugs.   Past Medical History:   Diagnosis Date     Anemia      B12 deficiency anemia            Time:      Rolanda Xiong MD

## 2021-06-13 NOTE — PROGRESS NOTES
"Arrived for second unit of blood that was ordered in ED on 9/27.  States she worked this am and \"did feel a little stronger today\".  Has been taking her progesterone and \"I'm just spotting now, so it's much better\".  Hasn't made appt with OB/GYN but I plan to do that on Monday when I'm off.  Stressed importance of following up with primary MD and OB/GYN.  Arrived with mild headache but by discharge headache had subsided.  Ate lunch while here as well.  Given transfusion of 1 unit PRBC's without problem.  VSS.  No evidence of reaction while here.  Left ambulatory by self at 1550.    Has printed info on transfusion from yesterday's visit.    "

## 2021-06-14 NOTE — PROGRESS NOTES
Patient comes in today for a b12 injection. Given without incident. See MAR for details. Tequila Woodruff LPN

## 2021-06-14 NOTE — PROGRESS NOTES
Patient comes in today for a b12 injection. Given to the right deltoid without incident. See MAR for details. Tequila Woodruff LPN

## 2021-06-14 NOTE — PROGRESS NOTES
Office Visit - New Patient   Bonnie Webster   37 y.o.  female    Date of visit: 2017  Physician: Samson Ingram MD     Assessment and Plan   1. Painful urination  Consistent with cystitis, treated with nitrofurantoin  - Urinalysis-UC if Indicated  - Culture, Urine    2. Pernicious anemia  I suspect her most current issue is iron deficiency anemia.  We will check her iron stores and if low we will arrange for her to get an iron infusion.  We will restart vitamin B12 and injections as well.  She can get these in the clinic now that she works here.  Screening for celiac disease  - HM2(CBC w/o Differential)  - Thyroid Cascade  - Vitamin B12  - Ferritin  - Reticulocytes    3. Microcytic anemia  - Tissue Transglutaminase,IgA & IgG    4. Heavy menses  - Ambulatory referral to Obstetrics / Gynecology    5. Tobacco abuse  Cessation advised, information from [place] quit plan given.  She has not tolerated Chantix in the past.  I recommended nicotine replacement    Return in about 4 weeks (around 2018) for recheck.     Chief Complaint   Chief Complaint   Patient presents with     Urinary Tract Infection     Cough        Patient Profile   Social History     Social History Narrative    Lives with her son, Sincere () and daughter Semaj ().  Works for [employer], reception.        Past Medical History   Patient Active Problem List   Diagnosis     Pernicious anemia     Tobacco abuse       Past Surgical History  She has a past surgical history that includes Bone marrow biopsy; ORIF metacarpal fracture (Left, 2013); and  section.     History of Present Illness   This 37 y.o. old woman comes in to establish care and for evaluation of painful urination.  Her medical history was reviewed, electronic medical record was updated and it is reflected in this note.  She has a significant history of pernicious anemia.  She presented with pancytopenia.  She had a bone marrow biopsy which I  reviewed and which is consistent with vitamin B-12 deficiency.  She was repleted and had a nice response in her hemoglobin.  She is also iron deficient but has never regularly taking iron.  She also has heavy menses.  She was seen in March and had a hemoglobin of 5.  She was given blood transfusion.  She does have positive antibodies.  She has had multiple blood transfusions in the past.  She had a hemoglobin electrophoresis which shows no hemoglobinopathy.  She has a microcytic anemia.  She has never had an iron infusion.  She does not tolerate oral iron.  She does not tolerate OCPs to manage her menorrhagia.  She also does not tolerate an IUD in the past.  Today she has noticed burning with urination and increased frequency.  This came on just today and she has no abdominal pain nausea vomiting back pain fever chills.    Review of Systems: A comprehensive review of systems was negative except as noted.     Medications and Allergies   Current Outpatient Prescriptions   Medication Sig Dispense Refill     nitrofurantoin, macrocrystal-monohydrate, (MACROBID) 100 MG capsule Take 1 capsule (100 mg total) by mouth 2 (two) times a day for 5 days. 10 capsule 0     Current Facility-Administered Medications   Medication Dose Route Frequency Provider Last Rate Last Dose     cyanocobalamin injection 1,000 mcg  1,000 mcg Intramuscular Q30 Days Samson Ingram MD   1,000 mcg at 12/07/17 1039     Allergies   Allergen Reactions     Blood-Group Specific Substance Other (See Comments)     Non specific antibody present; patient has a Fyb IAN mutation and a variant C antigen see miscellaneous report for genotyping common panel done 09/27/2017. A delay in compatible RBCs may occur.     Latex Swelling        Family and Social History   Family History   Problem Relation Age of Onset     No Medical Problems Mother      No Medical Problems Father      unknown     No Medical Problems Sister      No Medical Problems Brother      No  "Medical Problems Son      No Medical Problems Daughter         Social History   Substance Use Topics     Smoking status: Current Every Day Smoker     Packs/day: 0.10     Smokeless tobacco: Never Used     Alcohol use No        Physical Exam   General Appearance:   No acute distress    /74 (Patient Site: Left Arm, Patient Position: Sitting, Cuff Size: Adult Regular)  Pulse 63  Ht 5' 8\" (1.727 m)  Wt 135 lb (61.2 kg)  SpO2 100%  BMI 20.53 kg/m2    EYES: Eyelids, conjunctiva, and sclera were normal. Pupils were normal. Cornea, iris, and lens were normal bilaterally.  HEAD, EARS, NOSE, MOUTH, AND THROAT: Head and face were normal. Hearing was normal to voice and the ears were normal to external exam. Nose appearance was normal and there was no discharge. Oropharynx was normal.  NECK: Neck appearance was normal. There were no neck masses and the thyroid was not enlarged.  RESPIRATORY: Breathing pattern was normal and the chest moved symmetrically.  Percussion/auscultatory percussion was normal.  Lung sounds were normal and there were no abnormal sounds.  CARDIOVASCULAR: Heart rate and rhythm were normal.  S1 and S2 were normal and there were no extra sounds or murmurs. Peripheral pulses in arms and legs were normal.  Jugular venous pressure was normal.  There was no peripheral edema.  GASTROINTESTINAL: The abdomen was normal in contour.  Bowel sounds were present.  Percussion detected no organ enlargement or tenderness.  Palpation detected no tenderness, mass, or enlarged organs.   MUSCULOSKELETAL: Skeletal configuration was normal and muscle mass was normal for age. Joint appearance was overall normal.  LYMPHATIC: There were no enlarged nodes.  SKIN/HAIR/NAILS: Skin color was normal.  There were no skin lesions.  Hair and nails were normal.  NEUROLOGIC: The patient was alert and oriented to person, place, time, and circumstance. Speech was normal. Cranial nerves were normal. Motor strength was normal for age. " The patient was normally coordinated.  PSYCHIATRIC:  Mood and affect were normal and the patient had normal recent and remote memory. The patient's judgment and insight were normal.       Additional Information   Review and/or order of clinical lab tests: Reviewed  Review and/or order of radiology tests: Reviewed  Review and/or order of medicine tests: Reviewed  Discussion of test results with performing physician:  Decision to obtain old records and/or obtain history from someone other than the patient: Care everywhere was accessed  Review and summarization of old records and/or obtaining history from someone other than the patient and.or discussion of case with another health care provider: I reviewed outside records from [facility], reviewed hematology consultation and I reviewed her admission to [facility], history and physical and discharge summary  Independent visualization of image, tracing or specimen itself:    Time:      Samson Ingram MD  Internal Medicine  Contact me at [phone]

## 2021-06-14 NOTE — PROGRESS NOTES
[facility]  [address] [address]  Dept: [phone]  Dept Fax: [phone]  Primary Provider: Aj Landers MD  Pre-op Performing Provider: AJ LANDERS    PREOPERATIVE EVALUATION:  Today's date: 1/7/2021    Bonnie Webster is a 40 y.o. female who presents for a preoperative evaluation.    Surgical Information:  Surgery/Procedure: Total thyroidectomy  Surgery Location:    Surgeon:   Surgery Date: 1/18  Time of Surgery:   Where patient plans to recover: At home with family  Fax number for surgical facility: Note does not need to be faxed, will be available electronically in Epic.    Type of Anesthesia Anticipated: General    Subjective     HPI related to upcoming procedure: Patient has an enlarged goiter with enlarging nodule that has atypical cells.  She also has a history of chronic committed Graves' and Hashimoto's.  The goiter is causing physical symptoms.      Health Care Directive:  Patient does not have a Health Care Directive or Living Will:     Preoperative Review of :    reviewed - no record of controlled substances prescribed.    See problem list for active medical problems.  Problems all longstanding and stable, except as noted/documented.  See ROS for pertinent symptoms related to these conditions.      Review of Systems  Constitutional, neuro, ENT, endocrine, pulmonary, cardiac, gastrointestinal, genitourinary, musculoskeletal, integument and psychiatric systems are negative, except as otherwise noted.      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     Thyroiditis 06/19/2020     Thyroid nodule 06/19/2020     Hypokalemia 06/18/2020     Intractable vomiting with nausea 06/18/2020     Adjustment  disorder with mixed anxiety and depressed mood 2019     Adverse effect of other drugs, medicaments and biological substances, initial encounter 03/15/2019     Symptomatic anemia 2018     Calculus of gallbladder 2018     Severe anemia 2018     Iron malabsorption 2018     Iron deficiency anemia due to chronic blood loss 2018     Pernicious anemia 2016     Tobacco abuse 2016     Past Medical History:   Diagnosis Date     Anemia      B12 deficiency anemia      Past Surgical History:   Procedure Laterality Date     BONE MARROW BIOPSY        SECTION      x2     ORIF METACARPAL FRACTURE Left      US THYROID BIOPSY  10/21/2020     Current Outpatient Medications   Medication Sig Dispense Refill     clobetasoL (TEMOVATE) 0.05 % ointment aplly thin layer on skin twice a day 30 g 0     cyanocobalamin 1,000 mcg/mL injection Inject 1 mL (1,000 mcg total) into the shoulder, thigh, or buttocks every 28 days. 1 mL 0     ibuprofen (ADVIL,MOTRIN) 800 MG tablet Take 1 tablet (800 mg total) by mouth every 8 (eight) hours as needed for pain. 30 tablet 2     levonorgestrel-ethinyl estradiol (NORDETTE) 0.15-0.03 mg per tablet Take 1 tablet by mouth daily. 3 Package 3     LORazepam (ATIVAN) 0.5 MG tablet 1-2 tablets at bedtime once daily 30 tablet 1     nicotine (NICODERM CQ) 14 mg/24 hr Place 1 patch on the skin daily. 30 patch 0     ondansetron (ZOFRAN) 4 MG tablet Take 1 tablet (4 mg total) by mouth every 8 (eight) hours as needed. 30 tablet 1     propranoloL (INDERAL) 20 MG tablet Take 0.5 tablets (10 mg total) by mouth 2 (two) times a day. 90 tablet 3     No current facility-administered medications for this visit.        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 A delay in compatible RBCs may occur.     Latex Swelling       Social History     Tobacco Use     Smoking status: Current Every Day Smoker     Packs/day: 0.10     Smokeless tobacco: Never Used     Tobacco comment: 4-5 daily   Substance Use Topics     Alcohol use: No        Social History     Substance and Sexual Activity   Drug Use No        Objective     /58 (Patient Site: Left Arm, Patient Position: Sitting, Cuff Size: Adult Large)   Pulse 62   Temp 97.9  F (36.6  C) (Tympanic)   Ht 5' 8\" (1.727 m)   Wt 138 lb (62.6 kg)   SpO2 98%   BMI 20.98 kg/m    Physical Exam    GENERAL APPEARANCE: healthy, alert and no distress     EYES: EOMI, PERRL     HENT: ear canals and TM's normal and nose and mouth without ulcers or lesions     NECK: no adenopathy, no asymmetry, masses, or scars and thyroid normal to palpation     RESP: lungs clear to auscultation - no rales, rhonchi or wheezes     BREAST: normal without masses, tenderness or nipple discharge and no palpable axillary masses or adenopathy     CV: regular rates and rhythm, normal S1 S2, no S3 or S4 and no murmur, click or rub     ABDOMEN:  soft, nontender, no HSM or masses and bowel sounds normal     MS: extremities normal- no gross deformities noted, no evidence of inflammation in joints, FROM in all extremities.     SKIN: no suspicious lesions or rashes     NEURO: Normal strength and tone, sensory exam grossly normal, mentation intact and speech normal     PSYCH: mentation appears normal. and affect normal/bright     LYMPHATICS: No cervical adenopathy    Recent Labs   Lab Test 01/07/21  0942 10/05/20  1437 06/29/20  1148   HGB 11.6* 11.6* 8.5*    226 260   NA  --  141 141   K  --  4.0 4.5   CREATININE  --  0.71 0.68        PRE-OP Diagnostics:   Labs pending at this time. Results will be reviewed when available.  No EKG this visit, completed in the last 90 days.    REVISED " CARDIAC RISK INDEX (RCRI)   The patient has the following serious cardiovascular risks for perioperative complications:   - No serious cardiac risks = 0 points    RCRI INTERPRETATION: 0 points: Class I (very low risk - 0.4% complication rate)         Assessment & Plan      The proposed surgical procedure is considered LOW risk.    Encounter for tobacco use cessation counseling  Was urged that she get stop smoking to facilitate her proper recovery and surgery.  She was given a patch prescription she also has some hemoptysis I am not concerned about but I think it is traction hemoptysis and chest x-ray was done which was reviewed and is normal.  Hemoptysis persists she is to contact.  - nicotine (NICODERM CQ) 14 mg/24 hr; Place 1 patch on the skin daily.    Preop general physical exam  She has not had any serious complications any anesthesia before she is at the site bleeding risk because of her anemia we will check that but I do not anticipate any new iron infusion to be done she will not refuse transfusions.  She has no history of clotting disorders.  - Thyroid Stimulating Hormone (TSH)  - HM1(CBC and Differential)  - Iron and Transferrin Iron Binding Capacity  - Basic Metabolic Panel  -        RECOMMENDATION:  APPROVAL GIVEN to proceed with proposed procedure, without further diagnostic evaluation.    Signed Electronically by: Rolanda Xiong MD    Copy of this evaluation report is provided to requesting physician.    [facility] Preop Guidelines    Revised Cardiac Risk Index

## 2021-06-14 NOTE — PROGRESS NOTES
Bonnie is here today for her B12 injection. She is within the expected time frame. Injection given without incident. See MAR for administration details.   Christie Regalado CSS

## 2021-06-14 NOTE — PROGRESS NOTES
Patient comes in today for a B12 injections. Given without incident. See MAR for details. Tequila Woodruff LPN

## 2021-06-15 NOTE — PROGRESS NOTES
Bonnie Webster is a 40 y.o. female who is being evaluated via a billable telephone visit.      What phone number would you like to be contacted at? [phone]   How would you like to obtain your AVS? AVS Preference: Mail a copy.        Subjective   Bonnie Webster is 40 y.o. and presents today for the following health issues   HPI     Chief Complaint   Patient presents with     Follow-up     Telephone CALL: [phone] - discuss B12    had a total thyroidectomy done and still has some pain on swallowing . No choking . Voice is not too hoarse . Coughs occasionally and is feeling very tired . Was started on 100mcg thyroxine ( pre  surgery tsh was normal ) post surgery PTH was normal , calcium is low and she is on supplements .          Review of Systems        Objective       Vitals:  No vitals were obtained today due to virtual visit.    Physical Exam      Assess/plan  1. H/O total thyroidectomy  One time refill on hydrocodone for pain control / she needs follow up calcium and hemogram . Has a h/o iron def anemia and TSH   - HYDROcodone-acetaminophen 5-325 mg per tablet; Take 1 tablet by mouth every 4 (four) hours as needed for pain.  Dispense: 18 tablet; Refill: 0  - Basic Metabolic Panel; Future  - HM1(CBC and Differential); Future  - Thyroid Stimulating Hormone (TSH); Future  - Iron and Transferrin Iron Binding Capacity; Future    2. Encounter for tobacco use cessation counseling  congratualted her on quitting   - nicotine (NICODERM CQ) 21 mg/24 hr; Place 1 patch on the skin daily.  Dispense: 30 patch; Refill: 1    3. Anemia, unspecified type  Did have b12 deficinecy will check levels   - Methylmalonic Acid,Serum or Plasma (Vitamin B12 Status); Future            Phone call duration: 12 minutes

## 2021-06-15 NOTE — PROGRESS NOTES
OFFICE VISIT - FAMILY MEDICINE     ASSESSMENT AND PLAN     1. Generalized edema  HM2(CBC w/o Differential)    Comprehensive Metabolic Panel    Thyroid Cascade    T4, Free    predniSONE (DELTASONE) 20 MG tablet    levothyroxine (SYNTHROID, LEVOTHROID) 137 MCG tablet   2. Other iron deficiency anemia  Iron and Transferrin Iron Binding Capacity    Ferritin   TSH came back significantly elevated at 70, we discussed management increase levothyroxine to 137 mcg with plan to provide increase further based on results in about 4 to 5 weeks, she has mild iron deficiency anemia, we discussed eating food rich in iron and iron therapy.  discharge prednisone.    CHIEF COMPLAINT   Cough (for 6 days) and Edema (face and legs for 6 days )    HPI   Bonnie Webster is a 40 y.o. female.  No Patient Care Coordination Note on file.     Total thyroidectomy was done on January 18, 2021, is currently taking 100 MCG of levothyroxine every day.  For the past few days she been noticing a progressive swelling in her face, lower extremities.  Vision seems to be affected with blurry vision, having a slight cough, not sure if she was exposed to any allergen.  She also mention a history of iron deficiency anemia.  Not currently taking any iron therapy.      Review of Systems As per HPI, otherwise negative.    OBJECTIVE   /77 (Patient Site: Left Arm, Patient Position: Sitting, Cuff Size: Adult Regular)   Temp 97.8  F (36.6  C) (Temporal)   Wt 143 lb (64.9 kg)   LMP 02/14/2021   BMI 21.74 kg/m    Physical Exam   Constitutional: She is oriented to person, place, and time. She appears well-developed and well-nourished.   HENT:   Head: Normocephalic and atraumatic.   Neck: Normal range of motion. Neck supple. No JVD present. No tracheal deviation present. No thyromegaly present.   Cardiovascular: Normal rate, regular rhythm, normal heart sounds and intact distal pulses. Exam reveals no gallop and no friction rub.   No murmur  heard.  Pulmonary/Chest: Effort normal and breath sounds normal. No respiratory distress. She has no wheezes. She has no rales.   Musculoskeletal:         General: Edema (non pitting) present. No tenderness.   Lymphadenopathy:     She has no cervical adenopathy.   Neurological: She is alert and oriented to person, place, and time. Coordination normal.   Psychiatric: She has a normal mood and affect. Judgment and thought content normal.       PFSH     Family History   Problem Relation Age of Onset     No Medical Problems Mother      No Medical Problems Father         unknown     No Medical Problems Sister      No Medical Problems Brother      No Medical Problems Son      No Medical Problems Daughter      Social History     Socioeconomic History     Marital status: Single     Spouse name: Not on file     Number of children: Not on file     Years of education: Not on file     Highest education level: Not on file   Occupational History     Not on file   Social Needs     Financial resource strain: Not on file     Food insecurity     Worry: Not on file     Inability: Not on file     Transportation needs     Medical: Not on file     Non-medical: Not on file   Tobacco Use     Smoking status: Former Smoker     Packs/day: 0.10     Quit date: 2021     Years since quittin.1     Smokeless tobacco: Never Used     Tobacco comment: 4-5 daily   Substance and Sexual Activity     Alcohol use: No     Drug use: No     Sexual activity: Never   Lifestyle     Physical activity     Days per week: Not on file     Minutes per session: Not on file     Stress: Not on file   Relationships     Social connections     Talks on phone: Not on file     Gets together: Not on file     Attends Orthodoxy service: Not on file     Active member of club or organization: Not on file     Attends meetings of clubs or organizations: Not on file     Relationship status: Not on file     Intimate partner violence     Fear of current or ex partner: Not on  file     Emotionally abused: Not on file     Physically abused: Not on file     Forced sexual activity: Not on file   Other Topics Concern     Not on file   Social History Narrative    Lives with her son, Sincere () and daughter Semaj ().  Works for [employer], reception.     Relevant history was reviewed with the patient today, unless noted in HPI, nothing is pertinent for this visit.  [facility]     Patient Active Problem List    Diagnosis Date Noted     Graves disease 2020     Overview Note:     Added automatically from request for surgery [id number]       Hashimoto's disease 2020     Overview Note:     Added automatically from request for surgery [id number]       Health maintenance examination 2020     Overview Note:     mammo  None    pap unknown        Hyperthyroidism 2020     Cannabis abuse, daily use 2020     Exophthalmos of both eyes 2020     Thyroiditis 2020     Thyroid nodule 2020     Hypokalemia 2020     Intractable vomiting with nausea 2020     Adjustment disorder with mixed anxiety and depressed mood 2019     Adverse effect of other drugs, medicaments and biological substances, initial encounter 03/15/2019     Symptomatic anemia 2018     Calculus of gallbladder 2018     Severe anemia 2018     Iron malabsorption 2018     Iron deficiency anemia due to chronic blood loss 2018     Pernicious anemia 2016     Tobacco abuse 2016     Past Surgical History:   Procedure Laterality Date     BONE MARROW BIOPSY        SECTION      x2     ORIF METACARPAL FRACTURE Left      US THYROID BIOPSY  10/21/2020       RESULTS/CONSULTS (Lab/Rad)     Recent Results (from the past 168 hour(s))   HM2(CBC w/o Differential)   Result Value Ref Range    WBC 3.6 (L) 4.0 - 11.0 thou/uL    RBC 3.31 (L) 3.80 - 5.40 mill/uL    Hemoglobin 9.6 (L) 12.0 - 16.0 g/dL    Hematocrit 30.3 (L) 35.0 - 47.0 %    MCV 92  80 - 100 fL    MCH 29.0 27.0 - 34.0 pg    MCHC 31.7 (L) 32.0 - 36.0 g/dL    RDW 14.2 11.0 - 14.5 %    Platelets 254 140 - 440 thou/uL    MPV 9.8 7.0 - 10.0 fL   Comprehensive Metabolic Panel   Result Value Ref Range    Sodium 138 136 - 145 mmol/L    Potassium 4.2 3.5 - 5.0 mmol/L    Chloride 105 98 - 107 mmol/L    CO2 26 22 - 31 mmol/L    Anion Gap, Calculation 7 5 - 18 mmol/L    Glucose 86 70 - 125 mg/dL    BUN 12 8 - 22 mg/dL    Creatinine 0.80 0.60 - 1.10 mg/dL    GFR MDRD Af Amer >60 >60 mL/min/1.73m2    GFR MDRD Non Af Amer >60 >60 mL/min/1.73m2    Bilirubin, Total 0.3 0.0 - 1.0 mg/dL    Calcium 8.8 8.5 - 10.5 mg/dL    Protein, Total 7.1 6.0 - 8.0 g/dL    Albumin 4.3 3.5 - 5.0 g/dL    Alkaline Phosphatase 38 (L) 45 - 120 U/L    AST 19 0 - 40 U/L    ALT 10 0 - 45 U/L   Thyroid Cascade   Result Value Ref Range    TSH 69.59 (H) 0.30 - 5.00 uIU/mL   T4, Free   Result Value Ref Range    Free T4 0.5 (L) 0.7 - 1.8 ng/dL   Iron and Transferrin Iron Binding Capacity   Result Value Ref Range    Iron 28 (L) 42 - 175 ug/dL    Transferrin 356 212 - 360 mg/dL    Transferrin Saturation, Calculated 6 (L) 20 - 50 %    Transferrin IBC, Calculated 445 313 - 563 ug/dL   Ferritin   Result Value Ref Range    Ferritin 7 (L) 10 - 130 ng/mL     No results found.  MEDICATIONS     Current Outpatient Medications on File Prior to Visit   Medication Sig Dispense Refill     nicotine (NICODERM CQ) 21 mg/24 hr Place 1 patch on the skin daily. 30 patch 1     [DISCONTINUED] levothyroxine (SYNTHROID, LEVOTHROID) 100 MCG tablet Take 100 mcg by mouth daily.       clobetasoL (TEMOVATE) 0.05 % ointment aplly thin layer on skin twice a day 30 g 0     cyanocobalamin 1,000 mcg/mL injection Inject 1 mL (1,000 mcg total) into the shoulder, thigh, or buttocks every 28 days. 1 mL 0     ibuprofen (ADVIL,MOTRIN) 800 MG tablet Take 1 tablet (800 mg total) by mouth every 8 (eight) hours as needed for pain. 30 tablet 2     levonorgestrel-ethinyl estradiol  (NORDETTE) 0.15-0.03 mg per tablet Take 1 tablet by mouth daily. 3 Package 3     LORazepam (ATIVAN) 0.5 MG tablet 1-2 tablets at bedtime once daily 30 tablet 1     ondansetron (ZOFRAN) 4 MG tablet Take 1 tablet (4 mg total) by mouth every 8 (eight) hours as needed. 30 tablet 1     propranoloL (INDERAL) 20 MG tablet Take 0.5 tablets (10 mg total) by mouth 2 (two) times a day. 90 tablet 3     No current facility-administered medications on file prior to visit.        HEALTH MAINTENANCE / SCREENING   PHQ-2 Total Score: 3 (1/7/2021  8:36 AM)  , PHQ-9 Total Score: 11 (1/7/2021  8:36 AM)  ,No data recorded  Immunization History   Administered Date(s) Administered     MMR 11/16/1993     Health Maintenance   Topic     HEPATITIS C SCREENING      PREVENTIVE CARE VISIT      HIV SCREENING      PAP SMEAR      TD 18+ HE      ADVANCE CARE PLANNING      DEPRESSION ACTION PLAN      TDAP ADULT ONE TIME DOSE      Pneumococcal Vaccine: Pediatrics (0 to 5 Years) and At-Risk Patients (6 to 64 Years)      HEPATITIS B VACCINES      Review of external notes as documented above     26 minutes spent on the date of the encounter doing chart review, review of outside records, review of test results, interpretation of tests, patient visit and documentation     Lorrie Enriquez MD  [facility]     This note was dictated using a voice recognition software.  Any grammatical or context distortion are unintentional and inherent to the software.

## 2021-06-15 NOTE — PROGRESS NOTES
"  Office Visit - Follow Up   Bonnie Webster   37 y.o. female    Date of Visit: 1/16/2018    Chief Complaint   Patient presents with     Ear Pain     left        Assessment and Plan   1. URI (upper respiratory infection)  Discussed that this is probably viral but given duration of symptoms and lack of improvement will try azithromycin.  Okay for ibuprofen.  Trial of Sudafed to help with eustachian tube dysfunction    2. Pernicious anemia  Continue with B12 injections and iron therapy, recheck labs in 6 weeks    3. Tobacco abuse  She has quit smoking which is excellent    Return in about 6 weeks (around 2/27/2018) for recheck.     History of Present Illness   This 37 y.o. old woman comes in with over 2 weeks of left ear pain, sinus congestion, drainage, cough.  No fevers no body aches.  Daughter who is on vaccinated for influenza recently diagnosed with influenza.  The patient has received a vaccine.  She has tried ibuprofen which does help with the pain.  No breathing difficulty.  No nausea vomiting or diarrhea.  No rash.  No hot or swollen joints.  She is taking B12 injections.  Started iron therapy yesterday.    Review of Systems: A comprehensive review of systems was negative except as noted.     Medications, Allergies and Problem List   Reviewed and updated     Physical Exam   General Appearance:   No acute distress    /62 (Patient Site: Left Arm, Patient Position: Sitting, Cuff Size: Adult Regular)  Pulse 63  Ht 5' 8\" (1.727 m)  Wt 135 lb (61.2 kg)  SpO2 99%  BMI 20.53 kg/m2    HEENT exam is unremarkable  Neck supple no thyromegaly or nodule palpable  Lymphatic no cervical lymphadenopathy  Cardiovascular regular rate and rhythm no murmur gallop or rub  Pulmonary lungs are clear to auscultation bilaterally  Gastrointestinall abdomen soft nontender nondistended no organomegaly  Neurologic exam is non focal  Psychiatric pleasant, no confusion or agitation        Additional Information   Current " Outpatient Prescriptions   Medication Sig Dispense Refill     ferrous sulfate 325 (65 FE) MG tablet Take 1 tablet (325 mg total) by mouth 3 (three) times a day with meals. 90 tablet 3     azithromycin (ZITHROMAX) 250 MG tablet Take 2 tablets by mouth day one and 1 tablet by mouth days 2-5 6 tablet 0     ibuprofen (ADVIL,MOTRIN) 600 MG tablet Take 1 tablet (600 mg total) by mouth every 6 (six) hours as needed for pain. 60 tablet 2     pseudoephedrine (SUDAFED) 30 MG tablet Take 1 tablet (30 mg total) by mouth every 6 (six) hours as needed for congestion. 24 tablet 2     Current Facility-Administered Medications   Medication Dose Route Frequency Provider Last Rate Last Dose     cyanocobalamin injection 1,000 mcg  1,000 mcg Intramuscular Q30 Days Samson Ingram MD   1,000 mcg at 01/12/18 1316     Allergies   Allergen Reactions     Blood-Group Specific Substance Other (See Comments)     Non specific antibody present; patient has a Fyb IAN mutation and a variant C antigen see miscellaneous report for genotyping common panel done 09/27/2017. A delay in compatible RBCs may occur.     Latex Swelling     Social History   Substance Use Topics     Smoking status: Current Every Day Smoker     Packs/day: 0.10     Smokeless tobacco: Never Used     Alcohol use No       Review and/or order of clinical lab tests:  Review and/or order of radiology tests:  Review and/or order of medicine tests:  Discussion of test results with performing physician:  Decision to obtain old records and/or obtain history from someone other than the patient:  Review and summarization of old records and/or obtaining history from someone other than the patient and.or discussion of case with another health care provider:  Independent visualization of image, tracing or specimen itself:    Time:      Samson Ingram MD

## 2021-06-17 NOTE — PROGRESS NOTES
Feels lousy  Headache dizziness  Long-standing history of documented iron deficiency and B12 deficiency  Reviewed regions  She has had positive antiparietal antibodies  She has never been tested for celiac disease  She has never had an upper GI endoscopy    She has had transfusions  She currently has headaches and some dizziness  I checked her blood pressure today 100/70.  Pulse regular    I reviewed her bone marrow 2016      Received: 8/13/2009   Reported: 8/14/2009   Physician(s): Kiara Pena                            Clinical History   28 year-old female with pancytopenia.     Final Pathologic Diagnosis   Bone marrow aspirate and trephine core biopsy:       - Markedly hypercellular bone marrow biopsy (% cellular)   with a pan hyperplasia and less than 1% blasts.       - Erythroid hyperplasia with dyserythropoiesis including   significant nuclear cytoplasmic dissociation (megaloblastic change).       - Giant metamyelocytes and neutrophil bands present.       - Occasional atypical megakaryocytes present.       - (See comment).   Plan iron transfusion  Weekly B12  See Dr. Samson Ingram in follow-up  Needs upper GI endoscopy and celiac study
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
## 2021-06-17 NOTE — PROGRESS NOTES
Bonnie A Webster arrived for a monthly IM cyanocobalamin injection. Treatment completedwithout signs of reaction.     Pt is missed her March injection and is feeling symptomatic of low cyanocobalamin levels; weak, headache, and dizzy upon standing.  Message sent to provider to order cyanocobalamin levels.      Shirley Cheney

## 2021-06-17 NOTE — PROGRESS NOTES
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 Samson Ingram in a week or so.    Sam Roth MD  Internal medicine  [facility]  [phone]  [email or web address]

## 2021-06-17 NOTE — PROGRESS NOTES
Iron deficiency anemia chronic  Inability to take oral iron with nausea and vomiting  Her iron that she does take is p inadequately being absorbed    She needs iron transfusion          Diagnosis 1 iron deficiency anemia  2.  Iron malabsorption not being absorbed in needed quantity

## 2021-06-18 NOTE — PATIENT INSTRUCTIONS - HE
Patient Instructions by Lorrie Enriquez MD at 3/1/2021 11:40 AM     Author: Lorrie Enriquez MD Service: -- Author Type: Physician    Filed: 3/2/2021  5:07 PM Encounter Date: 3/1/2021 Status: Signed    : Lorrie Enriquez MD (Physician)           Patient Education     Discharge Instructions for Hypothyroidism and Myxedema  Hypothyroidism means your thyroid gland is not producing enough thyroid hormone to meet your body's needs. Overall, hypothyroidism slows your bodys normal rate of functioning, causing mental and physical sluggishness. Various symptoms may range from mild to severe. The most severe form is called myxedema coma.  Medicine  Take your thyroid hormone medicine exactly as directed. You will take this medicine for the rest of your life.    Take your medicine the same time every day.    Keep your pills in a container that is labeled with the days of the week. This will help you remember if youve taken your medicine each day.    Take your medicine with a full glass of water. Take it at least 30 minutes to 1 hour before you eat breakfast. Or at bedtime, at least 3 hours after eating.    Do not take calcium or iron within 4 hours of taking your thyroid medicine. And, ask your healthcare provider about taking other medicines, vitamins, and herbal supplements with your thyroid pill.    Continue to take your medicine if you become pregnant. Many women need more thyroid medicine during pregnancy. Your doctor may increase your dose.    Your healthcare provider will regularly check your thyroid hormone levels with blood tests. If your dose is changed, you will usually have lab work in 4 to 6 weeks to be sure that the new dose is right for you.    Always alert your healthcare providers of changes in your other medicines (including estrogens, testosterone, and anti-seizure medicines) as these changes may affect your thyroid hormone levels.    Never stop treatment on your own. If you  do, your symptoms will return.  Other home care    During your routine visits, tell your healthcare provider about any signs of hyperthyroidism (too much thyroid hormone), such as:  ? Restlessness  ? Rapid weight loss  ? Sweating  ? Palpitations    Eat a high-fiber, low-calorie diet to relieve constipation and maintain a healthy weight.    Exercise. Start slow, with a 5 to 15 minute walk each day. The CDC recommends that adults with chronic conditions or disabilities, who are able, should do at least 150 minutes (2 hours and 30 minutes) to 300 minutes (5 hours) a week of moderate-intensity, or 75 minutes (1 hour and 15 minutes) to 150 minutes (2 hours and 30 minutes) a week of vigorous-intensity aerobic physical activity, or an equivalent combination of moderate- and vigorous-intensity aerobic activity. This exercise can be done in 10 minute periods throughout the day.    Remember, hypothyroidism is associated with increased cholesterol and an increased risk of heart disease. Correcting hypothyroidism generally improves cholesterol levels. Talk to your healthcare provider about the elements of a healthy lifestyle.    To learn more  The resources below can help you learn more:    American Thyroid Association 053-927-2146 www.thyroid.org    [facility] [facility] [phone] www.hormone.org  Follow-up care  Follow-up with your healthcare provider, or as advised.  When to seek medical care  Call your healthcare provider right away if you have any of the following:    Extreme fatigue    Puffy hands, face, or feet    Irregular heartbeat    Confusion    Rapid weight loss or weight gain  Date Last Reviewed: 12/1/2016 2000-2019 [employer]. [address] [place] [address], [place] [address]. All rights reserved. This information is not intended as a substitute for professional medical care. Always follow your healthcare professional's instructions.

## 2021-06-18 NOTE — PROGRESS NOTES
[facility] Follow Up Note    Bonnie Webster   37 y.o. female    Date of Visit: 6/14/2018    Chief Complaint   Patient presents with     Abdominal Pain     lower back, more right sided, started tuesday-after eating [place] [employer]- nausea      Subjective  This is a 37-year-old patient who has acute abdominal discomfort.  She began to experience symptoms 2 days ago after eating a [employer] sandwich.  Symptoms included some nausea and vomiting and subsequent to that some diarrhea.  She denies any fever or chills.  She has not been able to eat much.  Now, since last night she has developed some right lower quadrant abdominal pain that radiates around the flank and to the back.  The vomiting has stopped but she is still nauseated.  She denies any fever or chills.  Again, she has not eaten anything today.  No urinary symptoms.  No dysuria or frequency or hematuria.  She is concerned because she is scheduled to leave tomorrow on a trip to [place].  Up to this point she had been feeling in her usual state of health.    ROS A comprehensive review of systems was performed and was otherwise negative    Medications, allergies, and problem list were reviewed and updated    Exam  General Appearance:   On examination her blood pressure was 110/64.  Weight is 138 pounds and height is 68 inches.  BMI is 20.98.    Heart is in sinus rhythm with a rate of 76 and no ectopy.    Abdomen is soft but quite tender in the right lower quadrant.  No abdominal distention.  No obvious masses.    The patient is alert and oriented ×3.      Assessment/Plan  1. RLQ abdominal pain  White Blood Count (WBC)    CT Abdomen Pelvis With Oral With IV Contrast     Fairly acute right lower quadrant abdominal pain.  I am not sure that this relates to the symptoms earlier of nausea and vomiting.  There may have been some food poisoning but I do not think that would be causing her abdominal pain.  The possibility of an acute appendicitis is there.   I would like to check a white blood count and then have her do a CT scan as soon as possible today.  We will follow-up later today with those results and make appropriate recommendations.      Samson Quiñones MD      Current Outpatient Prescriptions on File Prior to Visit   Medication Sig     ferrous sulfate 325 (65 FE) MG tablet Take 1 tablet (325 mg total) by mouth 3 (three) times a day with meals.     ibuprofen (ADVIL,MOTRIN) 600 MG tablet Take 1 tablet (600 mg total) by mouth every 6 (six) hours as needed for pain.     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 A delay in compatible RBCs may occur.     Latex Swelling     Social History   Substance Use Topics     Smoking status: Current Every Day Smoker     Packs/day: 0.10     Smokeless tobacco: Never Used      Comment: 4-5 daily     Alcohol use No

## 2021-06-18 NOTE — PROGRESS NOTES
[facility] Follow Up Note    Bonnie Webster   37 y.o. female    Date of Visit: 5/14/2018    Chief Complaint   Patient presents with     Leg Pain     left inner thigh since yesterday     Subjective  This is a 37-year-old patient of Dr. Samson Ingram.  She comes in today because of pain in her upper left thigh region.  She tells me that she was shooting baskets with her daughter yesterday and attempted a layup.  She immediately felt pain in the upper inner left thigh region.  She did not participate in any additional activity but she did not ice the leg down at that time.  She has worked all day today and the pain has continued.  No significant swelling.  Walking is becoming increasingly difficult as the day has progressed.  No other injuries reported.    ROS A comprehensive review of systems was performed and was otherwise negative    Medications, allergies, and problem list were reviewed and updated    Exam  General Appearance:   On examination her blood pressure is 108/60.  Weight is 136 pounds and height is 68 inches.  BMI is 20.68.    Heart is in sinus rhythm with a rate of 60 and no ectopy.    She is quite tender in the upper thigh region on the inner left side of the leg.  No obvious swelling.  She is having difficulty in flexing and extending at the knee and in raising the leg.    The patient is alert and oriented ×3.      Assessment/Plan  1. Muscle strain       Probable acute muscle strain in the left thigh.  I advised her to go home and put some cold packs on it tonight a couple of times.  I suggested that she try to find a thigh sleeve to wear at work over the next few days.  She should not wear this at night.  She can continue the cold packs again tomorrow.  If she is not improving she will follow-up.      Samson Quiñones MD      Current Outpatient Prescriptions on File Prior to Visit   Medication Sig     ferrous sulfate 325 (65 FE) MG tablet Take 1 tablet (325 mg total) by mouth 3  (three) times a day with meals.     ibuprofen (ADVIL,MOTRIN) 600 MG tablet Take 1 tablet (600 mg total) by mouth every 6 (six) hours as needed for pain.     [DISCONTINUED] pseudoephedrine (SUDAFED) 30 MG tablet Take 1 tablet (30 mg total) by mouth every 6 (six) hours as needed for congestion.     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 A delay in compatible RBCs may occur.     Latex Swelling     Social History   Substance Use Topics     Smoking status: Current Every Day Smoker     Packs/day: 0.10     Smokeless tobacco: Never Used      Comment: 4-5 daily     Alcohol use No

## 2021-06-19 NOTE — PROGRESS NOTES
arrived at clinic for Cyanocobalamin injection given Intramuscular at Right deltoid.      Injection was given without incidence.      Annie GÓMEZ, ЕЛЕНА/CMT....................2:24 PM

## 2021-06-19 NOTE — LETTER
"Letter by Elsa Gonzales LICSW at      Author: Elsa Gonzales LICSW Service: -- Author Type: --    Filed:  Encounter Date: 10/31/2019 Status: Signed         October 31, 2019        Bonnie Webster  [address]  [place] [address]         Dear Bonnie,       We missed you today on 10/16/19 and on 10/9/2019.   Your file indicates this was your second\"No Show\" appointment.  Per our policy and per the patient consent for service agreement reviewed and signed at your first visit I will not be able to accept any more  \"No Show\" or \"Late Cancel\" this calendar year.      I understand life at times will get in the way of keeping appointments however it is important to call this clinic to cancel if you find you are not able to come in.  Cancelling 24 hours prior lets me know your situation and may allow me to schedule another patient who also needs my care.      Please know I take your care very seriously.  It is important to me that you reach your therapy goals. Unfortunately, this is made more difficult if your do not come to see me as agreed on.      If you don't already have an appointment scheduled and would like to return, please call this clinic to set up a new time.  You can reach the clinic at [phone].        Thank you,    Sincerely,          [facility]  [address]  [place]  [address] [phone]       "

## 2021-06-19 NOTE — LETTER
Letter by Samson Ingram MD at      Author: Samson Ingram MD Service: -- Author Type: --    Filed:  Encounter Date: 5/29/2019 Status: (Other)         Bonnie Webster  [address] [place] [address]             May 29, 2019         Dear MsArnaldo Webster,    Below are the results from your recent visit:    Resulted Orders   Thyroid Stimulating Hormone (TSH)   Result Value Ref Range    TSH 1.54 0.30 - 5.00 uIU/mL   T4, Free   Result Value Ref Range    Free T4 0.8 0.7 - 1.8 ng/dL   T3, Total   Result Value Ref Range    T3, Total 133 45 - 175 ng/dL       Thyroid function looks okay, excellent.  Your hemoglobin is now 11 as well, excellent.    Please call with questions or contact us using [employer].    Sincerely,        Electronically signed by Samson Ingram MD

## 2021-06-19 NOTE — LETTER
Letter by Elsa Gonzales LICSW at      Author: Elsa Gonzales LICSW Service: -- Author Type: --    Filed:  Encounter Date: 12/5/2019 Status: Signed         December 5, 2019     Patient: Bonnie Webster   YOB: 1980   Date of Visit: 12/5/2019       To Whom It May Concern:    It is my medical opinion that Bonnie Webster may return to work on 12/9/2019 on a reduced schedule of 20 hours per week.  She should remian at half time until 12/23/2019 where upon she should return to work full time.    If you have any questions or concerns, please don't hesitate to call.    Sincerely,        Electronically signed by NOHEMI Meek

## 2021-06-19 NOTE — PROGRESS NOTES
Office Visit - Follow Up   Bonnie Webster   37 y.o. female    Date of Visit: 7/9/2018    Chief Complaint   Patient presents with     Follow-up     Gallstones-pt states that is getting increasingly worse-advil, ibuprofen does not dull the pain-states that she is unable due to the pain, as soon as she takes the first 2-3 bites her stomach will start hurting and she will be unable to eat.        Assessment and Plan   1. Abdominal pain, generalized  Complains of abdominal pains since June.  Coming off and on.  But in the last few weeks pains have been more persistent and occurring daily.  Scales her pains at 6 out of 10.  Had an abdominal CT  on 6/14/2017 showing multiple gallstone and borderline splenomegaly. Does not drink alcohol.  Will check the following.  Will refer her to surgery.  - HM2(CBC w/o Differential)  - Basic Metabolic Panel  - Hepatic Profile  - Urinalysis-UC if Indicated  - Lipase  - Amylase    2. Multiple gallstones  Has multiple gallstones.  Suspect this is the cause of her abdominal pains.  Her abdominal pain seems more pronounced in the left upper quadrant not on the right upper quadrant.  No need to repeat another imaging study since she already had recent CT scan.  - Ambulatory referral to General Surgery      Will make sure she can be seen by surgery soon.     History of Present Illness   This 37 y.o. old female complains of recurring abdominal pains which started about 2 months ago.  Had sudden onset of diffuse abdominal pains more localized in the left upper quadrant.  Pains have gotten more pronounced in the last several weeks and occurring daily.  Scales her pains at 6 out of 10.  Was seen by Dr. Quiñones in June for this and had abdominal CT which showed multiple gallstones and borderline splenomegaly.  No appendicitis and no other findings in  her pancreas.  Does not have nausea and vomiting.  Does not have urinary and bowel symptoms.  Does not have fever.  Basically healthy.  Does not  drink alcohol.  But smokes cigarettes.    Review of Systems   A 12 point comprehensive review of systems was negative except as noted..     Medications, Allergies and Problem List   Reviewed and updated             Chief Complaint   Follow-up (Gallstones-pt states that is getting increasingly worse-advil, ibuprofen does not dull the pain-states that she is unable due to the pain, as soon as she takes the first 2-3 bites her stomach will start hurting and she will be unable to eat.)       Patient Profile   Social History     Social History Narrative    Lives with her son, Sincere () and daughter Semaj ().  Works for [employer], reception.        Past Medical History   Patient Active Problem List   Diagnosis     Pernicious anemia     Tobacco abuse     Iron malabsorption     Iron deficiency anemia       Past Surgical History  She has a past surgical history that includes Bone marrow biopsy; ORIF metacarpal fracture (Left, ); and  section.       Medications and Allergies   Current Outpatient Prescriptions   Medication Sig     ferrous sulfate 325 (65 FE) MG tablet Take 1 tablet (325 mg total) by mouth 3 (three) times a day with meals.     ibuprofen (ADVIL,MOTRIN) 600 MG tablet Take 1 tablet (600 mg total) by mouth every 6 (six) hours as needed for pain.     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 A delay in compatible RBCs may occur.     Latex Swelling        Family and Social History   Family History   Problem Relation Age of Onset     No Medical Problems Mother      No Medical Problems Father      unknown     No Medical Problems Sister      No Medical Problems Brother      No Medical Problems Son      No Medical Problems Daughter         Social History   Substance Use Topics      "Smoking status: Current Every Day Smoker     Packs/day: 0.10     Smokeless tobacco: Never Used      Comment: 4-5 daily     Alcohol use No        Physical Exam       Physical Exam  BP 98/66  Pulse 87  Ht 5' 8\" (1.727 m)  Wt 138 lb (62.6 kg)  SpO2 100%  BMI 20.98 kg/m2  General appearance: alert, appears stated age, cooperative and no distress but in pain  Head: Normocephalic, without obvious abnormality, atraumatic  Throat: lips, mucosa, and tongue normal; teeth and gums normal  Neck: no adenopathy, no carotid bruit, no JVD, supple, symmetrical, trachea midline and thyroid not enlarged, symmetric, no tenderness/mass/nodules  Lungs: clear to auscultation bilaterally  Heart: regular rate and rhythm, S1, S2 normal, no murmur, click, rub or gallop  Abdomen: Soft, nontender mid abdomen, left upper quadrant and left flank, normal bowel sounds no rebound or guarding  Extremities: extremities normal, atraumatic, no cyanosis or edema  Skin: Skin color, texture, turgor normal. No rashes or lesions     Additional Information        Jakob Orozco MD  Internal Medicine  Contact me at [phone]     Additional Information   Current Outpatient Prescriptions   Medication Sig     ferrous sulfate 325 (65 FE) MG tablet Take 1 tablet (325 mg total) by mouth 3 (three) times a day with meals.     ibuprofen (ADVIL,MOTRIN) 600 MG tablet Take 1 tablet (600 mg total) by mouth every 6 (six) hours as needed for pain.     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 A delay in compatible RBCs may occur.     Latex Swelling     Social History   Substance Use Topics     Smoking status: Current Every Day Smoker     Packs/day: 0.10     Smokeless tobacco: Never Used      Comment: 4-5 daily     Alcohol use No         Time: total time spent " with the patient was 25 minutes of which >50% was spent in counseling and coordination of care

## 2021-06-20 NOTE — PROGRESS NOTES
Office Visit - Follow Up   Bonnie Webster   38 y.o. female    Date of Visit: 9/19/2018    Chief Complaint   Patient presents with     Hospital Visit Follow Up        Assessment and Plan   1. Pernicious anemia  She has been repleted vitamin B12    2. Iron deficiency anemia due to chronic blood loss  She has received iron sucrose 200 mg.  Dr. Collins has ordered additional infusions in the outpatient infusion center but were awaiting insurance approval.  She is taking oral ferrous sulfate twice daily.  She has had issues with constipation in the past.  She has significant menstrual blood losses.  She is going to see gynecology about this.  She has never used aminocaproic acid.  This could be considered.  - Hemoglobin  - Reticulocytes    3. Heavy menses  As above    Return in about 4 weeks (around 10/17/2018) for recheck.     History of Present Illness   This 38 y.o. old woman comes in for post hospital follow-up.  She is admitted with significant anemia.  This is not a new problem.  She is been both vitamin B12 deficient as well as iron deficient.  She is having menstrual blood losses.  Her vitamin B12 stores have been repleted but her iron stores are still quite low.  Her MCV is 66 and her ferritin is 3.  She was given iron sucrose 200 mg in the hospital and then discharged.  She has not had further iron infusion because of insurance issues.  She has applied for insurance and this should be active soon.  It will be retroactive.  She continues to have some generalized aches and pains.  She otherwise is feeling okay.  Menstrual period recently started.  She is going to see gynecology next week.    Review of Systems: A comprehensive review of systems was negative except as noted.     Medications, Allergies and Problem List   Reviewed, reconciled and updated     Physical Exam   General Appearance:   No acute distress    /64 (Patient Site: Left Arm, Patient Position: Sitting, Cuff Size: Adult Regular)  Pulse 68   "Ht 5' 7\" (1.702 m)  Wt 133 lb (60.3 kg)  SpO2 100%  BMI 20.83 kg/m2    HEENT exam is unremarkable  Neck supple no thyromegaly or nodule palpable  Lymphatic no cervical lymphadenopathy  Cardiovascular regular rate and rhythm no murmur gallop or rub  Pulmonary lungs are clear to auscultation bilaterally  Gastrointestinall abdomen soft nontender nondistended no organomegaly  Neurologic exam is non focal  Psychiatric pleasant, no confusion or agitation        Additional Information   Current Outpatient Prescriptions   Medication Sig Dispense Refill     docusate sodium (COLACE) 100 MG capsule Take 1 capsule (100 mg total) by mouth 2 (two) times a day. 60 capsule 0     ferrous sulfate 325 (65 FE) MG tablet Take 1 tablet (325 mg total) by mouth 2 (two) times a day with meals. Take with orange juice for better absorption. 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 A delay in compatible RBCs may occur.     Latex Swelling     Social History   Substance Use Topics     Smoking status: Current Every Day Smoker     Packs/day: 0.10     Smokeless tobacco: Never Used      Comment: 4-5 daily     Alcohol use No       Review and/or order of clinical lab tests:  Review and/or order of radiology tests:  Review and/or order of medicine tests:  Discussion of test results with performing physician:  Decision to obtain old records and/or obtain history from someone other than the patient:  Review and summarization of old records and/or obtaining history from someone other than the patient " and.or discussion of case with another health care provider:  Independent visualization of image, tracing or specimen itself:    Time:      Samson Ingram MD

## 2021-06-20 NOTE — PROGRESS NOTES
TCM DISCHARGE FOLLOW UP CALL    Discharge Date:  9/12/2018  Reason for hospital stay (discharge diagnosis)::  Anemia  Are you feeling better, the same or worse since your discharge?:  Patient is feeling worse  Why are you feeling worse?:  Very bad H/A  Do you feel like you have a plan in the event of a health emergency?: Yes (Pt is aware of nurse triage)    As part of your discharge plan, were  home care services ordered for you?: No    Did you receive any new medications, or was there a change to your medications?: Yes    Are you taking those medications, or do you have any established regiment?:  Pt is taking iron and colace BID  Do you have any follow up visits scheduled with your PCP or Specialist?:  Yes, with PCP (GYN appt 9/24. Still trying to get an appt with Dr Collins. )  RN NOTES::  Suggested pt  talk to the clinic specialty  if she can't get in to see Dr Collins to see if there is hematology availability someplace else in her network

## 2021-06-20 NOTE — LETTER
Letter by Rolanda Xiong MD at      Author: Rolanda Xiong MD Service: -- Author Type: --    Filed:  Encounter Date: 10/6/2020 Status: (Other)         Bonnie Webster  [address]  [place] [address]             October 6, 2020         Dear Ms. Webster,    Below are the results from your recent visit:    Resulted Orders   Thyroid Stimulating Hormone (TSH)   Result Value Ref Range    TSH 1.53 0.30 - 5.00 uIU/mL   T3, Total   Result Value Ref Range    T3, Total 102 45 - 175 ng/dL   T4, Free   Result Value Ref Range    Free T4 0.8 0.7 - 1.8 ng/dL   HM1 (CBC with Diff)   Result Value Ref Range    WBC 3.8 (L) 4.0 - 11.0 thou/uL    RBC 3.81 3.80 - 5.40 mill/uL    Hemoglobin 11.6 (L) 12.0 - 16.0 g/dL    Hematocrit 35.5 35.0 - 47.0 %    MCV 93 80 - 100 fL    MCH 30.4 27.0 - 34.0 pg    MCHC 32.7 32.0 - 36.0 g/dL    RDW 14.5 11.0 - 14.5 %    Platelets 226 140 - 440 thou/uL    MPV 10.6 8.5 - 12.5 fL    Neutrophils % 49 (L) 50 - 70 %    Lymphocytes % 41 (H) 20 - 40 %    Monocytes % 8 2 - 10 %    Eosinophils % 2 0 - 6 %    Basophils % 1 0 - 2 %    Immature Granulocyte % 0 <=0 %    Neutrophils Absolute 1.8 (L) 2.0 - 7.7 thou/uL    Lymphocytes Absolute 1.5 0.8 - 4.4 thou/uL    Monocytes Absolute 0.3 0.0 - 0.9 thou/uL    Eosinophils Absolute 0.1 0.0 - 0.4 thou/uL    Basophils Absolute 0.0 0.0 - 0.2 thou/uL    Immature Granulocyte Absolute 0.0 <=0.0 thou/uL        Good news is that her thyroid function is now normal . This makes the diangosis of graves disease very unlikely  and she must have hashimotos . Now the only step is the biopsy of the nodule which should take priority.    Please call with questions or contact us using [employer] [facility].    Sincerely,        Electronically signed by Rolanda Xiong MD

## 2021-06-20 NOTE — LETTER
Letter by Rolanda Xiong MD at      Author: Rolanda Xiong MD Service: -- Author Type: --    Filed:  Encounter Date: 9/16/2020 Status: (Other)         September 20, 2020     Patient: Bonnie Webster   YOB: 1980   Date of Visit: 9/16/2020       To Whom It May Concern:    It is my medical opinion that Bonnie Webster may return to work sept/ 21/2020 with reduced work hours starting 11 am till 4 pm for the next 4 weeks . This will allow her to recover and go for treatment and doctor visits . .    If you have any questions or concerns, please don't hesitate to call.    Sincerely,        Electronically signed by Rolanda Xiong MD

## 2021-06-21 NOTE — LETTER
Letter by Lorrie Enriquez MD at      Author: Lorrie Enriquez MD Service: -- Author Type: --    Filed:  Encounter Date: 3/5/2021 Status: (Other)         Bonnie Webster  [address]  [place] [address]             March 5, 2021         Dear Ms. Webster,    Below are the results from your recent visit:    Resulted Orders   HM2(CBC w/o Differential)   Result Value Ref Range    WBC 3.6 (L) 4.0 - 11.0 thou/uL    RBC 3.31 (L) 3.80 - 5.40 mill/uL    Hemoglobin 9.6 (L) 12.0 - 16.0 g/dL    Hematocrit 30.3 (L) 35.0 - 47.0 %    MCV 92 80 - 100 fL    MCH 29.0 27.0 - 34.0 pg    MCHC 31.7 (L) 32.0 - 36.0 g/dL    RDW 14.2 11.0 - 14.5 %    Platelets 254 140 - 440 thou/uL    MPV 9.8 7.0 - 10.0 fL   Comprehensive Metabolic Panel   Result Value Ref Range    Sodium 138 136 - 145 mmol/L    Potassium 4.2 3.5 - 5.0 mmol/L    Chloride 105 98 - 107 mmol/L    CO2 26 22 - 31 mmol/L    Anion Gap, Calculation 7 5 - 18 mmol/L    Glucose 86 70 - 125 mg/dL    BUN 12 8 - 22 mg/dL    Creatinine 0.80 0.60 - 1.10 mg/dL    GFR MDRD Af Amer >60 >60 mL/min/1.73m2    GFR MDRD Non Af Amer >60 >60 mL/min/1.73m2    Bilirubin, Total 0.3 0.0 - 1.0 mg/dL    Calcium 8.8 8.5 - 10.5 mg/dL    Protein, Total 7.1 6.0 - 8.0 g/dL    Albumin 4.3 3.5 - 5.0 g/dL    Alkaline Phosphatase 38 (L) 45 - 120 U/L    AST 19 0 - 40 U/L    ALT 10 0 - 45 U/L    Narrative    Fasting Glucose reference range is 70-99 mg/dL per  American Diabetes Association (ADA) guidelines.   Thyroid Cascade   Result Value Ref Range    TSH 69.59 (H) 0.30 - 5.00 uIU/mL   T4, Free   Result Value Ref Range    Free T4 0.5 (L) 0.7 - 1.8 ng/dL   Iron and Transferrin Iron Binding Capacity   Result Value Ref Range    Iron 28 (L) 42 - 175 ug/dL    Transferrin 356 212 - 360 mg/dL    Transferrin Saturation, Calculated 6 (L) 20 - 50 %    Transferrin IBC, Calculated 445 313 - 563 ug/dL   Ferritin   Result Value Ref Range    Ferritin 7 (L) 10 - 130 ng/mL       As we discussed thyroid  stimulating hormone is very high, I did send a new levothyroxine dose to your pharmacy.  Iron level is also mildly low, make sure you take some oral iron replacement therapy and follow-up with your primary care doctor.    Please call with questions or contact us using [employer].    Sincerely,        Electronically signed by Lorrie Enriquez MD

## 2021-06-21 NOTE — LETTER
Letter by Rolanda Xiong MD at      Author: Rolanda Xiong MD Service: -- Author Type: --    Filed:  Encounter Date: 1/12/2021 Status: (Other)         Bonnie Webster  [address]  [place] [address]             January 12, 2021         Dear Ms. Webster,    Below are the results from your recent visit:    Resulted Orders   Thyroid Stimulating Hormone (TSH)   Result Value Ref Range    TSH 2.19 0.30 - 5.00 uIU/mL   Iron and Transferrin Iron Binding Capacity   Result Value Ref Range    Iron 42 42 - 175 ug/dL    Transferrin 299 212 - 360 mg/dL    Transferrin Saturation, Calculated 11 (L) 20 - 50 %    Transferrin IBC, Calculated 374 313 - 563 ug/dL   Basic Metabolic Panel   Result Value Ref Range    Sodium 139 136 - 145 mmol/L    Potassium 4.9 3.5 - 5.0 mmol/L    Chloride 107 98 - 107 mmol/L    CO2 25 22 - 31 mmol/L    Anion Gap, Calculation 7 5 - 18 mmol/L    Glucose 95 70 - 125 mg/dL    Calcium 9.0 8.5 - 10.5 mg/dL    BUN 13 8 - 22 mg/dL    Creatinine 0.74 0.60 - 1.10 mg/dL    GFR MDRD Af Amer >60 >60 mL/min/1.73m2    GFR MDRD Non Af Amer >60 >60 mL/min/1.73m2    Narrative    Fasting Glucose reference range is 70-99 mg/dL per  American Diabetes Association (ADA) guidelines.   HM1 (CBC with Diff)   Result Value Ref Range    WBC 3.9 (L) 4.0 - 11.0 thou/uL    RBC 3.71 (L) 3.80 - 5.40 mill/uL    Hemoglobin 11.6 (L) 12.0 - 16.0 g/dL    Hematocrit 33.9 (L) 35.0 - 47.0 %    MCV 91 80 - 100 fL    MCH 31.2 27.0 - 34.0 pg    MCHC 34.1 32.0 - 36.0 g/dL    RDW 13.8 11.0 - 14.5 %    Platelets 201 140 - 440 thou/uL    MPV 8.5 7.0 - 10.0 fL    Neutrophils % 56 50 - 70 %    Lymphocytes % 34 20 - 40 %    Monocytes % 8 2 - 10 %    Eosinophils % 2 0 - 6 %    Basophils % 1 0 - 2 %    Neutrophils Absolute 2.2 2.0 - 7.7 thou/uL    Lymphocytes Absolute 1.3 0.8 - 4.4 thou/uL    Monocytes Absolute 0.3 0.0 - 0.9 thou/uL    Eosinophils Absolute 0.1 0.0 - 0.4 thou/uL    Basophils Absolute 0.0 0.0 - 0.2 thou/uL       The  electrolytes, kidney tests are all normal .   Thyroid test is normal   White count is slightly low but stable . There is mild anemia but no iron infusion is not needed   Overall good results     Please call with questions or contact us using MyChart.    Sincerely,        Electronically signed by Rolanda Xiong MD

## 2021-06-21 NOTE — LETTER
Letter by Rolanda Xiong MD at      Author: Rolanda Xiong MD Service: -- Author Type: --    Filed:  Encounter Date: 1/12/2021 Status: (Other)         Bonnie Webster  [address]  [place] [address]             January 12, 2021         Dear Ms. Webster,    Below are the results from your recent visit:    Resulted Orders   XR Chest 2 Views    Narrative    EXAM: XR CHEST 2 VIEWS  LOCATION: [facility]  DATE/TIME: 1/7/2021 10:01 AM    INDICATION: Encounter for other preprocedural examination  COMPARISON: 6/18/2020      Impression    Negative chest.       Your xray had been reviewed and was normal showing no pneumonia . This is the above report   Please call with questions or contact us using [employer].    Sincerely,        Electronically signed by Rolanda Xiong MD

## 2021-06-21 NOTE — LETTER
Letter by Lorrie Enriquez MD at      Author: Lorrie Enriquez MD Service: -- Author Type: --    Filed:  Encounter Date: 3/1/2021 Status: (Other)         March 2, 2021     Patient: Bonnie Webster   YOB: 1980   Date of Visit: 3/1/2021       To Whom It May Concern:    It is my medical opinion that Bonnie Webster should remain out of work until 03/04/21.    If you have any questions or concerns, please don't hesitate to call.    Sincerely,        Electronically signed by Lorrie Enriquez MD

## 2021-06-21 NOTE — LETTER
Letter by Rolanda Xiong MD at      Author: Rolanda Xiong MD Service: -- Author Type: --    Filed:  Encounter Date: 1/7/2021 Status: (Other)         January 7, 2021     Patient: Bonnie Webster   YOB: 1980   Date of Visit: 1/7/2021       To Whom it May Concern:    Bonnie Webster was seen in my clinic on 1/7/2021.    If you have any questions or concerns, please don't hesitate to call.    Sincerely,         Electronically signed by Rolanda Xiong MD

## 2021-06-21 NOTE — LETTER
Letter by Rolanda Xiong MD at      Author: Rolanda Xiong MD Service: -- Author Type: --    Filed:  Encounter Date: 11/16/2020 Status: (Other)         November 16, 2020     Patient: Bonnie Webster   YOB: 1980   Date of Visit: 11/16/2020       To Whom it May Concern:    Bonnie Webster was seen in my clinic on 11/16/2020.  She should be excused from work for the next three days .   If you have any questions or concerns, please don't hesitate to call.    Sincerely,         Electronically signed by Rolanda Xiong MD

## 2021-06-23 NOTE — PROGRESS NOTES
Office Visit - Follow Up   Bonnie Webster   38 y.o. female    Date of Visit: 1/24/2019    Chief Complaint   Patient presents with     Headache     Dizziness     Emesis        Assessment and Plan   1. Severe anemia  Recheck labs.  She needs to get an iron infusion.  Continue with oral iron, she should restart vitamin B12 injections.  Follow-up with hematology.  - HM1(CBC and Differential)  - Vitamin B12  - Ferritin  - HM1 (CBC with Diff)    2. Acute intractable headache, unspecified headache type  Features consistent with a migraine type headache.  She has photophobia, visual scotomata and nausea and vomiting.  We will try Imitrex and naproxen along with Zofran.  She may need infusion of medications but has had issues with insurance.    3. Menometrorrhagia  She needs to follow-up with gynecology    Return in about 1 week (around 1/31/2019) for recheck.     History of Present Illness   This 38 y.o. old woman comes in for evaluation of headache, nausea and vomiting.  This been going on for the past week or so.  She is not been able to work.  She has light sensitivity, visual scotomata, nausea and vomiting.  She has had a known anemia, both pernicious and iron deficiency.  She stopped taking vitamin B12 and had not been taking iron.  It had been recommended that she receive an iron infusion but has not been able to do this because of insurance reasons.  She has insurance starting the first of the month.  She did start taking iron on Friday.  She has a history of headaches and has previously responded to over-the-counter analgesics but she has not tried these.  She is not sure if she is ever had migraines before.  Headaches seem to be tied to her menstrual cycle which has been a bit irregular.  She is not pregnant, just had a menstrual cycle and has had not had sexual intercourse since August.    Review of Systems: A comprehensive review of systems was negative except as noted.     Medications, Allergies and Problem  "List   Reviewed, reconciled and updated     Physical Exam   General Appearance:   No acute distress, uncomfortable      BP 96/60 (Patient Site: Right Arm, Patient Position: Sitting, Cuff Size: Adult Regular)   Pulse 81   Ht 5' 7\" (1.702 m)   Wt 133 lb (60.3 kg)   SpO2 100%   BMI 20.83 kg/m      HEENT exam is unremarkable  Neck supple no thyromegaly or nodule palpable  Lymphatic no cervical lymphadenopathy  Cardiovascular regular rate and rhythm no murmur gallop or rub  Pulmonary lungs are clear to auscultation bilaterally  Gastrointestinall abdomen soft nontender nondistended no organomegaly  Neurologic exam is non focal  Psychiatric pleasant, no confusion or agitation        Additional Information   Current Outpatient Medications   Medication Sig Dispense Refill     ferrous sulfate 325 (65 FE) MG tablet Take 1 tablet (325 mg total) by mouth 2 (two) times a day with meals. Take with orange juice for better absorption. 60 tablet 0     naproxen (NAPROSYN) 500 MG tablet Take 1 tablet (500 mg total) by mouth 3 (three) times a day as needed (take with sumatriptan). 30 tablet 3     ondansetron (ZOFRAN) 4 MG tablet Take 1 tablet (4 mg total) by mouth daily as needed for nausea. 30 tablet 1     SUMAtriptan (IMITREX) 50 MG tablet Take 1 tablet (50 mg total) by mouth every 2 (two) hours as needed for migraine (max 200mg in 24hr period). 10 tablet 3     No current facility-administered medications for this visit.      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 A delay in compatible RBCs may occur.     Latex Swelling     Social History     Tobacco Use     Smoking status: Current Every Day Smoker     Packs/day: 0.10     Smokeless tobacco: Never Used     Tobacco comment: 4-5 daily   Substance Use Topics     Alcohol use: No "     Drug use: No       Review and/or order of clinical lab tests:  Review and/or order of radiology tests:  Review and/or order of medicine tests:  Discussion of test results with performing physician:  Decision to obtain old records and/or obtain history from someone other than the patient:  Review and summarization of old records and/or obtaining history from someone other than the patient and.or discussion of case with another health care provider:  Independent visualization of image, tracing or specimen itself:    Time:      Samson Ingram MD

## 2021-06-23 NOTE — TELEPHONE ENCOUNTER
Dr. Ingram  I called and notified Bonnie of her lab results.  She is wondering if you can call her.  She wants to talk to you about her headaches.  Please call Bonnie.

## 2021-06-24 NOTE — PROGRESS NOTES
"  Office Visit - Follow Up   Bonnie Webster   38 y.o. female    Date of Visit: 3/12/2019    Chief Complaint   Patient presents with     Paperwork     Dizziness        Assessment and Plan   1. Pernicious anemia  2. Iron deficiency anemia due to chronic blood loss  Will receive iron infusion this week, resume vitamin B12 injections, follow hemoglobin    3. Iron malabsorption  Unclear if she truly has malabsorption or if she is intolerant of oral iron and does not take it    4. Menorrhagia with regular cycle  She is going to make an appointment with gynecology.  Consideration of aminocaproic acid, to discuss with gynecology.    Return in about 4 weeks (around 4/9/2019) for recheck.     History of Present Illness   This 38 y.o. old woman comes in for evaluation of anemia.  She has had iron deficiency anemia and pernicious anemia.  She has not tolerated oral iron.  She has heavy menses and uses 20 pads in 4 days.  This occurs every 20 days or so.  She is going to make an appointment with gynecology.  She does not want to take oral contraceptive pills.  She does not want a hysterectomy.  She has never tried aminocaproic acid.  She needs an iron infusion and to resume vitamin B12 injections.  She had a heavy menses that ended this week and is feeling lightheaded and dizzy.  No chest pain.    Review of Systems: A comprehensive review of systems was negative except as noted.     Medications, Allergies and Problem List   Reviewed, reconciled and updated     Physical Exam   General Appearance:   No acute distress    /62 (Patient Site: Right Arm, Patient Position: Sitting, Cuff Size: Adult Regular)   Pulse 87   Ht 5' 7\" (1.702 m)   Wt 129 lb (58.5 kg)   SpO2 100%   BMI 20.20 kg/m      HEENT exam is unremarkable  Neck supple no thyromegaly or nodule palpable  Lymphatic no cervical lymphadenopathy  Cardiovascular regular rate and rhythm no murmur gallop or rub  Pulmonary lungs are clear to auscultation " bilaterally  Gastrointestinall abdomen soft nontender nondistended no organomegaly  Neurologic exam is non focal  Psychiatric pleasant, no confusion or agitation        Additional Information   Current Outpatient Medications   Medication Sig Dispense Refill     ferrous sulfate 325 (65 FE) MG tablet Take 1 tablet (325 mg total) by mouth 2 (two) times a day with meals. Take with orange juice for better absorption. 60 tablet 0     naproxen (NAPROSYN) 500 MG tablet Take 1 tablet (500 mg total) by mouth 3 (three) times a day as needed (take with sumatriptan). 30 tablet 3     ondansetron (ZOFRAN) 4 MG tablet Take 1 tablet (4 mg total) by mouth daily as needed for nausea. 30 tablet 1     SUMAtriptan (IMITREX) 50 MG tablet Take 1 tablet (50 mg total) by mouth every 2 (two) hours as needed for migraine (max 200mg in 24hr period). 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 A delay in compatible RBCs may occur.     Latex Swelling     Social History     Tobacco Use     Smoking status: Current Every Day Smoker     Packs/day: 0.10     Smokeless tobacco: Never Used     Tobacco comment: 4-5 daily   Substance Use Topics     Alcohol use: No     Drug use: No       Review and/or order of clinical lab tests:  Review and/or order of radiology tests:  Review and/or order of medicine tests:  Discussion of test results with performing physician:  Decision to obtain old records and/or obtain history from someone other  than the patient:  Review and summarization of old records and/or obtaining history from someone other than the patient and.or discussion of case with another health care provider:  Independent visualization of image, tracing or specimen itself:    Time:      Samson Ingram MD

## 2021-06-25 NOTE — PATIENT INSTRUCTIONS - HE
Come to see Dr. Burkett tomorrow.  Arrive at 1100 AM. Your appointment is at 1115 AM. Seek medical help if you have any further symptoms of reaction from the Feraheme.      Patient Education     Ferumoxytol Solution for injection  What is this medicine?  FERUMOXYTOL is an iron complex. Iron is used to make healthy red blood cells, which carry oxygen and nutrients throughout the body. This medicine is used to treat iron deficiency anemia in people with chronic kidney disease.  This medicine may be used for other purposes; ask your health care provider or pharmacist if you have questions.  What should I tell my health care provider before I take this medicine?  They need to know if you have any of these conditions:    anemia not caused by low iron levels    high levels of iron in the blood    magnetic resonance imaging (MRI) test scheduled    an unusual or allergic reaction to iron, other medicines, foods, dyes, or preservatives    pregnant or trying to get pregnant    breast-feeding  How should I use this medicine?  This medicine is for injection into a vein. It is given by a health care professional in a hospital or clinic setting.  Talk to your pediatrician regarding the use of this medicine in children. Special care may be needed.  Overdosage: If you think you've taken too much of this medicine contact a poison control center or emergency room at once.  NOTE: This medicine is only for you. Do not share this medicine with others.  What if I miss a dose?  It is important not to miss your dose. Call your doctor or health care professional if you are unable to keep an appointment.  What may interact with this medicine?  This medicine may interact with the following medications:    other iron products  This list may not describe all possible interactions. Give your health care provider a list of all the medicines, herbs, non-prescription drugs, or dietary supplements you use. Also tell them if you smoke, drink alcohol, or  use illegal drugs. Some items may interact with your medicine.  What should I watch for while using this medicine?  Visit your doctor or healthcare professional regularly. Tell your doctor or healthcare professional if your symptoms do not start to get better or if they get worse. You may need blood work done while you are taking this medicine.  You may need to follow a special diet. Talk to your doctor. Foods that contain iron include: whole grains/cereals, dried fruits, beans, or peas, leafy green vegetables, and organ meats (liver, kidney).  What side effects may I notice from receiving this medicine?  Side effects that you should report to your doctor or health care professional as soon as possible:    allergic reactions like skin rash, itching or hives, swelling of the face, lips, or tongue    breathing problems    changes in blood pressure    feeling faint or lightheaded, falls    fever or chills    flushing, sweating, or hot feelings    swelling of the ankles or feet  Side effects that usually do not require medical attention (Report these to your doctor or health care professional if they continue or are bothersome.):    diarrhea    headache    nausea, vomiting    stomach pain  This list may not describe all possible side effects. Call your doctor for medical advice about side effects. You may report side effects to FDA at 5-977-FDA-6071.  Where should I keep my medicine?  This drug is given in a hospital or clinic and will not be stored at home.  NOTE: This sheet is a summary. It may not cover all possible information. If you have questions about this medicine, talk to your doctor, pharmacist, or health care provider.  NOTE:This sheet is a summary. It may not cover all possible information. If you have questions about this medicine, talk to your doctor, pharmacist, or health care provider. Copyright  2015 Gold Standard

## 2021-06-25 NOTE — PROGRESS NOTES
[facility] Hematology and Oncology Progress Note    Patient: Bonnie Webster  MRN: [id number]  Date of Service: 03/15/2019        Reason for Visit    Chief Complaint   Patient presents with     Benign Hematology     Anemia       Assessment and Plan  Cancer Staging  No matching staging information was found for the patient.    ECOG Performance   ECOG Performance Status: 1     Distress Assessment  Distress Assessment Score: 8(r/t fatigue and decreased quality of life)    Pain  Currently in Pain: Yes  Pain Score (Initial OR Reassessment): 7  Location: headache    #.  Severe iron deficiency anemia with microcytic anemia  #.  Vitamin B12 deficiency ?  Pernicious anemia  #.  Menometrorrhagia  #.  Positive for low antibodies from prior multiple transfusion     I review her records going back since 2017.  Her ferritin levels were between 2-3, hemoglobin level were from 5-7 and mostly around 5-6 g/dl.  We discussed about iron replacement and restarting vitamin B12 which she has not been doing it for a while.  We will give IV Venofer 5 doses in 2 weeks. She is willing to restart oral iron 2-3 tablets a day as tolerated.  Advised her to take with citrus juice.    Vitamin B12 level in 2017 was 174.  After she stopped taking vitamin B12, it has been down in the low normal range of 255. I will also give vitamin B12 injection today and to resume monthly vitamin B12 injection.     It appears that iron deficiency stems from menometrorrhagia.  Reported previously evaluated by [facility] and she was put on birth control.  It was unclear to me whether she was truly compliant with that or not.  She reported that there is no evidence of uterine fibroids.  She was discussed about hysterectomy but she declined.  I strongly encouraged her to return to OB/GYN and follow-up and make an appropriate plan for better control of her menstrual bleeding.    Plan:  -Venofer x5 doses in 2 weeks.  -Vitamin B12 1000 mcg injection once a month,  lifelong.  -She will reschedule a follow-up visit with OB/GYN.  -Restart oral iron tablets 2-3 tablets a day as tolerated.  Discussed about iron rich diet as well.  -Decided against blood transfusion today due to stable chronically low hemoglobin, stable hemodynamics with minimally symptomatic, presence of several a low antibodies.  -Follow-up with me in about 6 weeks with repeat labs.    Problem List    1. Microcytic anemia  Ferritin    Iron and Transferrin Iron Binding Capacity    CANCELED: Folate, Serum   2. Adverse effect of other drugs, medicaments and biological substances, initial encounter  DISCONTINUED: sodium chloride 0.9%    DISCONTINUED: iron sucrose IV syringe 200 mg (VENOFER)    DISCONTINUED: cyanocobalamin injection 1,000 mcg      ______________________________________________________________________________    History of Present Illness    Bonnie is a very pleasant 38-year-old -American female who is here for ongoing iron deficiency anemia.  She was diagnosed with iron deficiency anemia for at least 3 years also.  She was on IV Venofer in the past.  She was in the infusion yesterday and reported reaction to Feraheme with presenting symptom of back pain a few minutes after.  Venofer infusion was stopped.  She is here for further evaluation and management of anemia.  Patient was previously seen by Dr. Collins in 2018.    .  Reported menstrual cycle has been every 3 weeks, each cycle lasted 4-7 days.  No passing clots but they are heavy.  She reported her heavy menstrual cycle started about 3 years ago.      Strong family history of iron deficiency anemia.  Denies family history of thalassemia or sickle cell disease.      Pain Status  Currently in Pain: Yes    Review of Systems    Oncology Nurse Assessment/CMA Intake: Constitutional  Constitutional (WDL): Exceptions to WDL  Fatigue: Fatigue not relieved by rest - Limiting instrumental ADL  Neurosensory  Neurosensory (WDL):  Exceptions to WDL  Eye   Eye Disorder (WDL): Exceptions to WDL(visual disturbances)  Ear  Ear Disorder (WDL): All ear disorder elements are within defined limits  Cardiovascular  Cardiovascular (WDL): Exceptions to WDL  Edema: Yes(feet)  Pulmonary  Respiratory (WDL): Exceptions to WDL  Dyspnea: Shortness of breath with minimal exertion, limiting instrumental ADL  Gastrointestinal  Gastrointestinal (WDL): Exceptions to WDL  Anorexia: Loss of appetite without alteration in eating habits  Nausea: Loss of appetite without alteration in eating habits  Vomitin - 2 episodes ( by 5 minutes) in 24 hrs  Genitourinary  Genitourinary (WDL): Exceptions to WDL(on her period)  Lymphatic  Lymph (WDL): Assessment not pertinent to visit  Musculoskeletal and Connective Tissue  Musculoskeletal and Connetive Tissue Disorders (WDL): Exceptions to WDL  Arthralgia: Mild pain  Muscle Weakness : Symptomatic, perceived by patient but not evident on physical exam  Myalgia: Mild pain  Integumentary  Integumentary (WDL): All integumentary elements are within defined limits  Patient Coping  Patient Coping: Accepting  Distress Assessment  Distress Assessment Score: 8(r/t fatigue and decreased quality of life)  Accompanied by  Accompanied by: Alone  Oral Chemo Adherence         Past History  Past Medical History:   Diagnosis Date     Anemia      B12 deficiency anemia        Physical Exam    Recent Vitals 3/15/2019   Height -   Weight -   BSA (m2) -   BP 95/57   Pulse 72   Temp -   Temp src -   SpO2 -   Some recent data might be hidden       General: alert, appears stated age and cooperative  HEENT: Head: Normocephalic, no lesions, without obvious abnormality.  Chest: Normal chest wall and respirations. Clear to auscultation.  Cardiac: regular rate and rhythm, S1, S2 normal, no murmur, click, rub or gallop  Abdomen: abdomen is soft without significant tenderness, masses, organomegaly or guarding  Extremities: normal strength, tone, and  muscle mass  Skin: normal  CNS: non focal.  Lymphatics: No abnormally enlarged lymph nodes.    Lab Results    Recent Results (from the past 168 hour(s))   HM2(CBC w/o Differential)   Result Value Ref Range    WBC 3.6 (L) 4.0 - 11.0 thou/uL    RBC 3.02 (L) 3.80 - 5.40 mill/uL    Hemoglobin 5.3 (LL) 12.0 - 16.0 g/dL    Hematocrit 20.1 (L) 35.0 - 47.0 %    MCV 67 (L) 80 - 100 fL    MCH 17.5 (L) 27.0 - 34.0 pg    MCHC 26.4 (L) 32.0 - 36.0 g/dL    RDW 20.0 (H) 11.0 - 14.5 %    Platelets 183 140 - 440 thou/uL    MPV 11.0 8.5 - 12.5 fL       Imaging    No results found.    TT: 40 minutes and more than 50% was spent on coordination and counseling of care.    Signed by: Steff Burkett MD

## 2021-06-25 NOTE — PROGRESS NOTES
Bonnie Webster arrived for first dose of Feraheme infusion. Labs drawn and results were reviewed. Lab called with critical level at 1019 with Hgb 5.3 today. Critical lab value called to Dr. Chaudhry office at 1025,. I spoke with Janie and gave her the hgb of 5.3.  Bonnie Webster was educated on her plan of care. Each medication given today was reviewed prior to administration. Ferahem 510 mg infusion was started at 1018. At 1027 Feraheme was stopped due to reaction of sudden onset severe back pain with spasms. Diphenhydramine 50 mg IV was administered at 1030. Bonnie does not want any more Feraheme. Another call was placed to Dr. Ingram's office at 1045 and I spoke with Karley mitchell reaction. Then I spoke with Park at 1052 and informed her that Bonnie had developed reaction symptoms of sudden onset severe back pain.  I then spoke with Dr. Ingram and informed him that Bonnie had reaction of sudden onset severe back pain nine minutes into the Feraheme infusion. That the pain was so bad she was crying. Standing up did not help. She had no dizziness, shortness of breath, chest pain, diaphoresis, itching or hives, swelling of face, mouth, tongue. I informed him that she had been medicated with Diphenhydramine 50 mg IV and her symptoms resolved a few minutes later. Dr. Ingram indicated he did not want to change his orders. Re the Hgb of 5.3 Dr. Ingram has deferred that to Hematology. An appointment has been set up for Bonnie Webster to see  tomorrow at 1115. Bonnie's baseline BP 93/47 at 93/47, BP 92/55 at 1050, /53 at 1231. Bonnie did not have any further symptoms of reaction. IV site:peripheral IV remained patent with positive blood return maintained. IV site with no pain, redness, swelling and drainage. IV site flushed with saline and discontinued. Bonnie Webster has follow-up appointment scheduled tomorrow with Dr. Burkett. Bonnie Webster was discharged to home. She discharged from unit in stable condition ambulatory  and independent at 1258. She is having her friend Yenni pick her up because she had Diphenhydramine. The after visit summary was given.     Aracelis Pang

## 2021-06-25 NOTE — PROGRESS NOTES
"  Office Visit - Follow Up   Bonnie Webster   38 y.o. female    Date of Visit: 3/19/2019    Chief Complaint   Patient presents with     Leg Pain     left        Assessment and Plan   1. Adverse effect of other drugs, medicaments and biological substances, initial encounter  Presumably her back and leg pain is related to the iron infusion.  No known history of inflammatory arthritis which can be exacerbated by iron infusion.  We will try prednisone 50 mg a day for 4 days and if this helps that she could be premedicated with methylprednisolone before her next infusion.  Consider that this may not be related to confusion and is related to radicular low back pain as well.    2. Iron deficiency anemia due to chronic blood loss  Continue with iron infusions meeting with gynecology, consider aminocaproic acid for menstrual bleeding    3. Severe anemia  As above    4. Pernicious anemia  Restart vitamin B12 injections    Return in about 2 weeks (around 4/2/2019) for recheck.     History of Present Illness   This 38 y.o. old woman comes in for follow-up after iron infusions.  First infusion she had some back pain and the infusion was stopped and not continue.  Back pain resolved after she was given Benadryl.  Second infusion she did not have any pain with the infusion but since the infusion she is been having left leg pain and numbness.  No injury.  No fever or chills.  She did get a vitamin B12 injection.  She has numerous infusions coming up.  No known history of inflammatory arthritis.    Review of Systems: A comprehensive review of systems was negative except as noted.     Medications, Allergies and Problem List   Reviewed, reconciled and updated     Physical Exam   General Appearance:   No acute distress    /62 (Patient Site: Left Arm, Patient Position: Sitting, Cuff Size: Adult Regular)   Pulse 79   Ht 5' 8.5\" (1.74 m)   Wt 127 lb (57.6 kg)   SpO2 99%   BMI 19.03 kg/m      She has pain with movement of her " left lower extremity.  She has good strength.  Reflexes intact.  Mild tenderness palpation of the low back.     Additional Information   Current Outpatient Medications   Medication Sig Dispense Refill     ferrous sulfate 325 (65 FE) MG tablet Take 1 tablet (325 mg total) by mouth 2 (two) times a day with meals. Take with orange juice for better absorption. 60 tablet 0     naproxen (NAPROSYN) 500 MG tablet Take 1 tablet (500 mg total) by mouth 3 (three) times a day as needed (take with sumatriptan). 30 tablet 3     ondansetron (ZOFRAN) 4 MG tablet Take 1 tablet (4 mg total) by mouth daily as needed for nausea. 30 tablet 1     SUMAtriptan (IMITREX) 50 MG tablet Take 1 tablet (50 mg total) by mouth every 2 (two) hours as needed for migraine (max 200mg in 24hr period). 10 tablet 3     predniSONE (DELTASONE) 50 MG tablet Take 50 mg by mouth daily for 4 days. 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 A delay in compatible RBCs may occur.     Latex Swelling     Social History     Tobacco Use     Smoking status: Current Every Day Smoker     Packs/day: 0.10     Smokeless tobacco: Never Used     Tobacco comment: 4-5 daily   Substance Use Topics     Alcohol use: No     Drug use: No       Review and/or order of clinical lab tests:  Review and/or order of radiology tests:  Review and/or order of medicine tests:  Discussion  of test results with performing physician:  Decision to obtain old records and/or obtain history from someone other than the patient:  Review and summarization of old records and/or obtaining history from someone other than the patient and.or discussion of case with another health care provider:  Independent visualization of image, tracing or specimen itself:    Time:      Samson Ingram MD

## 2021-06-25 NOTE — PROGRESS NOTES
"1210 bonnie arrived A&Ox4 amublatory and stable, she had clinic appt with  today. Pt reports she is always very tired, sleeping most of the time, trying to work but feeling like she may need to take CHANA until her health improves. She has two children of her own (14 and 19) plus two foster children (12 and 14yrs old). She is frustrated and depressed that she has been so tired for so long. But she is also not a good candidate for PRBC transfusions per Izabel since she already has so many anti-bodies and concern that she may develop more with subsequent transfusions.  Bonnie was in infusion yesterday and was to have a dose of Feraheme, she reacted within minutes of the infusion starting. She states today that she had no additional issues after she left infusion.   She will have labs drawn, receive Venofer 200mg IVP today; also B-12 injection.  PIV w ease R FA; excellent blood return. Lab drawn and line easily flushed NS.  Unable to draw the fasting lab, she was not fasting. 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 She will also continue to receive her B-12 injections at her work after todays injection with infusion.   venofer 200mg IVP given as ordered, with NS 250mL @ 200mL/hr; pt tolerated w/o sxs adverse Rxn. She was monitored 30min post infusion and was stable. PIV dc'd, site covered w cottonball/coban. After cannula was removed pt stated the site was \"stinging, but it always happens\". No redness or edema to area noted.   Dr Burkett also wanted to make sure that Bonnie has an appt with OB/GYN as she recently had an appt but left without being seen. Pt states she saw an OB/GYN 3/13/19.  AVS reviewed and given to pt. Bonnie dc'd A&Ox4 ambulatory and stable, she is aware of her next infusion appointment.   "

## 2021-06-25 NOTE — PROGRESS NOTES
Pt came into infusion clinic for her Iron infusion as ordered. Pt also due for B12 injection. Pt states her fatigue is worsening. Swelling in left foot improving after being started on Prednisone by PCP. Medications explained to pt who verbalized understanding. IV patent throughout infusion. Pt tolerated IV push with no complications. Pt was monitored post infusion with no signs of a reaction. Pt left infusion clinic via ambulatory and will RTC as sched.

## 2021-06-25 NOTE — PROGRESS NOTES
Pt here for iron sucrose.  IV started with good blood return and iron sucrose given without complications.  IV flushed with NS and then pt observed for 30 minutes without any issues.  IV DC and site covered with gauze and coban.  Pt refused DC instructions and knows when to return. Pt left stable.

## 2021-07-03 NOTE — ADDENDUM NOTE
Addendum Note by Aj Landers MD at 11/16/2020  1:38 PM     Author: Aj Landers MD Service: -- Author Type: Physician    Filed: 11/16/2020  1:38 PM Encounter Date: 11/16/2020 Status: Signed    : Aj Landers MD (Physician)    Addended by: AJ LANDERS on: 11/16/2020 01:38 PM        Modules accepted: Orders

## 2021-07-03 NOTE — ADDENDUM NOTE
Addendum Note by Maricel Lowe PBT at 7/10/2018  1:16 PM     Author: Maricel Lowe PBT Service: -- Author Type:     Filed: 7/10/2018  1:16 PM Encounter Date: 7/9/2018 Status: Signed    : Maricel Lowe PBT ()    Addended by: MARICEL LOWE on: 7/10/2018 01:16 PM        Modules accepted: Orders

## 2021-07-06 ENCOUNTER — COMMUNICATION - HEALTHEAST (OUTPATIENT)
Dept: INTERNAL MEDICINE | Facility: CLINIC | Age: 41
End: 2021-07-06

## 2021-07-19 ENCOUNTER — TRANSFERRED RECORDS (OUTPATIENT)
Dept: HEALTH INFORMATION MANAGEMENT | Facility: CLINIC | Age: 41
End: 2021-07-19

## 2021-07-31 ENCOUNTER — HEALTH MAINTENANCE LETTER (OUTPATIENT)
Age: 41
End: 2021-07-31

## 2021-08-02 ENCOUNTER — TELEPHONE (OUTPATIENT)
Dept: INTERNAL MEDICINE | Facility: CLINIC | Age: 41
End: 2021-08-02

## 2021-08-02 NOTE — TELEPHONE ENCOUNTER
Reason for Call:  Form, our goal is to have forms completed with 72 hours, however, some forms may require a visit or additional information.    Type of letter, form or note:  FMLA    Who is the form from?: Patient    Where did the form come from: Patient or family brought in       What clinic location was the form placed at?:[facility]   Where the form was placed: PUT IN PROVIDER FOLDER FIRST FLOOR  Box/Folder    What number is listed as a contact on the form?:     PATIENT PHONE NUMBER, CALL WHEN FORM IS COMPLETED        Additional comments:     Call taken on 8/2/2021 at 9:04 AM by Diann Madrigal

## 2021-08-05 NOTE — TELEPHONE ENCOUNTER
Can patient message me with the specifics of how much leave she wants for her FMLA form?  I need information on days, time frequency and total duration so that I can accurately fill in the form

## 2021-08-06 ENCOUNTER — VIRTUAL VISIT (OUTPATIENT)
Dept: INTERNAL MEDICINE | Facility: CLINIC | Age: 41
End: 2021-08-06
Payer: COMMERCIAL

## 2021-08-06 DIAGNOSIS — Z20.822 ENCOUNTER FOR LABORATORY TESTING FOR COVID-19 VIRUS: ICD-10-CM

## 2021-08-06 DIAGNOSIS — D64.9 SEVERE ANEMIA: ICD-10-CM

## 2021-08-06 DIAGNOSIS — E05.00 GRAVES DISEASE: ICD-10-CM

## 2021-08-06 DIAGNOSIS — R11.0 NAUSEA: Primary | ICD-10-CM

## 2021-08-06 PROCEDURE — 99213 OFFICE O/P EST LOW 20 MIN: CPT | Mod: 95 | Performed by: INTERNAL MEDICINE

## 2021-08-06 RX ORDER — MEPERIDINE HYDROCHLORIDE 25 MG/ML
25 INJECTION INTRAMUSCULAR; INTRAVENOUS; SUBCUTANEOUS EVERY 30 MIN PRN
Status: CANCELLED | OUTPATIENT
Start: 2021-08-09

## 2021-08-06 RX ORDER — ONDANSETRON 4 MG/1
4 TABLET, FILM COATED ORAL
COMMUNITY
Start: 2020-06-19 | End: 2021-10-19

## 2021-08-06 RX ORDER — CODEINE PHOSPHATE AND GUAIFENESIN 10; 100 MG/5ML; MG/5ML
LIQUID ORAL EVERY 6 HOURS PRN
COMMUNITY
Start: 2020-11-16 | End: 2021-10-29

## 2021-08-06 RX ORDER — NICOTINE 21 MG/24HR
1 PATCH, TRANSDERMAL 24 HOURS TRANSDERMAL
COMMUNITY
Start: 2021-02-19 | End: 2021-10-29

## 2021-08-06 RX ORDER — HEPARIN SODIUM,PORCINE 10 UNIT/ML
5 VIAL (ML) INTRAVENOUS
Status: CANCELLED | OUTPATIENT
Start: 2021-08-09

## 2021-08-06 RX ORDER — ALBUTEROL SULFATE 90 UG/1
1-2 AEROSOL, METERED RESPIRATORY (INHALATION)
Status: CANCELLED
Start: 2021-08-09

## 2021-08-06 RX ORDER — LEVOTHYROXINE SODIUM 137 UG/1
TABLET ORAL
COMMUNITY
Start: 2021-04-30 | End: 2021-10-19

## 2021-08-06 RX ORDER — DIPHENHYDRAMINE HYDROCHLORIDE 50 MG/ML
50 INJECTION INTRAMUSCULAR; INTRAVENOUS
Status: CANCELLED
Start: 2021-08-09

## 2021-08-06 RX ORDER — METHOCARBAMOL 500 MG/1
500 TABLET, FILM COATED ORAL
COMMUNITY
Start: 2021-05-18 | End: 2021-10-29

## 2021-08-06 RX ORDER — HEPARIN SODIUM (PORCINE) LOCK FLUSH IV SOLN 100 UNIT/ML 100 UNIT/ML
5 SOLUTION INTRAVENOUS
Status: CANCELLED | OUTPATIENT
Start: 2021-08-09

## 2021-08-06 RX ORDER — NALOXONE HYDROCHLORIDE 0.4 MG/ML
0.2 INJECTION, SOLUTION INTRAMUSCULAR; INTRAVENOUS; SUBCUTANEOUS
Status: CANCELLED | OUTPATIENT
Start: 2021-08-09

## 2021-08-06 RX ORDER — PROPRANOLOL HYDROCHLORIDE 20 MG/1
10 TABLET ORAL
COMMUNITY
Start: 2020-06-23 | End: 2021-10-29

## 2021-08-06 RX ORDER — ONDANSETRON 4 MG/1
4 TABLET, FILM COATED ORAL EVERY 8 HOURS PRN
Qty: 30 TABLET | Refills: 1 | Status: SHIPPED | OUTPATIENT
Start: 2021-08-06 | End: 2021-10-29

## 2021-08-06 RX ORDER — AMOXICILLIN AND CLAVULANATE POTASSIUM 500; 125 MG/1; MG/1
TABLET, FILM COATED ORAL
COMMUNITY
Start: 2020-12-27 | End: 2021-10-19

## 2021-08-06 RX ORDER — EPINEPHRINE 1 MG/ML
0.3 INJECTION, SOLUTION, CONCENTRATE INTRAVENOUS EVERY 5 MIN PRN
Status: CANCELLED | OUTPATIENT
Start: 2021-08-09

## 2021-08-06 RX ORDER — PREDNISONE 20 MG/1
TABLET ORAL
COMMUNITY
Start: 2021-03-01 | End: 2021-10-19

## 2021-08-06 RX ORDER — CYANOCOBALAMIN 1000 UG/ML
1000 INJECTION, SOLUTION INTRAMUSCULAR; SUBCUTANEOUS
COMMUNITY
Start: 2020-06-29 | End: 2021-10-19

## 2021-08-06 RX ORDER — METHYLPREDNISOLONE SODIUM SUCCINATE 125 MG/2ML
125 INJECTION, POWDER, LYOPHILIZED, FOR SOLUTION INTRAMUSCULAR; INTRAVENOUS
Status: CANCELLED
Start: 2021-08-09

## 2021-08-06 RX ORDER — ALBUTEROL SULFATE 0.83 MG/ML
2.5 SOLUTION RESPIRATORY (INHALATION)
Status: CANCELLED | OUTPATIENT
Start: 2021-08-09

## 2021-08-06 NOTE — PROGRESS NOTES
Bonnie is a 40 year old who is being evaluated via a billable telephone visit.      What phone number would you like to be contacted at? [phone]  How would you like to obtain your AVS? Joanne    Assess/plan  1. Nausea    - ondansetron (ZOFRAN) 4 MG tablet; Take 1 tablet (4 mg) by mouth every 8 hours as needed for nausea  Dispense: 30 tablet; Refill: 1    2. Graves disease  She has a standing order for TSH.  She should get that done now been about 5 weeks since she started the 175 mcg of thyroxine.  She was not aware that it was a standing order.  This will just tell us if she has responded and is compliant.    3. Severe anemia  We will put in the smart set for her iron deficiency and start her on Venofer.  She is allergic to feroxymytol with back pain as a side effect   But I do not believe that that is true  Anaphylaxis or a contraindication   And the orders are in place in case she has issues.  4. Encounter for laboratory testing for COVID-19 virus  She needs the Covid testing before the infusions.  - Asymptomatic COVID-19 Virus (Coronavirus) by PCR; Future      Subjective   Bonnie is a 40 year old who presents for the following health issues     HPI   Patient has a history of major depression, severe iron deficiency anemia Graves' disease followed by post ablative hypothyroidism.  Poor compliance of medications nausea inability to eat lack of appetite.  Currently is compliant with her thyroid pill and is feeling a little better but still not able to eat normally.        Review of Systems         Objective           Vitals:  No vitals were obtained today due to virtual visit.    Physical Exam     PSYCH: Alert and oriented times 3; coherent speech, normal   rate and volume, able to articulate logical thoughts, able   to abstract reason, no tangential thoughts, no hallucinations   or delusions  Her affect is fair  RESP: No cough, no audible wheezing, able to talk in full sentences  Remainder of exam unable to be  completed due to telephone visits                Phone call duration:15 minutes

## 2021-08-26 ENCOUNTER — MYC MEDICAL ADVICE (OUTPATIENT)
Dept: INTERNAL MEDICINE | Facility: CLINIC | Age: 41
End: 2021-08-26

## 2021-09-03 NOTE — PROGRESS NOTES
Pt is a 71 yo male with a pmh/o HTN, HLD, skin CA admitted for:       #Acute hypoxic respiratory Failure  secondary to COVID 19 PNA,  superimposed E.COLI PNA, also suspected underline mass and Mediastinal LAD  - CTA chest: neg for PE, extensive pulm infiltrates, with RLL nodularity and mediastinal mass, possible Lung  malignancy   - Maintain on airborne isolation.  - Continue with O2 via HF nasal cannula,  45LPM/45%,      keep O2 sats >92%  - continuous pulse ox  - Acetaminophen 650 mg PO q4h PRN fever  - HFA albuterol Q6 hour PRN   - C/w  Dexamethasone & Remdesivir Day # 9  - C/w Ceftriaxone IV day 5  - trend proinflammatory markers   - will need f/u CT to follow up on infiltrates and possible mass  in 4-6 weeks     # Hyperglycemia  - HB A1c 6.3, pre diabetes   - BS elevated 2/2 dexamethasone  - Monitor BS, cover with ISS, on low dose Lantus while on steroids     #Hypokalemia  -repleted   - monitor     #HTN  - monitor BP, stable  - on amlodipine     # Constipation  - CT abd neg for obstruction  - bowel regiment    #  Coagulase negative Staph Bacteremia.    BCX x 1 set:  + Coagulase neg staph, likely contamination   repeat  Cx: NGTD  C/w IV  Rocephin  for sputum ECOLI coverage   D/w DR Oshea    # Acute LLE below the knee DVT  - as pt is high risk and cant r/o PE ( Pt is on HFNC) c/w  Full dose A/c  - Lovenox 90mg SQ BID    #Skin CA  Pt may use own 5FU cream topically as directed    # Constipation  Bowel regimen   had 2 BMs          Spoke with patient  I told her she will benefit from iron transfusion    Sam Roth MD  Internal medicine  [facility]  [phone]  [email or web address]

## 2021-09-23 ENCOUNTER — ALLIED HEALTH/NURSE VISIT (OUTPATIENT)
Dept: FAMILY MEDICINE | Facility: CLINIC | Age: 41
End: 2021-09-23
Payer: COMMERCIAL

## 2021-09-23 DIAGNOSIS — Z23 ENCOUNTER FOR IMMUNIZATION: Primary | ICD-10-CM

## 2021-09-23 PROCEDURE — 91300 PR COVID VAC PFIZER DIL RECON 30 MCG/0.3 ML IM: CPT | Performed by: FAMILY MEDICINE

## 2021-09-23 PROCEDURE — 0001A PR COVID VAC PFIZER DIL RECON 30 MCG/0.3 ML IM: CPT | Performed by: FAMILY MEDICINE

## 2021-09-25 ENCOUNTER — HEALTH MAINTENANCE LETTER (OUTPATIENT)
Age: 41
End: 2021-09-25

## 2021-10-15 ENCOUNTER — ALLIED HEALTH/NURSE VISIT (OUTPATIENT)
Dept: FAMILY MEDICINE | Facility: CLINIC | Age: 41
End: 2021-10-15
Payer: COMMERCIAL

## 2021-10-15 DIAGNOSIS — Z23 IMMUNIZATION DUE: Primary | ICD-10-CM

## 2021-10-15 PROCEDURE — 91300 PR COVID VAC PFIZER DIL RECON 30 MCG/0.3 ML IM: CPT | Performed by: FAMILY MEDICINE

## 2021-10-15 PROCEDURE — 0002A PR COVID VAC PFIZER DIL RECON 30 MCG/0.3 ML IM: CPT | Performed by: FAMILY MEDICINE

## 2021-10-19 ENCOUNTER — VIRTUAL VISIT (OUTPATIENT)
Dept: INTERNAL MEDICINE | Facility: CLINIC | Age: 41
End: 2021-10-19
Payer: COMMERCIAL

## 2021-10-19 DIAGNOSIS — E06.3 HASHIMOTO'S DISEASE: ICD-10-CM

## 2021-10-19 DIAGNOSIS — F32.9 MAJOR DEPRESSIVE DISORDER, REMISSION STATUS UNSPECIFIED, UNSPECIFIED WHETHER RECURRENT: Primary | ICD-10-CM

## 2021-10-19 DIAGNOSIS — D64.9 SEVERE ANEMIA: ICD-10-CM

## 2021-10-19 PROCEDURE — 99214 OFFICE O/P EST MOD 30 MIN: CPT | Mod: 95 | Performed by: INTERNAL MEDICINE

## 2021-10-19 RX ORDER — LEVOTHYROXINE SODIUM 137 UG/1
137 TABLET ORAL DAILY
COMMUNITY
Start: 2021-09-07 | End: 2023-04-25

## 2021-10-19 RX ORDER — LEVOTHYROXINE SODIUM 137 UG/1
137 TABLET ORAL DAILY
Qty: 90 TABLET | Refills: 3 | Status: SHIPPED | OUTPATIENT
Start: 2021-10-19 | End: 2021-10-19

## 2021-10-19 NOTE — PROGRESS NOTES
Bonnie is a 41 year old who is being evaluated via a billable video visit.      How would you like to obtain your AVS? MyChart  If the video visit is dropped, the invitation should be resent by: Text to cell phone: [phone]  Will anyone else be joining your video visit? No    Video Start Time: 1 pm   Assess/plan  1. Major depressive disorder, remission status unspecified, unspecified whether recurrent    - sertra restart Zoloft.  She is seeing me in 2 weeks.  Line (ZOLOFT) 50 MG tablet; Take 1 tablet (50 mg) by mouth daily  Dispense: 90 tablet; Refill: 1  - MENTAL HEALTH REFERRAL  - Adult; Outpatient Treatment; Individual/Couples/Family/Group Therapy/Health Psychology; [facility] [phone]; We will contact you to schedule the appointment or please call with any questions; Future    2. Hashimoto's disease  Severe hypothyroidism in the context of post Graves' disease.  Ablative treatment.  She is not initially was not compliant with her thyroxine but she is still taking 175 mcg a day.  We will check with thyroid standing order was done she has not had it a timely fashion with recommend strongly that she continue to focus on her physical health for her mental health improved.    3. Severe anemia  We will give her 1 month complete FMLA until she sorts out her anemia or thyroid problems and her mental health.  I did  her that she has to focus on her own mental and physical health.  She is to learn to get back and working in the working environment.  And improving her health will help facilitate that.  I do believe she wants to work this can be productive if her health issues were in control.        Subjective   Bonnie is a 41 year old who presents for the following health issues worsening depression , feels like her 'life is falling apart' . Lost her car, last apartment her lease was not renewed due to the behavior of her daughter who is now in juvenile intermediate.  So she has been staying  in hotels .   Was asked to leave her workplace by her manager because she was crying . She had been put on FMLA with reduced schedule but was not able to get in time.  She has been compliant with her thyroxine but has not had her iron infusions    She denies suicidal ideation    HPI       Current Outpatient Medications   Medication     levothyroxine (SYNTHROID/LEVOTHROID) 175 MCG tablet     methocarbamol (ROBAXIN) 500 MG tablet     nicotine (NICODERM CQ) 21 MG/24HR 24 hr patch     ondansetron (ZOFRAN) 4 MG tablet     propranolol (INDERAL) 20 MG tablet         VIRTUSSIN A/C 100-10 MG/5ML solution     No current facility-administered medications for this visit.         Review of Systems   Constitutional, HEENT, cardiovascular, pulmonary, gi and gu systems are negative, except as otherwise noted.      Objective           Vitals:  No vitals were obtained today due to virtual visit.    Physical Exam   GENERAL: Healthy, alert and no distress  EYES: Eyes grossly normal to inspection.  No discharge or erythema, or obvious scleral/conjunctival abnormalities.  RESP: No audible wheeze, cough, or visible cyanosis.  No visible retractions or increased work of breathing.    SKIN: Visible skin clear. No significant rash, abnormal pigmentation or lesions.  NEURO: Cranial nerves grossly intact.  Mentation and speech appropriate for age.  PSYCH: Mentation appears normal, affect normal/bright, judgement and insight intact, normal speech and appearance well-groomed.                Video-Visit Details    Type of service:  Video Visit    Video End Time:120 pm     Originating Location (pt. Location): Home    Distant Location (provider location):  [facility]     Platform used for Video Visit: Nithin

## 2021-10-19 NOTE — LETTER
October 19, 2021      Bonnie Webster  [address]  [place] [address]        To Whom It May Concern:    Bonnie Webster  was seen on 10/19/2021  Please excuse her  until November 12 2021 .  She is currently suffering from unresolved medical issues and is unable to complete her work duties at this time.  Complete time off work till her issues are more  manageable is recommended.  She will be reassessed in 2 to 3 weeks.    Please contact me for further information,    Sincerely,        Rolanda Xiong MD

## 2021-10-25 ENCOUNTER — IMMUNIZATION (OUTPATIENT)
Dept: FAMILY MEDICINE | Facility: CLINIC | Age: 41
End: 2021-10-25
Payer: COMMERCIAL

## 2021-10-25 PROCEDURE — 90471 IMMUNIZATION ADMIN: CPT

## 2021-10-25 PROCEDURE — 90686 IIV4 VACC NO PRSV 0.5 ML IM: CPT

## 2021-10-29 ENCOUNTER — OFFICE VISIT (OUTPATIENT)
Dept: INTERNAL MEDICINE | Facility: CLINIC | Age: 41
End: 2021-10-29
Payer: COMMERCIAL

## 2021-10-29 VITALS
DIASTOLIC BLOOD PRESSURE: 74 MMHG | HEART RATE: 88 BPM | SYSTOLIC BLOOD PRESSURE: 112 MMHG | BODY MASS INDEX: 21.21 KG/M2 | OXYGEN SATURATION: 100 % | WEIGHT: 139.5 LBS

## 2021-10-29 DIAGNOSIS — D64.9 SEVERE ANEMIA: ICD-10-CM

## 2021-10-29 DIAGNOSIS — E06.3 HASHIMOTO'S DISEASE: ICD-10-CM

## 2021-10-29 DIAGNOSIS — Z00.00 ROUTINE HISTORY AND PHYSICAL EXAMINATION OF ADULT: Primary | ICD-10-CM

## 2021-10-29 DIAGNOSIS — E05.00 GRAVES DISEASE: ICD-10-CM

## 2021-10-29 DIAGNOSIS — Z00.00 HEALTH MAINTENANCE EXAMINATION: ICD-10-CM

## 2021-10-29 PROCEDURE — 99396 PREV VISIT EST AGE 40-64: CPT | Performed by: INTERNAL MEDICINE

## 2021-10-29 ASSESSMENT — PATIENT HEALTH QUESTIONNAIRE - PHQ9
SUM OF ALL RESPONSES TO PHQ QUESTIONS 1-9: 18
10. IF YOU CHECKED OFF ANY PROBLEMS, HOW DIFFICULT HAVE THESE PROBLEMS MADE IT FOR YOU TO DO YOUR WORK, TAKE CARE OF THINGS AT HOME, OR GET ALONG WITH OTHER PEOPLE: EXTREMELY DIFFICULT
SUM OF ALL RESPONSES TO PHQ QUESTIONS 1-9: 18

## 2021-10-29 NOTE — PROGRESS NOTES
SUBJECTIVE:   CC: oBnnie Webster is an 41 year old woman who presents for preventive health visit.     Patient has been advised of split billing requirements and indicates understanding: Yes  Healthy Habits:     Getting at least 3 servings of Calcium per day:  NO    Bi-annual eye exam:  Yes    Dental care twice a year:  NO    Sleep apnea or symptoms of sleep apnea:  None    Diet:  Regular (no restrictions)    Frequency of exercise:  None    Taking medications regularly:  Yes    Medication side effects:  None    PHQ-2 Total Score: 4    Additional concerns today:  No      Patient Active Problem List   Diagnosis     Graves disease     Hashimoto's disease     Cannabis abuse     Exophthalmos of both eyes     Hypokalemia     Severe anemia     Adjustment disorder with mixed anxiety and depressed mood     Health maintenance examination     Current Outpatient Medications   Medication     levothyroxine (SYNTHROID/LEVOTHROID) 175 MCG tablet     sertraline (ZOLOFT) 50 MG tablet     No current facility-administered medications for this visit.             Today's PHQ-2 Score:   PHQ-2 (  [employer]) 10/29/2021   Q1: Little interest or pleasure in doing things 2   Q2: Feeling down, depressed or hopeless 2   PHQ-2 Score 4   Q1: Little interest or pleasure in doing things More than half the days   Q2: Feeling down, depressed or hopeless More than half the days   PHQ-2 Score 4       Abuse: Current or Past (Physical, Sexual or Emotional) no  Do you feel safe in your environment? Yes    Have you ever done Advance Care Planning? no (For example, a Health Directive, POLST, or a discussion with a medical provider or your loved ones about your wishes):     Social History     Tobacco Use     Smoking status: Former Smoker     Packs/day: 0.10     Types: Cigarettes     Quit date: 2021     Years since quittin.7     Smokeless tobacco: Never Used     Tobacco comment: 4-5 daily   Substance Use Topics     Alcohol use: No         Alcohol  Use 10/29/2021   Prescreen: >3 drinks/day or >7 drinks/week? No       Reviewed orders with patient.  Reviewed health maintenance and updated orders accordingly - Yes      Breast Cancer Screening:  Any new diagnosis of family breast, ovarian, or bowel cancer? No    FHS-7: No flowsheet data found.    Mammogram Screening - Offered annual screening and updated Health Maintenance for mutual plan based on risk factor consideration    Pertinent mammograms are reviewed under the imaging tab.    History of abnormal Pap smear: NO - age 30-65 PAP every 5 years with negative HPV co-testing recommended     Reviewed and updated as needed this visit by clinical staff   Allergies  Meds              Reviewed and updated as needed this visit by Provider                Past Medical History:   Diagnosis Date     Adjustment disorder with mixed anxiety and depressed mood 2019     Anemia      B12 deficiency anemia      Cannabis abuse 2020     Chronic infection 2017    MRSA     Exophthalmos of both eyes 2020     Graves disease 2020     Hashimoto's disease 2020     History of blood transfusion      Hypokalemia 2020     Severe anemia 2018      Past Surgical History:   Procedure Laterality Date     BONE MARROW BIOPSY       BONE MARROW BIOPSY, BONE SPECIMEN, NEEDLE/TROCAR       C/SECTION, CLASSICAL      x2      SECTION      x2     ORIF METACARPAL FRACTURE Left 2013     ORTHOPEDIC SURGERY Left 2013    ORIF METECARPAL      THYROID BIOPSY  10/2020     THYROIDECTOMY N/A 2021    Procedure: TOTAL THYROIDECTOMY;  Surgeon: Stefani Perez MD;  Location:  OR     US BIOPSY THYROID FINE NEEDLE ASPIRATION  10/21/2020       Review of Systems  CONSTITUTIONAL: NEGATIVE for fever, chills, change in weight  INTEGUMENTARU/SKIN: NEGATIVE for worrisome rashes, moles or lesions  EYES: NEGATIVE for vision changes or irritation  ENT: NEGATIVE for ear, mouth and throat problems  RESP: NEGATIVE for significant cough or  SOB  BREAST: NEGATIVE for masses, tenderness or discharge  CV: NEGATIVE for chest pain, palpitations or peripheral edema  GI: NEGATIVE for nausea, abdominal pain, heartburn, or change in bowel habits  : NEGATIVE for unusual urinary or vaginal symptoms. Periods are regular.  MUSCULOSKELETAL: NEGATIVE for significant arthralgias or myalgia  NEURO: NEGATIVE for weakness, dizziness or paresthesias  PSYCHIATRIC: NEGATIVE for changes in mood or affect     OBJECTIVE:   /74 (BP Location: Left arm, Patient Position: Sitting, Cuff Size: Adult Regular)   Pulse 88   Wt 63.3 kg (139 lb 8 oz)   SpO2 100%   BMI 21.21 kg/m    Physical Exam  GENERAL: healthy, alert and no distress  EYES: Eyes grossly normal to inspection, PERRL and conjunctivae and sclerae normal  HENT: ear canals and TM's normal, nose and mouth without ulcers or lesions  NECK: no adenopathy, no asymmetry, masses, or scars and thyroidectomy scar palpable   RESP: lungs clear to auscultation - no rales, rhonchi or wheezes  BREAST: normal without masses, tenderness or nipple discharge and no palpable axillary masses or adenopathy  CV: regular rate and rhythm, normal S1 S2, no S3 or S4, no murmur, click or rub, no peripheral edema and peripheral pulses strong  ABDOMEN: soft, nontender, no hepatosplenomegaly, no masses and bowel sounds normal  MS: no gross musculoskeletal defects noted, no edema  SKIN: no suspicious lesions or rashes  NEURO: Normal strength and tone, mentation intact and speech normal  PSYCH: mentation appears normal, affect normal/bright        ASSESSMENT/PLAN:   (Z00.00) Routine history and physical examination of adult  (primary encounter diagnosis)  Comment:  She does qualify for early colon cancer screening   She wants to defer till age 44  She is up todate [facility] paps ( [facility])   Plan: CBC with platelets, TSH, Comprehensive         metabolic panel, Fecal colorectal cancer screen        (FIT), Lipid panel reflex to direct LDL          "Fasting, Hemoglobin A1c, *MA Screening Digital         Bilateral            (Z00.00) Health maintenance examination  Comment:   Plan:     (E05.00) Graves disease  Comment: has both graves , hashimotos and postsurgical hyothyroidism . Her medication use has not been consistent   Due to lack of transport and other issues in her life she has not been going for the iron infusions for her severe iron deficiency anemia or tsh testing . She agreed to do testing today but then declined for unclear reasons to go to the lab  when the CMA tried to assist in taking her there   Hopefull she will do testing at her next visit . I am unable to continue extending FMLA unless she uses it for her health care needs    Plan:   (E06.3) Hashimoto's disease  Comment:   Plan:     (D64.9) Severe anemia  Comment:   Plan:   Given her current unstable housing situation and depression as well , I did give her FMLA for a month so she can pursue her health care needs .   She will not improve her mental health or physical health unless she goes for iron infusions and gets her thyroid function normalized   She is aware of the dangers of untreated hypothyroidism   Patient has been advised of split billing requirements and indicates understanding: Yes  COUNSELING:  Reviewed preventive health counseling, as reflected in patient instructions    Estimated body mass index is 21.21 kg/m  as calculated from the following:    Height as of 2/2/21: 1.727 m (5' 8\").    Weight as of this encounter: 63.3 kg (139 lb 8 oz).        She reports that she quit smoking about 9 months ago. Her smoking use included cigarettes. She smoked 0.10 packs per day. She has never used smokeless tobacco.      Counseling Resources:  ATP IV Guidelines  Pooled Cohorts Equation Calculator  Breast Cancer Risk Calculator  BRCA-Related Cancer Risk Assessment: FHS-7 Tool  FRAX Risk Assessment  ICSI Preventive Guidelines  Dietary Guidelines for Americans, 2010  USDA's MyPlate  ASA " Prophylaxis  Lung CA Screening    Rolanda Xiong MD  [facility]  Answers for HPI/ROS submitted by the patient on 10/29/2021  If you checked off any problems, how difficult have these problems made it for you to do your work, take care of things at home, or get along with other people?: Extremely difficult  PHQ9 TOTAL SCORE: 18

## 2021-10-30 ASSESSMENT — PATIENT HEALTH QUESTIONNAIRE - PHQ9: SUM OF ALL RESPONSES TO PHQ QUESTIONS 1-9: 18

## 2021-11-15 ENCOUNTER — TELEPHONE (OUTPATIENT)
Dept: INTERNAL MEDICINE | Facility: CLINIC | Age: 41
End: 2021-11-15
Payer: COMMERCIAL

## 2021-11-15 NOTE — TELEPHONE ENCOUNTER
1. ..What is the nature of the form? FMLA    2. Has patient/parent signed form if applicable? Yes    3. Where should completed form go? Fax to [phone]    4. When was patient's last appointment with PCP? Within one year    5. If further questions or when form is completed, is it okay to leave detailed message on patient's phone? YES    Pt would like paper work faxed over to provided fax number and also would like to pick original up ok to call when done

## 2021-11-18 NOTE — TELEPHONE ENCOUNTER
Called patient and notified patient that the form was completed and faxed out. Patient will  a copy. Left with  registrars.     Copy in faxed out bin. Will send out to medical records after 3 weeks.

## 2022-03-12 ENCOUNTER — HEALTH MAINTENANCE LETTER (OUTPATIENT)
Age: 42
End: 2022-03-12

## 2022-07-13 ENCOUNTER — TELEPHONE (OUTPATIENT)
Dept: INFUSION THERAPY | Facility: HOSPITAL | Age: 42
End: 2022-07-13

## 2022-07-13 NOTE — TELEPHONE ENCOUNTER
Pt called into the [facility] to schedule some Iron Infusion appts.  When looking into the patient's chart, there wasn't a Therapy Plan in place to be able to schedule. I tried to tell the patient that there wasn't any orders so I could schedule. She proceeded to tell me that they've been there for a while.  Tried to explain that since we became [facility] that we now have to have Therapy Plans entered before we can schedule, but she didn't give me the chance.  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  I told her again that I couldn't schedule till I have the orders, which I couldn't finish as she cut me off asking if I was denying her care? I told her no, I'm just explaining that you need to reach out to your MD and have them put a Therapy Plan in. She said no, they're in. That's fine. I'm calling [employer] and disconnected the call.     Looking into the pt's past appointments, she was scheduled in September of 2021 for all 5 iron infusions and NO SHOWED each one of them. The provider was notified via IB in January of 2022 about the pt not being able to be reached to reschedule. At that time the orders were cancelled.

## 2022-09-14 ENCOUNTER — ANCILLARY PROCEDURE (OUTPATIENT)
Dept: GENERAL RADIOLOGY | Facility: CLINIC | Age: 42
End: 2022-09-14
Attending: FAMILY MEDICINE
Payer: COMMERCIAL

## 2022-09-14 DIAGNOSIS — M79.645 PAIN OF FINGER OF LEFT HAND: ICD-10-CM

## 2022-09-14 DIAGNOSIS — M79.645 PAIN OF FINGER OF LEFT HAND: Primary | ICD-10-CM

## 2022-09-14 PROCEDURE — 73110 X-RAY EXAM OF WRIST: CPT | Mod: TC | Performed by: RADIOLOGY

## 2022-10-24 ENCOUNTER — OFFICE VISIT (OUTPATIENT)
Dept: FAMILY MEDICINE | Facility: CLINIC | Age: 42
End: 2022-10-24
Payer: COMMERCIAL

## 2022-10-24 ENCOUNTER — HOSPITAL ENCOUNTER (EMERGENCY)
Facility: CLINIC | Age: 42
End: 2022-10-24
Payer: COMMERCIAL

## 2022-10-24 VITALS
DIASTOLIC BLOOD PRESSURE: 70 MMHG | BODY MASS INDEX: 21.91 KG/M2 | OXYGEN SATURATION: 100 % | SYSTOLIC BLOOD PRESSURE: 103 MMHG | RESPIRATION RATE: 16 BRPM | WEIGHT: 144.1 LBS | TEMPERATURE: 98.5 F | HEART RATE: 80 BPM

## 2022-10-24 DIAGNOSIS — N93.9 VAGINAL BLEEDING: ICD-10-CM

## 2022-10-24 DIAGNOSIS — R42 DIZZINESS: ICD-10-CM

## 2022-10-24 DIAGNOSIS — D62 ANEMIA DUE TO BLOOD LOSS, ACUTE: Primary | ICD-10-CM

## 2022-10-24 DIAGNOSIS — R06.02 SHORTNESS OF BREATH: ICD-10-CM

## 2022-10-24 DIAGNOSIS — Z86.2 HISTORY OF ANEMIA: ICD-10-CM

## 2022-10-24 LAB
ATRIAL RATE - MUSE: 77 BPM
BASOPHILS # BLD AUTO: 0 10E3/UL (ref 0–0.2)
BASOPHILS NFR BLD AUTO: 0 %
DIASTOLIC BLOOD PRESSURE - MUSE: NORMAL MMHG
EOSINOPHIL # BLD AUTO: 0.1 10E3/UL (ref 0–0.7)
EOSINOPHIL NFR BLD AUTO: 1 %
ERYTHROCYTE [DISTWIDTH] IN BLOOD BY AUTOMATED COUNT: 24.1 % (ref 10–15)
HCT VFR BLD AUTO: 17.1 % (ref 35–47)
HGB BLD-MCNC: 4.8 G/DL (ref 11.7–15.7)
HOLD SPECIMEN: NORMAL
IMM GRANULOCYTES # BLD: 0 10E3/UL
IMM GRANULOCYTES NFR BLD: 0 %
INTERPRETATION ECG - MUSE: NORMAL
LYMPHOCYTES # BLD AUTO: 1.3 10E3/UL (ref 0.8–5.3)
LYMPHOCYTES NFR BLD AUTO: 26 %
MCH RBC QN AUTO: 20.4 PG (ref 26.5–33)
MCHC RBC AUTO-ENTMCNC: 28.1 G/DL (ref 31.5–36.5)
MCV RBC AUTO: 73 FL (ref 78–100)
MONOCYTES # BLD AUTO: 0.4 10E3/UL (ref 0–1.3)
MONOCYTES NFR BLD AUTO: 8 %
NEUTROPHILS # BLD AUTO: 3.3 10E3/UL (ref 1.6–8.3)
NEUTROPHILS NFR BLD AUTO: 65 %
NRBC # BLD AUTO: 0 10E3/UL
NRBC BLD AUTO-RTO: 0 /100
P AXIS - MUSE: 45 DEGREES
PLATELET # BLD AUTO: 223 10E3/UL (ref 150–450)
PR INTERVAL - MUSE: 138 MS
QRS DURATION - MUSE: 84 MS
QT - MUSE: 390 MS
QTC - MUSE: 441 MS
R AXIS - MUSE: 66 DEGREES
RBC # BLD AUTO: 2.35 10E6/UL (ref 3.8–5.2)
SYSTOLIC BLOOD PRESSURE - MUSE: NORMAL MMHG
T AXIS - MUSE: 56 DEGREES
VENTRICULAR RATE- MUSE: 77 BPM
WBC # BLD AUTO: 5.1 10E3/UL (ref 4–11)

## 2022-10-24 PROCEDURE — 99215 OFFICE O/P EST HI 40 MIN: CPT | Performed by: NURSE PRACTITIONER

## 2022-10-24 PROCEDURE — 85025 COMPLETE CBC W/AUTO DIFF WBC: CPT | Performed by: NURSE PRACTITIONER

## 2022-10-24 PROCEDURE — 93010 ELECTROCARDIOGRAM REPORT: CPT | Performed by: INTERNAL MEDICINE

## 2022-10-24 PROCEDURE — 93005 ELECTROCARDIOGRAM TRACING: CPT | Performed by: NURSE PRACTITIONER

## 2022-10-24 PROCEDURE — 36415 COLL VENOUS BLD VENIPUNCTURE: CPT | Performed by: NURSE PRACTITIONER

## 2022-10-24 ASSESSMENT — ENCOUNTER SYMPTOMS
SHORTNESS OF BREATH: 1
LIGHT-HEADEDNESS: 1
CHILLS: 0
FEVER: 0
COUGH: 0
VOMITING: 0

## 2022-10-24 NOTE — ED NOTES
Expected Patient Referral to ED  4:12 PM    Referring Clinic/Provider:  Walk in clinic [place]     Reason for referral/Clinical facts:  PMhx anemia, iron transfusions remotely.  HGB 4.7.  Vaginal bleeding.  ECG NSR.  Bps around 100.  Coming via private vehicle.   driving.    Recommendations provided:  Send to ED for further evaluation    Caller was informed that this institution does possess the capabilities and/or resources to provide for patient and should be transferred to our facility.    Discussed that if direct admit is sought and any hurdles are encountered, this ED would be happy to see the patient and evaluate.    Informed caller that recommendations provided are recommendations based only on the facts provided and that they responsible to accept or reject the advice, or to seek a formal in person consultation as needed and that this ED will see/treat patient should they arrive.      Irineo Randolph MD  [facility] EMERGENCY ROOM  [address] [address]  [phone]       Irineo Randolph MD  10/24/22 [id number]

## 2022-10-24 NOTE — PROGRESS NOTES
Assessment & Plan     Dizziness    - EKG 12-lead, tracing only  - CBC with platelets and differential  - CBC with platelets and differential    History of anemia    - CBC with platelets and differential  - CBC with platelets and differential    Shortness of breath    - CBC with platelets and differential  - CBC with platelets and differential    Anemia due to blood loss, acute      Vaginal bleeding       Patient with signs of anemia including pale skin, heavy vaginal bleeding, shortness of breath, lightheadedness with history of anemia.     Hemoglobin reported to me by lab was 4.7.    Patient would like to go to [facility] due to over capacity status at [facility].  Is going to have her his significant other take her over there.    Spoke with Dr. Jensen in the ED at [facility].              No follow-ups on file.    [facility] is a 42 year old female who presents to clinic today for the following health issues:  Chief Complaint   Patient presents with     Chest Pain     4 Day chest pain SOB.cold and headache,dizzy      HPI    Patient with a history of anemia with history of blood and iron transfusions in the past with weakness, chills, dizziness with standing.  Has had 8 days of heavy menses.      Short of breath with minimal exertion.    Has been laying in bed most of the weekend due to symptoms.    Orthostatics done 99/64 lying and 97/64 standing.         Review of Systems   Constitutional: Negative for chills and fever.   HENT: Negative for congestion.    Respiratory: Positive for shortness of breath. Negative for cough.    Gastrointestinal: Negative for vomiting.   Genitourinary: Positive for vaginal bleeding.   Neurological: Positive for light-headedness.           Objective    /70 (BP Location: Right arm, Patient Position: Sitting, Cuff Size: Adult Regular)   Pulse 80   Temp 98.5  F (36.9  C) (Oral)   Resp 16   Wt 65.4  kg (144 lb 1.6 oz)   LMP 10/20/2022 (Exact Date)   SpO2 100%   BMI 21.91 kg/m    Physical Exam  Constitutional:       Appearance: Normal appearance. She is ill-appearing.   Pulmonary:      Effort: Pulmonary effort is normal.   Skin:     Coloration: Skin is pale.   Neurological:      Mental Status: She is alert.   Psychiatric:         Mood and Affect: Mood normal.            Results for orders placed or performed in visit on 10/24/22 (from the past 24 hour(s))   EKG 12-lead, tracing only   Result Value Ref Range    Systolic Blood Pressure  mmHg    Diastolic Blood Pressure  mmHg    Ventricular Rate 77 BPM    Atrial Rate 77 BPM    NJ Interval 138 ms    QRS Duration 84 ms     ms    QTc 441 ms    P Axis 45 degrees    R AXIS 66 degrees    T Axis 56 degrees    Interpretation ECG       Sinus rhythm  Normal ECG  When compared with ECG of 18-JUN-2020 13:49,  Premature ventricular complexes are no longer Present  QRS voltage has decreased  Confirmed by NBA JENNINGS MD LOC:[facility] ([id number]) on 10/24/2022 3:57:32 PM     CBC with platelets and differential    Narrative    The following orders were created for panel order CBC with platelets and differential.  Procedure                               Abnormality         Status                     ---------                               -----------         ------                     CBC with platelets and d...[[id number]]                      In process                   Please view results for these tests on the individual orders.   Extra Tube    Narrative    The following orders were created for panel order Extra Tube.  Procedure                               Abnormality         Status                     ---------                               -----------         ------                     Extra Red Top Tube[[id number]]                               In process                   Please view results for these tests on the individual orders.

## 2022-10-25 ENCOUNTER — HOSPITAL ENCOUNTER (OUTPATIENT)
Facility: CLINIC | Age: 42
Setting detail: OBSERVATION
Discharge: HOME | End: 2022-10-25
Attending: EMERGENCY MEDICINE | Admitting: EMERGENCY MEDICINE
Payer: COMMERCIAL

## 2022-10-25 VITALS
HEART RATE: 79 BPM | HEIGHT: 68 IN | WEIGHT: 144 LBS | TEMPERATURE: 97.5 F | OXYGEN SATURATION: 100 % | DIASTOLIC BLOOD PRESSURE: 82 MMHG | RESPIRATION RATE: 18 BRPM | BODY MASS INDEX: 21.82 KG/M2 | SYSTOLIC BLOOD PRESSURE: 125 MMHG

## 2022-10-25 DIAGNOSIS — D50.0 IRON DEFICIENCY ANEMIA DUE TO CHRONIC BLOOD LOSS: ICD-10-CM

## 2022-10-25 PROBLEM — N92.0 MENORRHAGIA: Status: ACTIVE | Noted: 2022-10-25

## 2022-10-25 PROBLEM — D64.9 ANEMIA REQUIRING TRANSFUSIONS: Status: ACTIVE | Noted: 2022-10-25

## 2022-10-25 LAB
ABO/RH(D): NORMAL
ANION GAP SERPL CALCULATED.3IONS-SCNC: 8 MMOL/L (ref 5–18)
ANTIBODY SCREEN: NEGATIVE
BASOPHILS # BLD AUTO: 0 10E3/UL (ref 0–0.2)
BASOPHILS NFR BLD AUTO: 0 %
BLD PROD TYP BPU: NORMAL
BLOOD COMPONENT TYPE: NORMAL
BUN SERPL-MCNC: 9 MG/DL (ref 8–22)
CALCIUM SERPL-MCNC: 8.7 MG/DL (ref 8.5–10.5)
CHLORIDE BLD-SCNC: 107 MMOL/L (ref 98–107)
CO2 SERPL-SCNC: 22 MMOL/L (ref 22–31)
CODING SYSTEM: NORMAL
CREAT SERPL-MCNC: 0.82 MG/DL (ref 0.6–1.1)
CROSSMATCH: NORMAL
EOSINOPHIL # BLD AUTO: 0.1 10E3/UL (ref 0–0.7)
EOSINOPHIL NFR BLD AUTO: 2 %
ERYTHROCYTE [DISTWIDTH] IN BLOOD BY AUTOMATED COUNT: 23.7 % (ref 10–15)
GFR SERPL CREATININE-BSD FRML MDRD: >90 ML/MIN/1.73M2
GLUCOSE BLD-MCNC: 94 MG/DL (ref 70–125)
HCG SERPL QL: NEGATIVE
HCT VFR BLD AUTO: 15.1 % (ref 35–47)
HGB BLD-MCNC: 4.1 G/DL (ref 11.7–15.7)
IMM GRANULOCYTES # BLD: 0 10E3/UL
IMM GRANULOCYTES NFR BLD: 1 %
ISSUE DATE AND TIME: NORMAL
LYMPHOCYTES # BLD AUTO: 1.1 10E3/UL (ref 0.8–5.3)
LYMPHOCYTES NFR BLD AUTO: 29 %
MCH RBC QN AUTO: 20.2 PG (ref 26.5–33)
MCHC RBC AUTO-ENTMCNC: 27.2 G/DL (ref 31.5–36.5)
MCV RBC AUTO: 74 FL (ref 78–100)
MONOCYTES # BLD AUTO: 0.4 10E3/UL (ref 0–1.3)
MONOCYTES NFR BLD AUTO: 10 %
NEUTROPHILS # BLD AUTO: 2.1 10E3/UL (ref 1.6–8.3)
NEUTROPHILS NFR BLD AUTO: 58 %
NRBC # BLD AUTO: 0 10E3/UL
NRBC BLD AUTO-RTO: 0 /100
PLATELET # BLD AUTO: 166 10E3/UL (ref 150–450)
POTASSIUM BLD-SCNC: 3.5 MMOL/L (ref 3.5–5)
RBC # BLD AUTO: 2.03 10E6/UL (ref 3.8–5.2)
SODIUM SERPL-SCNC: 137 MMOL/L (ref 136–145)
SPECIMEN EXPIRATION DATE: NORMAL
T4 FREE SERPL-MCNC: 0.32 NG/DL (ref 0.9–1.7)
TSH SERPL DL<=0.005 MIU/L-ACNC: 110.21 UIU/ML (ref 0.3–5)
UNIT ABO/RH: NORMAL
UNIT NUMBER: NORMAL
UNIT STATUS: NORMAL
UNIT TYPE ISBT: 600
WBC # BLD AUTO: 3.7 10E3/UL (ref 4–11)

## 2022-10-25 PROCEDURE — 36415 COLL VENOUS BLD VENIPUNCTURE: CPT | Performed by: EMERGENCY MEDICINE

## 2022-10-25 PROCEDURE — 85025 COMPLETE CBC W/AUTO DIFF WBC: CPT | Performed by: EMERGENCY MEDICINE

## 2022-10-25 PROCEDURE — 82374 ASSAY BLOOD CARBON DIOXIDE: CPT | Performed by: EMERGENCY MEDICINE

## 2022-10-25 PROCEDURE — G0378 HOSPITAL OBSERVATION PER HR: HCPCS

## 2022-10-25 PROCEDURE — P9016 RBC LEUKOCYTES REDUCED: HCPCS | Performed by: EMERGENCY MEDICINE

## 2022-10-25 PROCEDURE — 84999 UNLISTED CHEMISTRY PROCEDURE: CPT | Performed by: EMERGENCY MEDICINE

## 2022-10-25 PROCEDURE — 84443 ASSAY THYROID STIM HORMONE: CPT | Performed by: EMERGENCY MEDICINE

## 2022-10-25 PROCEDURE — 86901 BLOOD TYPING SEROLOGIC RH(D): CPT | Performed by: EMERGENCY MEDICINE

## 2022-10-25 PROCEDURE — 36430 TRANSFUSION BLD/BLD COMPNT: CPT

## 2022-10-25 PROCEDURE — C9803 HOPD COVID-19 SPEC COLLECT: HCPCS

## 2022-10-25 PROCEDURE — 86902 BLOOD TYPE ANTIGEN DONOR EA: CPT | Performed by: EMERGENCY MEDICINE

## 2022-10-25 PROCEDURE — 84439 ASSAY OF FREE THYROXINE: CPT | Performed by: EMERGENCY MEDICINE

## 2022-10-25 PROCEDURE — 86922 COMPATIBILITY TEST ANTIGLOB: CPT | Performed by: EMERGENCY MEDICINE

## 2022-10-25 PROCEDURE — 99285 EMERGENCY DEPT VISIT HI MDM: CPT | Mod: 25

## 2022-10-25 PROCEDURE — 84703 CHORIONIC GONADOTROPIN ASSAY: CPT | Performed by: EMERGENCY MEDICINE

## 2022-10-25 ASSESSMENT — ACTIVITIES OF DAILY LIVING (ADL)
ADLS_ACUITY_SCORE: 35
ADLS_ACUITY_SCORE: 37
ADLS_ACUITY_SCORE: 37
ADLS_ACUITY_SCORE: 35

## 2022-10-25 NOTE — DISCHARGE INSTRUCTIONS
You were seen in the emergency department for low blood counts.  You received a blood transfusion here.  We would like you to follow-up closely in clinic to continue your evaluation including probably recheck of blood counts and also to review your abnormal thyroid testing.

## 2022-10-25 NOTE — PHARMACY-ADMISSION MEDICATION HISTORY
Pharmacy Note - Admission Medication History    Pertinent Provider Information:     Patient sometimes take her levothyroxine and sometimes doesn't   ______________________________________________________________________    Prior To Admission (PTA) med list completed and updated in EMR.       PTA Med List   Medication Sig Last Dose     levothyroxine (SYNTHROID/LEVOTHROID) 137 MCG tablet Take 137 mcg by mouth daily 10/25/2022       Information source(s): Patient and CareEverywhere/SureScripts  Method of interview communication: in-person    Summary of Changes to PTA Med List  New: none  Discontinued: sertraline  Changed: levothyroxine    Patient was asked about OTC/herbal products specifically.  PTA med list reflects this.    Allergies were reviewed, assessed, and updated with the patient.      Patient does not use any multi-dose medications prior to admission.    The information provided in this note is only as accurate as the sources available at the time of the update(s).    Thank you for the opportunity to participate in the care of this patient.    Ann Marie Fields RPH  10/25/2022 3:25 PM

## 2022-10-25 NOTE — ED TRIAGE NOTES
Patient is here with a hemoglobin 4.7. Unsure where the bleeding is coming from. She was sent here from the clinic.

## 2022-10-25 NOTE — ED PROVIDER NOTES
EMERGENCY DEPARTMENT ENCOUNTER      NAME: Bonnie Webster  AGE: 42 year old female  YOB: 1980  MRN: [id number]  EVALUATION DATE & TIME: 10/25/2022 11:11 AM    PCP: Rolanda Xiong    ED PROVIDER: Milton Lozada M.D.      Chief Complaint   Patient presents with     Abnormal Labs       FINAL IMPRESSION:  1. Iron deficiency anemia due to chronic blood loss        ED COURSE & MEDICAL DECISION MAKIN year old female presents to the Emergency Department for evaluation of abnormal labs.  She is vitally stable when she arrives to the emergency department.  She has a longstanding history of menorrhagia and anemia as well as hypothyroidism.  She presents after an outpatient blood draw revealed a hemoglobin of 4.  She does have some fatigue and orthostatic type symptoms.  Ordered type and screen and lab evaluation here.  Discussed need for admission and multiple transfusions.  This will be a slower process due to antibodies from previous transfusions.  Patient was in agreement with this.  At a couple of points there were severe challenges with the interaction between the patient and staff here.  She pushed the nurses arm away while trying to start an IV.  She refused a routine admission COVID test here and when informed that she should be in isolation/PUI precautions she refused to have her visitor leave.  When I was trying to discuss the hospital policy with her further she tried to live-stream the conversation without consent, at which point I left the room. She said at one point she wanted to stream everything so she can go viral. Engaged with risk management and house supervisor.  They are going to discuss things with the patient further.  Apparently will be allowed to have a visitor acknowledging COVID risks and will be admitted to complete her transfusion and evaluation.    Addendum: Patient refused to discuss things with house supervisor.  I discussed things with hospitalist Dr Self here again.  I  did inform the patient that none of the hospital staff are going to consent to her live-streaming her care, she was not using her phone at the time I was in the room. This is certainly an atypical situation, and has escalated severely and continues to do so almost every time any staff tries to talk to her.  At this point I want to get the patient a transfusion so that she can be stabilized and discharged. She shares this goal and has no interest in further evaluation or admission.  I have ordered her 3 units of PRBCs, she will  stay in the emergency department to complete the transfusion and if everything proceeds without complication, will be discharged by the oncoming provider Dr. Tejada.  She will need to follow-up in primary care for the remainder of her evaluation such as recheck hemoglobin and follow up on her abnormal thyroid function.      MEDICATIONS GIVEN IN THE EMERGENCY:  Medications - No data to display    NEW PRESCRIPTIONS STARTED AT TODAY'S ER VISIT  New Prescriptions    No medications on file          =================================================================    HPI    Patient information was obtained from: Patient    Use of : N/A       Bonnie Webster is a 42 year old female with a pertinent history of Graves disease, Hashimoto's disease, cannabis abuse, hypokalemia, and adjustment disorder with mixed anxiety and depressed mood, who presents to this ED by walk in for evaluation of abnormal labs.    Patient reports that while at work yesterday she stood up to answer the phone and then fell to the ground. The patient went to the clinic and found that she had a hemoglobin of 4.7 and was told to report to the ED. Patient reports that her last iron transfusion was about a year ago and her last blood transfusion was over 3 years ago. Patient is unsure of where the bleeding is coming from but notes that she does have heavy periods. Patient denies headache, chest pain, shortness of breath,  fever, cough, or any other concerns at this time.    REVIEW OF SYSTEMS   All systems reviewed and negative except as noted in HPI.    PAST MEDICAL HISTORY:  Past Medical History:   Diagnosis Date     Adjustment disorder with mixed anxiety and depressed mood 2019     Anemia      B12 deficiency anemia      Cannabis abuse 2020     Chronic infection 2017    MRSA     Exophthalmos of both eyes 2020     Graves disease 2020     Hashimoto's disease 2020     History of blood transfusion      Hypokalemia 2020     Severe anemia 2018       PAST SURGICAL HISTORY:  Past Surgical History:   Procedure Laterality Date     BONE MARROW BIOPSY       BONE MARROW BIOPSY, BONE SPECIMEN, NEEDLE/TROCAR       C/SECTION, CLASSICAL      x2      SECTION      x2     ORIF METACARPAL FRACTURE Left 2013     ORTHOPEDIC SURGERY Left 2013    ORIF METECARPAL      THYROID BIOPSY  10/2020     THYROIDECTOMY N/A 2021    Procedure: TOTAL THYROIDECTOMY;  Surgeon: Stefani Perez MD;  Location:  OR      BIOPSY THYROID FINE NEEDLE ASPIRATION  10/21/2020           CURRENT MEDICATIONS:    No current facility-administered medications for this encounter.     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 A delay in compatible RBCs may occur.        Ferumoxytol Other (See Comments)     Severe back pain and spasms     Latex Swelling       FAMILY HISTORY:  Family History   Problem Relation Age of Onset     No Known Problems Mother      No Known Problems Father         unknown     No Known Problems Sister      No Known Problems Brother      No Known Problems Son      No Known Problems Daughter        SOCIAL HISTORY:   Social History  "    Socioeconomic History     Marital status: Single   Tobacco Use     Smoking status: Former     Packs/day: 0.10     Types: Cigarettes     Quit date: 2021     Years since quittin.7     Smokeless tobacco: Never     Tobacco comments:     4-5 daily   Substance and Sexual Activity     Alcohol use: No     Drug use: No     Types: Marijuana     Sexual activity: Never   Social History Narrative    Lives with her son, Sincere () and daughter Semaj ().  Works for [employer], reception.       VITALS:  /63   Pulse 74   Temp 98  F (36.7  C)   Resp 21   Ht 1.727 m (5' 8\")   Wt 65.3 kg (144 lb)   LMP 10/20/2022 (Exact Date)   SpO2 100%   BMI 21.90 kg/m      PHYSICAL EXAM    Constitutional: Well developed, Well nourished, NAD.  HENT: Normocephalic, Atraumatic. Neck Supple.  Eyes: EOMI, Conjunctiva normal.  Respiratory: Breathing comfortably on room air. Speaks full sentences easily. Lungs clear to ascultation.  Cardiovascular: Normal heart rate, Regular rhythm. No peripheral edema.  Abdomen: Soft, nontender  Musculoskeletal: Good range of motion in all major joints. No major deformities noted.  Integument: Warm, Dry.  Neurologic: Alert & awake, Normal motor function, Normal sensory function, No focal deficits noted.   Psychiatric: Cooperative. Affect appropriate.     LAB:  All pertinent labs reviewed and interpreted.  Labs Ordered and Resulted from Time of ED Arrival to Time of ED Departure   TSH WITH FREE T4 REFLEX - Abnormal       Result Value    .21 (*)    CBC WITH PLATELETS AND DIFFERENTIAL - Abnormal    WBC Count 3.7 (*)     RBC Count 2.03 (*)     Hemoglobin 4.1 (*)     Hematocrit 15.1 (*)     MCV 74 (*)     MCH 20.2 (*)     MCHC 27.2 (*)     RDW 23.7 (*)     Platelet Count 166      % Neutrophils 58      % Lymphocytes 29      % Monocytes 10      % Eosinophils 2      % Basophils 0      % Immature Granulocytes 1      NRBCs per 100 WBC 0      Absolute Neutrophils 2.1      " Absolute Lymphocytes 1.1      Absolute Monocytes 0.4      Absolute Eosinophils 0.1      Absolute Basophils 0.0      Absolute Immature Granulocytes 0.0      Absolute NRBCs 0.0     T4 FREE - Abnormal    Free T4 0.32 (*)    BASIC METABOLIC PANEL - Normal    Sodium 137      Potassium 3.5      Chloride 107      Carbon Dioxide (CO2) 22      Anion Gap 8      Urea Nitrogen 9      Creatinine 0.82      Calcium 8.7      Glucose 94      GFR Estimate >90     HCG QUALITATIVE PREGNANCY - Normal    hCG Serum Qualitative Negative     COVID-19 VIRUS (CORONAVIRUS) BY PCR   LABORATORY MISCELLANEOUS ORDER   TYPE AND SCREEN, ADULT    ABO/RH(D) A POS      Antibody Screen Negative      SPECIMEN EXPIRATION DATE [id number]     PREPARE RED BLOOD CELLS (UNIT)   ABO/RH TYPE AND SCREEN           I, Haroon Sandra, am serving as a scribe to document services personally performed by Dr. Milton Lozada, based on my observation and the provider's statements to me. I, Milton Lozada MD attest that Haroon Sandra is acting in a scribe capacity, has observed my performance of the services and has documented them in accordance with my direction.    Milton Lozada M.D.  Emergency Medicine  [facility] EMERGENCY ROOM  [address] [address]  [phone]  Dept: [phone]       Milton Lozada MD  10/25/22 1627       Milton Lozada MD  10/25/22 [id number]

## 2022-10-26 NOTE — ED NOTES
Introduced self to pt and let her know to call if she needs anything and if she begins to have transfusion reaction symptoms during 2nd transfusion of blood.

## 2022-10-26 NOTE — ED NOTES
Transfusion complete and provider notified. No further labs were ordered by provider notification when asked. Patient made ready for discharge. Patient was given information/card for patient relations due to experiences throughout her stay at [facility].

## 2022-10-26 NOTE — PROGRESS NOTES
Infusing second bag of RBCs. VSS. No adverse reaction. Boyfriend by bedside. Pt continues to voice dissatisfaction with hospital. Has periods of being winston and irritable. Given clear explanation and offered 1:1 support. Mood improving. Report given to ABRIL Whalen.

## 2022-10-26 NOTE — PROGRESS NOTES
"PRIMARY DIAGNOSIS: \"GENERIC\" NURSING  OUTPATIENT/OBSERVATION GOALS TO BE MET BEFORE DISCHARGE:  1. ADLs back to baseline: Yes    2. Activity and level of assistance: Up with standby assistance.    3. Pain status: Pain free.    4. Return to near baseline physical activity: Yes     Discharge Planner Nurse   Safe discharge environment identified: Yes  Barriers to discharge: Yes--pt requiring 3 units of blood and then to d/c       Entered by: PEPE MORA RN 10/25/2022 7:17 PM       Pt is alert and oriented.  Irritable and mood is labile.  Occasionally rude to staff members.  Pt is aware that there is only 1 visitor allowed. Brother is to leave if boyfriend is to visit.  First unit of PRBC's infusing.  No reaction noted.  VSS.  Pt to transfer upstairs and to possibly discharge after infusions.       Please review provider order for any additional goals.   Nurse to notify provider when observation goals have been met and patient is ready for discharge.  "

## 2022-11-03 LAB
Lab: NORMAL
PERFORMING LABORATORY: NORMAL
TEST NAME: NORMAL

## 2023-01-07 ENCOUNTER — HEALTH MAINTENANCE LETTER (OUTPATIENT)
Age: 43
End: 2023-01-07

## 2023-03-22 ENCOUNTER — OFFICE VISIT (OUTPATIENT)
Dept: FAMILY MEDICINE | Facility: CLINIC | Age: 43
End: 2023-03-22
Payer: COMMERCIAL

## 2023-03-22 VITALS
SYSTOLIC BLOOD PRESSURE: 90 MMHG | DIASTOLIC BLOOD PRESSURE: 70 MMHG | BODY MASS INDEX: 23.45 KG/M2 | WEIGHT: 149.4 LBS | HEIGHT: 67 IN | TEMPERATURE: 98.5 F | OXYGEN SATURATION: 99 % | HEART RATE: 63 BPM

## 2023-03-22 DIAGNOSIS — F41.9 ANXIETY: ICD-10-CM

## 2023-03-22 DIAGNOSIS — D64.9 ANEMIA, UNSPECIFIED TYPE: Primary | ICD-10-CM

## 2023-03-22 DIAGNOSIS — E89.0 POSTOPERATIVE HYPOTHYROIDISM: ICD-10-CM

## 2023-03-22 DIAGNOSIS — E53.8 VITAMIN B12 DEFICIENCY (NON ANEMIC): ICD-10-CM

## 2023-03-22 DIAGNOSIS — E05.00 GRAVES DISEASE: ICD-10-CM

## 2023-03-22 LAB
ERYTHROCYTE [DISTWIDTH] IN BLOOD BY AUTOMATED COUNT: 20.1 % (ref 10–15)
HCT VFR BLD AUTO: 22.9 % (ref 35–47)
HGB BLD-MCNC: 6.5 G/DL (ref 11.7–15.7)
MCH RBC QN AUTO: 22.7 PG (ref 26.5–33)
MCHC RBC AUTO-ENTMCNC: 28.4 G/DL (ref 31.5–36.5)
MCV RBC AUTO: 80 FL (ref 78–100)
PLATELET # BLD AUTO: 226 10E3/UL (ref 150–450)
RBC # BLD AUTO: 2.86 10E6/UL (ref 3.8–5.2)
T4 FREE SERPL-MCNC: 1.16 NG/DL (ref 0.9–1.7)
TSH SERPL DL<=0.005 MIU/L-ACNC: 25 UIU/ML (ref 0.3–4.2)
WBC # BLD AUTO: 4.7 10E3/UL (ref 4–11)

## 2023-03-22 PROCEDURE — 36415 COLL VENOUS BLD VENIPUNCTURE: CPT | Performed by: PHYSICIAN ASSISTANT

## 2023-03-22 PROCEDURE — 84443 ASSAY THYROID STIM HORMONE: CPT | Performed by: PHYSICIAN ASSISTANT

## 2023-03-22 PROCEDURE — 99214 OFFICE O/P EST MOD 30 MIN: CPT | Performed by: PHYSICIAN ASSISTANT

## 2023-03-22 PROCEDURE — 85027 COMPLETE CBC AUTOMATED: CPT | Performed by: PHYSICIAN ASSISTANT

## 2023-03-22 PROCEDURE — 84439 ASSAY OF FREE THYROXINE: CPT | Performed by: PHYSICIAN ASSISTANT

## 2023-03-22 RX ORDER — LANOLIN ALCOHOL/MO/W.PET/CERES
1000 CREAM (GRAM) TOPICAL DAILY
Qty: 90 TABLET | Refills: 3 | Status: SHIPPED | OUTPATIENT
Start: 2023-03-22

## 2023-03-22 RX ORDER — PROPRANOLOL HYDROCHLORIDE 10 MG/1
TABLET ORAL
Qty: 40 TABLET | Refills: 1 | Status: SHIPPED | OUTPATIENT
Start: 2023-03-22

## 2023-03-22 ASSESSMENT — PATIENT HEALTH QUESTIONNAIRE - PHQ9
SUM OF ALL RESPONSES TO PHQ QUESTIONS 1-9: 20
SUM OF ALL RESPONSES TO PHQ QUESTIONS 1-9: 20
10. IF YOU CHECKED OFF ANY PROBLEMS, HOW DIFFICULT HAVE THESE PROBLEMS MADE IT FOR YOU TO DO YOUR WORK, TAKE CARE OF THINGS AT HOME, OR GET ALONG WITH OTHER PEOPLE: EXTREMELY DIFFICULT

## 2023-03-22 ASSESSMENT — ENCOUNTER SYMPTOMS: NERVOUS/ANXIOUS: 1

## 2023-03-22 NOTE — PROGRESS NOTES
Assessment & Plan     Anemia, unspecified type  -severe.  Will order for 2u PRBCs at the infusion center  -plan to f/u next week to address plan for anemia management.  May need to see GYN for menorrhagia.    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  Will ask to clarify the consistency with which she takes her medications.  Has been inconsistent in the past.    - TSH with free T4 reflex; Future  - TSH with free T4 reflex  - T4 free    Anxiety  -trial of propranolol for severe anxiety attacks.  hope  - propranolol (INDERAL) 10 MG tablet; Take 1-2 tablets 2-3 times a day as needed    Vitamin B12 deficiency (non anemic)  -trial of daily supplement initially discussed, review of chart shows that she was positive for intrinsic factor antibodies.  Will encourage pt to have monthly injections.    -will recheck in 6-8 weeks.    - cyanocobalamin (VITAMIN B-12) 1000 MCG tablet; Take 1 tablet (1,000 mcg) by mouth daily      Nicotine/Tobacco Cessation:  She reports that she has been smoking cigarettes. She has been smoking an  "average of .1 packs per day. She has been exposed to tobacco smoke. She has never used smokeless tobacco.    Depression Screening Follow Up    PHQ 3/22/2023   PHQ-9 Total Score 20   Q9: Thoughts of better off dead/self-harm past 2 weeks Not at all   F/U: Thoughts of suicide or self-harm -   F/U: Safety concerns -   Some encounter information is confidential and restricted. Go to Review Flowsheets activity to see all data.     Last PHQ-9 3/22/2023   1.  Little interest or pleasure in doing things 3   2.  Feeling down, depressed, or hopeless 2   3.  Trouble falling or staying asleep, or sleeping too much 3   4.  Feeling tired or having little energy 3   5.  Poor appetite or overeating 3   6.  Feeling bad about yourself 0   7.  Trouble concentrating 3   8.  Moving slowly or restless 3   Q9: Thoughts of better off dead/self-harm past 2 weeks 0   PHQ-9 Total Score 20   Difficulty at work, home, or with people -   In the past two weeks have you had thoughts of suicide or self harm? -   Do you have concerns about your personal safety or the safety of others? -   Some encounter information is confidential and restricted. Go to Review Flowsheets activity to see all data.       Follow Up Actions Taken  Patient counseled, no additional follow up at this time.     Melinda Giles PA-C  [facility]   Bonnie is a 42 year old, presenting for the following health issues:  Thyroid Problem (\"Levels all over\"/Weight fluctuation), Anxiety (Intermittent long term, but more persistent since 10/2022./), Medication Request (Order for iron infusions), and Ear Problem (Painful over last few days)    Additional Questions 3/22/2023   Roomed by Priya Virgen    History of Present Illness       Hypothyroidism:     Since last visit, patient describes the following symptoms::  Anxiety, Depression, Dry skin, Fatigue, Weight gain and Weight loss    Weight gain::  6-10 lbs.    She eats 2-3 servings of " "fruits and vegetables daily.She consumes 3 sweetened beverage(s) daily.She exercises with enough effort to increase her heart rate 60 or more minutes per day.  She exercises with enough effort to increase her heart rate 6 days per week. She is missing 2 dose(s) of medications per week.  She is not taking prescribed medications regularly due to side effects and remembering to take.    Today's PHQ-9         PHQ-9 Total Score: 20    PHQ-9 Q9 Thoughts of better off dead/self-harm past 2 weeks :   Not at all    How difficult have these problems made it for you to do your work, take care of things at home, or get along with other people: Extremely difficult     -hx of anemia.  Feels it is \"bad now\"  -very fatigued, no energy  -mood is low as well.  Anxiety is HIGH  -has very heavy periods and was told this was the cause of the anemia.  Is not taking iron  -also has a hx of B12 deficiency and has not been compliant with B12 injections in the past  -no hx of GI issues, no stomach surgeries.   -review of pt's chart:  Patient has been evaluated by heme/oncology in 2019, it was felt to be severe iron deficiency anemia due to menometrorrhagia and vitamin B12 deficiency has positive intrinsic factor blocking antibody.  Patient has had IV iron in the past.  She admits that she has never taken iron pills since she does not like taking pills.  She was getting B12 injections until COVID-19 restrictions took place.  Hemoglobin today is 7.  1 unit of PRBC ordered by ER provider.  Encourage iron supplements and continue B12 injections as an outpatient     -has severe panic attacks that are getting worse  -dhara to drive due to them  -not sure the cause for the increase  -is not taking medications for this    -hx of Hashimoto's thyroiditis and is s/p thyroidectomy  -is to be taking levothyroxine 137mcg but struggles with compliance taking the medication  -will forget for days, sometimes weeks, in a row.  -hx of exopthalmos 2/2 thyroid " "disease      Review of Systems   Psychiatric/Behavioral: The patient is nervous/anxious.       Constitutional, HEENT, cardiovascular, pulmonary, gi and gu systems are negative, except as otherwise noted.      Objective    BP 90/70 (BP Location: Left arm, Patient Position: Sitting, Cuff Size: Adult Regular)   Pulse 63   Temp 98.5  F (36.9  C) (Temporal)   Ht 1.689 m (5' 6.5\")   Wt 67.8 kg (149 lb 6.4 oz)   LMP 03/13/2023 (Approximate)   SpO2 99%   BMI 23.75 kg/m    Body mass index is 23.75 kg/m .  Physical Exam   GENERAL: healthy, alert and no distress  NECK: no adenopathy, no asymmetry, masses, or scars and thyroid normal to palpation  RESP: lungs clear to auscultation - no rales, rhonchi or wheezes  CV: regular rate and rhythm, normal S1 S2, no S3 or S4, no murmur, click or rub, no peripheral edema and peripheral pulses strong  ABDOMEN: soft, nontender, no hepatosplenomegaly, no masses and bowel sounds normal  MS: no gross musculoskeletal defects noted, no edema  PSYCH: mentation appears normal and anxious    Results for orders placed or performed in visit on 03/22/23   TSH with free T4 reflex     Status: Abnormal   Result Value Ref Range    TSH 25.00 (H) 0.30 - 4.20 uIU/mL   CBC with platelets     Status: Abnormal   Result Value Ref Range    WBC Count 4.7 4.0 - 11.0 10e3/uL    RBC Count 2.86 (L) 3.80 - 5.20 10e6/uL    Hemoglobin 6.5 (LL) 11.7 - 15.7 g/dL    Hematocrit 22.9 (L) 35.0 - 47.0 %    MCV 80 78 - 100 fL    MCH 22.7 (L) 26.5 - 33.0 pg    MCHC 28.4 (L) 31.5 - 36.5 g/dL    RDW 20.1 (H) 10.0 - 15.0 %    Platelet Count 226 150 - 450 10e3/uL   T4 free     Status: Normal   Result Value Ref Range    Free T4 1.16 0.90 - 1.70 ng/dL                 "

## 2023-03-23 ENCOUNTER — TELEPHONE (OUTPATIENT)
Dept: FAMILY MEDICINE | Facility: CLINIC | Age: 43
End: 2023-03-23
Payer: COMMERCIAL

## 2023-03-23 DIAGNOSIS — D64.9 ANEMIA REQUIRING TRANSFUSIONS: Primary | ICD-10-CM

## 2023-03-23 RX ORDER — HEPARIN SODIUM (PORCINE) LOCK FLUSH IV SOLN 100 UNIT/ML 100 UNIT/ML
5 SOLUTION INTRAVENOUS
OUTPATIENT
Start: 2023-03-23

## 2023-03-23 RX ORDER — HEPARIN SODIUM,PORCINE 10 UNIT/ML
5 VIAL (ML) INTRAVENOUS
OUTPATIENT
Start: 2023-03-23

## 2023-03-23 NOTE — TELEPHONE ENCOUNTER
PRBC plan entered in Admit Therapy tab per prescriber orders. They should be reaching out to patient to schedule.

## 2023-03-23 NOTE — TELEPHONE ENCOUNTER
Called infusion center, they need therapy plan order - routing to PharmD for assistance.    Orders entered - scheduling number given to patient.    ----- Message from Melinda Giles PA-C sent at 3/22/2023  4:47 PM CDT -----  Team - please call patient with results.    Hgb is quite low. She should have 2U PRBCs infused at infusion center.  Please assist in arranging this.  Can be sometime this week.     Melinda Giles PA-C on 3/22/2023 at 4:39 PM

## 2023-03-24 ASSESSMENT — PATIENT HEALTH QUESTIONNAIRE - PHQ9
SUM OF ALL RESPONSES TO PHQ QUESTIONS 1-9: 20
10. IF YOU CHECKED OFF ANY PROBLEMS, HOW DIFFICULT HAVE THESE PROBLEMS MADE IT FOR YOU TO DO YOUR WORK, TAKE CARE OF THINGS AT HOME, OR GET ALONG WITH OTHER PEOPLE: EXTREMELY DIFFICULT

## 2023-04-25 ENCOUNTER — TELEPHONE (OUTPATIENT)
Dept: INTERNAL MEDICINE | Facility: CLINIC | Age: 43
End: 2023-04-25

## 2023-04-25 DIAGNOSIS — E05.00 GRAVES DISEASE: Primary | ICD-10-CM

## 2023-04-25 RX ORDER — LEVOTHYROXINE SODIUM 137 UG/1
137 TABLET ORAL DAILY
Qty: 30 TABLET | Refills: 3 | Status: SHIPPED | OUTPATIENT
Start: 2023-04-25 | End: 2023-05-26

## 2023-04-25 NOTE — TELEPHONE ENCOUNTER
No problem.  Refill sent.      I will also place an order for repeat labs to see if this is the right dose.  Can we check to see if she has missed any doses lately?      Thanks!    Melinda Giles PA-C on 4/25/2023 at 12:59 PM

## 2023-04-25 NOTE — TELEPHONE ENCOUNTER
She will make a lab appointment, she was out of the medication and that is why she had missed some doses.

## 2023-04-25 NOTE — TELEPHONE ENCOUNTER
Pt needs refill on levothyroxine (SYNTHROID/LEVOTHROID) 137 MCG tablet    Please call pt if any concerns Thanks.

## 2023-05-23 ENCOUNTER — OFFICE VISIT (OUTPATIENT)
Dept: FAMILY MEDICINE | Facility: CLINIC | Age: 43
End: 2023-05-23
Payer: COMMERCIAL

## 2023-05-23 VITALS
RESPIRATION RATE: 12 BRPM | HEART RATE: 67 BPM | TEMPERATURE: 98.2 F | DIASTOLIC BLOOD PRESSURE: 64 MMHG | SYSTOLIC BLOOD PRESSURE: 104 MMHG | BODY MASS INDEX: 20.46 KG/M2 | HEIGHT: 68 IN | WEIGHT: 135 LBS | OXYGEN SATURATION: 100 %

## 2023-05-23 DIAGNOSIS — D64.9 SEVERE ANEMIA: ICD-10-CM
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
## 2023-05-23 DIAGNOSIS — M54.50 LOW BACK PAIN WITHOUT SCIATICA, UNSPECIFIED BACK PAIN LATERALITY, UNSPECIFIED CHRONICITY: ICD-10-CM

## 2023-05-23 PROCEDURE — 99214 OFFICE O/P EST MOD 30 MIN: CPT | Performed by: FAMILY MEDICINE

## 2023-05-23 RX ORDER — IBUPROFEN 600 MG/1
600 TABLET, FILM COATED ORAL EVERY 6 HOURS PRN
Qty: 30 TABLET | Refills: 0 | Status: SHIPPED | OUTPATIENT
Start: 2023-05-23

## 2023-05-23 RX ORDER — CYCLOBENZAPRINE HCL 5 MG
5 TABLET ORAL 2 TIMES DAILY PRN
Qty: 30 TABLET | Refills: 0 | Status: SHIPPED | OUTPATIENT
Start: 2023-05-23

## 2023-05-23 NOTE — LETTER
May 23, 2023      Bonnie Webster  [address]  [place] [address]        To Whom It May Concern:    Bonnie Webster  was seen on 5/23/23.  Please excuse her  until 5/25/23 due to illness.        Sincerely,        Lorrie Enriquez MD
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
## 2023-05-26 ENCOUNTER — OFFICE VISIT (OUTPATIENT)
Dept: FAMILY MEDICINE | Facility: CLINIC | Age: 43
End: 2023-05-26
Payer: COMMERCIAL

## 2023-05-26 ENCOUNTER — ANCILLARY PROCEDURE (OUTPATIENT)
Dept: GENERAL RADIOLOGY | Facility: CLINIC | Age: 43
End: 2023-05-26
Attending: PHYSICIAN ASSISTANT
Payer: COMMERCIAL

## 2023-05-26 ENCOUNTER — TELEPHONE (OUTPATIENT)
Dept: INTERNAL MEDICINE | Facility: CLINIC | Age: 43
End: 2023-05-26

## 2023-05-26 VITALS
DIASTOLIC BLOOD PRESSURE: 79 MMHG | OXYGEN SATURATION: 100 % | HEIGHT: 68 IN | RESPIRATION RATE: 16 BRPM | TEMPERATURE: 97.8 F | WEIGHT: 147 LBS | SYSTOLIC BLOOD PRESSURE: 122 MMHG | BODY MASS INDEX: 22.28 KG/M2 | HEART RATE: 54 BPM

## 2023-05-26 DIAGNOSIS — M54.2 NECK PAIN: ICD-10-CM

## 2023-05-26 DIAGNOSIS — M54.50 ACUTE BILATERAL LOW BACK PAIN WITHOUT SCIATICA: ICD-10-CM

## 2023-05-26 DIAGNOSIS — M54.6 ACUTE BILATERAL THORACIC BACK PAIN: ICD-10-CM
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
## 2023-05-26 DIAGNOSIS — M54.6 ACUTE BILATERAL THORACIC BACK PAIN: Primary | ICD-10-CM

## 2023-05-26 DIAGNOSIS — E06.3 HASHIMOTO'S DISEASE: ICD-10-CM

## 2023-05-26 DIAGNOSIS — D64.9 SEVERE ANEMIA: ICD-10-CM

## 2023-05-26 PROCEDURE — 72040 X-RAY EXAM NECK SPINE 2-3 VW: CPT | Mod: TC | Performed by: RADIOLOGY

## 2023-05-26 PROCEDURE — 72100 X-RAY EXAM L-S SPINE 2/3 VWS: CPT | Mod: TC | Performed by: RADIOLOGY

## 2023-05-26 PROCEDURE — 99213 OFFICE O/P EST LOW 20 MIN: CPT | Performed by: PHYSICIAN ASSISTANT

## 2023-05-26 PROCEDURE — 72070 X-RAY EXAM THORAC SPINE 2VWS: CPT | Mod: TC | Performed by: RADIOLOGY

## 2023-05-26 RX ORDER — LEVOTHYROXINE SODIUM 137 UG/1
137 TABLET ORAL DAILY
Qty: 30 TABLET | Refills: 3 | Status: SHIPPED | OUTPATIENT
Start: 2023-05-26 | End: 2024-01-10

## 2023-05-26 ASSESSMENT — ENCOUNTER SYMPTOMS: BACK PAIN: 1
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
## 2023-05-26 NOTE — TELEPHONE ENCOUNTER
Medication Question or Refill    Contacts       Type Contact Phone/Fax    05/26/2023 10:36 AM CDT Phone (Incoming) [facility] - [place], [place] - [address] (Pharmacy) [phone]          What medication are you calling about (include dose and sig)?: Ferrous Bisglycinate Chelate 28 MG CAPS    Preferred Pharmacy:     [facility] - [place], [place] - [address] [place] [address]  Phone: [phone] Fax: [phone]      Controlled Substance Agreement on file:   CSA -- Patient Level:    CSA: None found at the patient level.       Who prescribed the medication?: Dr. Enriquez    Do you need a refill? No    When did you use the medication last? Not use yet.    Patient offered an appointment? No    Do you have any questions or concerns?  Yes: Pharmacy call to get clarification of dosage. Medication detail has: Sig - Route: Take 30 capsules by mouth daily - Oral.    Please advise and call pharmacy back.      Could we send this information to you in [employer] or would you prefer to receive a phone call?:   Patient would prefer a phone call   Okay to leave a detailed message?: Yes at Cell number on file:    Telephone Information:   Mobile [phone]

## 2023-05-31 ENCOUNTER — OFFICE VISIT (OUTPATIENT)
Dept: FAMILY MEDICINE | Facility: CLINIC | Age: 43
End: 2023-05-31
Payer: COMMERCIAL

## 2023-05-31 VITALS
SYSTOLIC BLOOD PRESSURE: 118 MMHG | OXYGEN SATURATION: 100 % | RESPIRATION RATE: 16 BRPM | HEART RATE: 73 BPM | DIASTOLIC BLOOD PRESSURE: 74 MMHG | TEMPERATURE: 97.6 F

## 2023-05-31 DIAGNOSIS — M54.6 ACUTE BILATERAL THORACIC BACK PAIN: ICD-10-CM

## 2023-05-31 DIAGNOSIS — E05.00 GRAVES DISEASE: Primary | ICD-10-CM

## 2023-05-31 DIAGNOSIS — D64.9 SEVERE ANEMIA: ICD-10-CM

## 2023-05-31 DIAGNOSIS — M54.50 ACUTE BILATERAL LOW BACK PAIN WITHOUT SCIATICA: ICD-10-CM
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
## 2023-05-31 DIAGNOSIS — M54.2 NECK PAIN: ICD-10-CM

## 2023-05-31 PROCEDURE — 99213 OFFICE O/P EST LOW 20 MIN: CPT | Performed by: PHYSICIAN ASSISTANT

## 2023-05-31 ASSESSMENT — PATIENT HEALTH QUESTIONNAIRE - PHQ9
SUM OF ALL RESPONSES TO PHQ QUESTIONS 1-9: 23
SUM OF ALL RESPONSES TO PHQ QUESTIONS 1-9: 23
10. IF YOU CHECKED OFF ANY PROBLEMS, HOW DIFFICULT HAVE THESE PROBLEMS MADE IT FOR YOU TO DO YOUR WORK, TAKE CARE OF THINGS AT HOME, OR GET ALONG WITH OTHER PEOPLE: EXTREMELY DIFFICULT
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
## 2023-06-03 ENCOUNTER — HOSPITAL ENCOUNTER (EMERGENCY)
Facility: HOSPITAL | Age: 43
Discharge: HOME | End: 2023-06-04
Attending: EMERGENCY MEDICINE | Admitting: EMERGENCY MEDICINE
Payer: COMMERCIAL

## 2023-06-03 VITALS
RESPIRATION RATE: 17 BRPM | WEIGHT: 140 LBS | TEMPERATURE: 97.7 F | HEART RATE: 56 BPM | OXYGEN SATURATION: 100 % | DIASTOLIC BLOOD PRESSURE: 61 MMHG | SYSTOLIC BLOOD PRESSURE: 118 MMHG | BODY MASS INDEX: 21.29 KG/M2

## 2023-06-03 DIAGNOSIS — D64.9 ANEMIA, UNSPECIFIED TYPE: ICD-10-CM

## 2023-06-03 DIAGNOSIS — R11.15 CYCLICAL VOMITING: ICD-10-CM

## 2023-06-03 DIAGNOSIS — F12.10 CANNABIS ABUSE: ICD-10-CM

## 2023-06-03 LAB
ALBUMIN SERPL BCG-MCNC: 4.3 G/DL (ref 3.5–5.2)
ALP SERPL-CCNC: 36 U/L (ref 35–104)
ALT SERPL W P-5'-P-CCNC: 8 U/L (ref 10–35)
ANION GAP SERPL CALCULATED.3IONS-SCNC: 14 MMOL/L (ref 7–15)
AST SERPL W P-5'-P-CCNC: 22 U/L (ref 10–35)
BASOPHILS # BLD AUTO: 0.1 10E3/UL (ref 0–0.2)
BASOPHILS NFR BLD AUTO: 1 %
BILIRUB SERPL-MCNC: 0.4 MG/DL
BUN SERPL-MCNC: 14.5 MG/DL (ref 6–20)
CALCIUM SERPL-MCNC: 9.2 MG/DL (ref 8.6–10)
CHLORIDE SERPL-SCNC: 102 MMOL/L (ref 98–107)
CREAT SERPL-MCNC: 0.88 MG/DL (ref 0.51–0.95)
DEPRECATED HCO3 PLAS-SCNC: 20 MMOL/L (ref 22–29)
EOSINOPHIL # BLD AUTO: 0 10E3/UL (ref 0–0.7)
EOSINOPHIL NFR BLD AUTO: 0 %
ERYTHROCYTE [DISTWIDTH] IN BLOOD BY AUTOMATED COUNT: 19.4 % (ref 10–15)
GFR SERPL CREATININE-BSD FRML MDRD: 83 ML/MIN/1.73M2
GLUCOSE SERPL-MCNC: 100 MG/DL (ref 70–99)
HCT VFR BLD AUTO: 24.6 % (ref 35–47)
HGB BLD-MCNC: 6.8 G/DL (ref 11.7–15.7)
IMM GRANULOCYTES # BLD: 0.1 10E3/UL
IMM GRANULOCYTES NFR BLD: 1 %
LIPASE SERPL-CCNC: 19 U/L (ref 13–60)
LYMPHOCYTES # BLD AUTO: 1.4 10E3/UL (ref 0.8–5.3)
LYMPHOCYTES NFR BLD AUTO: 23 %
MCH RBC QN AUTO: 20.2 PG (ref 26.5–33)
MCHC RBC AUTO-ENTMCNC: 27.6 G/DL (ref 31.5–36.5)
MCV RBC AUTO: 73 FL (ref 78–100)
MONOCYTES # BLD AUTO: 0.5 10E3/UL (ref 0–1.3)
MONOCYTES NFR BLD AUTO: 9 %
NEUTROPHILS # BLD AUTO: 4 10E3/UL (ref 1.6–8.3)
NEUTROPHILS NFR BLD AUTO: 66 %
NRBC # BLD AUTO: 0 10E3/UL
NRBC BLD AUTO-RTO: 0 /100
PLATELET # BLD AUTO: 218 10E3/UL (ref 150–450)
POTASSIUM SERPL-SCNC: 3.8 MMOL/L (ref 3.4–5.3)
PROT SERPL-MCNC: 7.7 G/DL (ref 6.4–8.3)
RBC # BLD AUTO: 3.37 10E6/UL (ref 3.8–5.2)
SODIUM SERPL-SCNC: 136 MMOL/L (ref 136–145)
WBC # BLD AUTO: 6.1 10E3/UL (ref 4–11)

## 2023-06-03 PROCEDURE — 96361 HYDRATE IV INFUSION ADD-ON: CPT

## 2023-06-03 PROCEDURE — 80053 COMPREHEN METABOLIC PANEL: CPT | Performed by: EMERGENCY MEDICINE

## 2023-06-03 PROCEDURE — 36415 COLL VENOUS BLD VENIPUNCTURE: CPT | Performed by: EMERGENCY MEDICINE

## 2023-06-03 PROCEDURE — 83690 ASSAY OF LIPASE: CPT | Performed by: EMERGENCY MEDICINE

## 2023-06-03 PROCEDURE — 258N000003 HC RX IP 258 OP 636: Performed by: EMERGENCY MEDICINE

## 2023-06-03 PROCEDURE — 250N000011 HC RX IP 250 OP 636: Performed by: EMERGENCY MEDICINE

## 2023-06-03 PROCEDURE — 99284 EMERGENCY DEPT VISIT MOD MDM: CPT | Mod: 25

## 2023-06-03 PROCEDURE — 96374 THER/PROPH/DIAG INJ IV PUSH: CPT

## 2023-06-03 PROCEDURE — 85025 COMPLETE CBC W/AUTO DIFF WBC: CPT | Performed by: EMERGENCY MEDICINE

## 2023-06-03 RX ORDER — PROMETHAZINE HYDROCHLORIDE 25 MG/1
25 SUPPOSITORY RECTAL EVERY 6 HOURS PRN
Qty: 12 SUPPOSITORY | Refills: 0 | Status: SHIPPED | OUTPATIENT
Start: 2023-06-03

## 2023-06-03 RX ORDER — ONDANSETRON 4 MG/1
8 TABLET, ORALLY DISINTEGRATING ORAL EVERY 8 HOURS PRN
Qty: 12 TABLET | Refills: 0 | Status: SHIPPED | OUTPATIENT
Start: 2023-06-03 | End: 2023-06-06

## 2023-06-03 RX ORDER — HALOPERIDOL 5 MG/ML
2 INJECTION INTRAMUSCULAR ONCE
Status: COMPLETED | OUTPATIENT
Start: 2023-06-03 | End: 2023-06-03

## 2023-06-03 RX ADMIN — SODIUM CHLORIDE 1000 ML: 9 INJECTION, SOLUTION INTRAVENOUS at 23:05

## 2023-06-03 RX ADMIN — HALOPERIDOL LACTATE 2 MG: 5 INJECTION, SOLUTION INTRAMUSCULAR at 23:05

## 2023-06-03 ASSESSMENT — ACTIVITIES OF DAILY LIVING (ADL): ADLS_ACUITY_SCORE: 35

## 2023-06-04 LAB
HOLD SPECIMEN: NORMAL
HOLD SPECIMEN: NORMAL

## 2023-06-04 NOTE — ED PROVIDER NOTES
EMERGENCY DEPARTMENT ENCOUNTER      NAME: Bonnie Webster  AGE: 42 year old female  YOB: 1980  MRN: [id number]  EVALUATION DATE & TIME: 6/3/2023 10:34 PM    PCP: Rolanda Xiong    ED PROVIDER: Dionne Arenas MD    Chief Complaint   Patient presents with     Abdominal Pain         FINAL IMPRESSION:  1. Cyclical vomiting    2. Anemia, unspecified type    3. Cannabis abuse          ED COURSE & MEDICAL DECISION MAKING:    Pertinent Labs & Imaging studies reviewed. (See chart for details)  42 year old female with history of Graves' disease, anemia and iron deficiency, cannabis abuse who presents to the Emergency Department for evaluation of 5 days of nausea, vomiting.  Symptoms seem much more consistent with cyclical vomiting syndrome and cannabis hyperemesis than they do anything else.  She believes that her symptoms started after she ate out and previously this was thought to be a gastroenteritis but she does not have any associated diarrhea with this and given the duration I think is less likely.  Concern for dehydration or electrolyte abnormality.  Pancreatitis, hepatobiliary pathology less likely.    Patient initially seen evaluated by myself in triage area, IV established and blood obtained.  Room 1 L normal saline bolus, 2 mg Haldol with improvement of her symptoms.  Able to tolerate oral challenge thereafter.  CBC, CMP, lipase notable only for her baseline anemia with hemoglobin of 6.8 that is actually up from previous.  She is scheduled with her outpatient PCP for iron infusion.  I do not think she warrants PRBC transfusion tonight.  Patient counseled regarding cannabis abuse.  Will discharge to home with Zofran, Phenergan as needed.      ED Course as of 06/04/23 0004   Sat Jun 03, 2023   2250 I met with patient for initial encounter.   2338 Hemoglobin(!!): 6.8  Up from 4-6 baseline. Has outpt orders for iron infusion       Medical Decision Making    History:    Supplemental history from: Family  Member/Significant Other    External Record(s) reviewed: Outpatient Record: [facility] ER visit yesterday    Work Up:    Chart documentation includes differential considered and any EKGs or imaging independently interpreted by provider, where specified.    In additional to work up documented, I considered the following work up: See MDM    External consultation:    Discussion of management with another provider: N/A    Complicating factors:    Care impacted by chronic illness: Other: Graves' disease    Care affected by social determinants of health: Access to Medical Care weekend night shift no access to PCP    Disposition considerations: Discharge. I prescribed additional prescription strength medication(s) as charted. N/A.        At the conclusion of the encounter I discussed the results of all of the tests and the disposition. The questions were answered. The patient or family acknowledged understanding and was agreeable with the care plan.      MEDICATIONS GIVEN IN THE EMERGENCY:  Medications   0.9% sodium chloride BOLUS (0 mLs Intravenous Stopped 6/4/23 0000)   haloperidol lactate (HALDOL) injection 2 mg (2 mg Intravenous $Given 6/3/23 2305)       NEW PRESCRIPTIONS STARTED AT TODAY'S ER VISIT  New Prescriptions    ONDANSETRON (ZOFRAN ODT) 4 MG ODT TAB    Take 2 tablets (8 mg) by mouth every 8 hours as needed    PROMETHAZINE (PHENERGAN) 25 MG SUPPOSITORY    Place 1 suppository (25 mg) rectally every 6 hours as needed for nausea          =================================================================    HPI    Patient information was obtained from: Patient    Use of Intrepreter: N/A     Bonnie Webster is a 42 year old female with pertinent medical history of Graves disease, Hashimoto's disease, tobacco use, cannabis abuse, who presents via walk-in for evaluation of abdominal pain.    Per chart review, patient was seen on 6/2 in [facility] ED for vomiting. Work up negative aside from hemoglobin of 7.0. Patient improved  after zofran and fluids and was discharged to home with ferrous sulfate.    Patient endorses vomiting that has been ongoing for the last 5 days and began after she ate a chicken sandwich from [employer]. She states that she cannot keep anything down and endorses chest pain secondary to vomiting. Patient endorses tobacco and marijuana use, but says she has not smoked in the 5 days since her symptoms began. She denies alcohol use. Patient reports having similar symptoms 3 years ago that was diagnosed as cyclical vomiting from marijuana use.    Patient denies all other complaints at this time.      PAST MEDICAL HISTORY:  Past Medical History:   Diagnosis Date     Adjustment disorder with mixed anxiety and depressed mood 2019     Anemia      B12 deficiency anemia      Cannabis abuse 2020     Chronic infection     MRSA     Exophthalmos of both eyes 2020     Graves disease 2020     Hashimoto's disease 2020     History of blood transfusion      Hypokalemia 2020     Severe anemia 2018       PAST SURGICAL HISTORY:  Past Surgical History:   Procedure Laterality Date     BONE MARROW BIOPSY       BONE MARROW BIOPSY, BONE SPECIMEN, NEEDLE/TROCAR       C/SECTION, CLASSICAL      x2      SECTION      x2     ORIF METACARPAL FRACTURE Left 2013     ORTHOPEDIC SURGERY Left 2013    ORIF METECARPAL      THYROID BIOPSY  10/2020     THYROIDECTOMY N/A 2021    Procedure: TOTAL THYROIDECTOMY;  Surgeon: Stefani Perez MD;  Location:  OR     US BIOPSY THYROID FINE NEEDLE ASPIRATION  10/21/2020       CURRENT MEDICATIONS:    Prior to Admission Medications   Prescriptions Last Dose Informant Patient Reported? Taking?   Ferrous Bisglycinate Chelate 28 MG CAPS   No No   Sig: Take 1 capsule by mouth daily   cyanocobalamin (VITAMIN B-12) 1000 MCG tablet   No No   Sig: Take 1 tablet (1,000 mcg) by mouth daily   cyclobenzaprine (FLEXERIL) 5 MG tablet   No No   Sig: Take 1 tablet (5 mg) by mouth 2 times daily as  needed for muscle spasms   ibuprofen (ADVIL/MOTRIN) 600 MG tablet   No No   Sig: Take 1 tablet (600 mg) by mouth every 6 hours as needed for moderate pain   levothyroxine (SYNTHROID/LEVOTHROID) 137 MCG tablet   No No   Sig: Take 1 tablet (137 mcg) by mouth daily   propranolol (INDERAL) 10 MG tablet   No No   Sig: Take 1-2 tablets 2-3 times a day as needed      Facility-Administered Medications: None       ALLERGIES:  Allergies   Allergen Reactions     Blood-Group Specific Substance      Non specific antibody present; 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 A delay in compatible RBCs may occur.        Ferumoxytol Other (See Comments)     Severe back pain and spasms     Latex Swelling       FAMILY HISTORY:  Family History   Problem Relation Age of Onset     No Known Problems Mother      No Known Problems Father         unknown     No Known Problems Sister      No Known Problems Brother      No Known Problems Son      No Known Problems Daughter        SOCIAL HISTORY:  Social History     Tobacco Use     Smoking status: Some Days     Packs/day: 0.10     Types: Cigarettes     Last attempt to quit: 2021     Years since quittin.3     Passive exposure: Current     Smokeless tobacco: Never     Tobacco comments:     4-5 daily   Vaping Use     Vaping status: Never Used   Substance Use Topics     Alcohol use: No     Drug use: No     Types: Marijuana        VITALS:  Patient Vitals for the past 24 hrs:   BP Temp Temp src Pulse Resp SpO2 Weight   23 2340 118/61 -- -- 56 -- 100 % --   23 2231 114/71 97.7  F (36.5  C) Temporal 71 17 100 % 63.5 kg (140 lb)       PHYSICAL EXAM    General Appearance: Well-appearing, well-nourished, no acute distress   Head:  Normocephalic  Cardio:  Regular rate and rhythm  Pulm:  No respiratory distress  Back:   No CVA tenderness, normal ROM  Abdomen:  Soft, non distended,no rebound or guarding.  LUQ tenderness.  Extremities: Normal gait  Skin:  Skin warm, dry, no rashes  Neuro:  Alert and oriented ×3     RADIOLOGY/LABS:  Reviewed all pertinent imaging. Please see official radiology report. All pertinent labs reviewed and interpreted.    Results for orders placed or performed during the hospital encounter of 06/03/23   Extra Blue Top Tube   Result Value Ref Range    Hold Specimen JIC    Extra Red Top Tube   Result Value Ref Range    Hold Specimen JIC    Comprehensive metabolic panel   Result Value Ref Range    Sodium 136 136 - 145 mmol/L    Potassium 3.8 3.4 - 5.3 mmol/L    Chloride 102 98 - 107 mmol/L    Carbon Dioxide (CO2) 20 (L) 22 - 29 mmol/L    Anion Gap 14 7 - 15 mmol/L    Urea Nitrogen 14.5 6.0 - 20.0 mg/dL    Creatinine 0.88 0.51 - 0.95 mg/dL    Calcium 9.2 8.6 - 10.0 mg/dL    Glucose 100 (H) 70 - 99 mg/dL    Alkaline Phosphatase 36 35 - 104 U/L    AST 22 10 - 35 U/L    ALT 8 (L) 10 - 35 U/L    Protein Total 7.7 6.4 - 8.3 g/dL    Albumin 4.3 3.5 - 5.2 g/dL    Bilirubin Total 0.4 <=1.2 mg/dL    GFR Estimate 83 >60 mL/min/1.73m2   Result Value Ref Range    Lipase 19 13 - 60 U/L   CBC with platelets and differential   Result Value Ref Range    WBC Count 6.1 4.0 - 11.0 10e3/uL    RBC Count 3.37 (L) 3.80 - 5.20 10e6/uL    Hemoglobin 6.8 (LL) 11.7 - 15.7 g/dL    Hematocrit 24.6 (L) 35.0 - 47.0 %    MCV 73 (L) 78 - 100 fL    MCH 20.2 (L) 26.5 - 33.0 pg    MCHC 27.6 (L) 31.5 - 36.5 g/dL    RDW 19.4 (H) 10.0 - 15.0 %    Platelet Count 218 150 - 450 10e3/uL    % Neutrophils 66 %    % Lymphocytes 23 %    % Monocytes 9 %    % Eosinophils 0 %    % Basophils 1 %    % Immature Granulocytes 1 %    NRBCs per 100 WBC 0 <1 /100    Absolute Neutrophils 4.0 1.6 - 8.3 10e3/uL    Absolute Lymphocytes 1.4 0.8 - 5.3 10e3/uL    Absolute Monocytes 0.5 0.0 - 1.3 10e3/uL    Absolute Eosinophils 0.0 0.0 - 0.7 10e3/uL    Absolute Basophils 0.1 0.0 - 0.2 10e3/uL    Absolute Immature Granulocytes 0.1 <=0.4 10e3/uL    Absolute NRBCs  0.0 10e3/uL       The creation of this record is based on the scribe s observations of the work being performed by Dionne Arenas MD and the provider s statements to them. It was created on her behalf by Elijah Bautista, a trained medical scribe. This document has been checked and approved by the attending provider.    Dionne Arenas MD  Emergency Medicine  [facility] EMERGENCY DEPARTMENT  [address] [place] [id number]  [phone]  Dept: [phone]      Dionne Arenas MD  06/04/23 0006

## 2023-06-04 NOTE — ED TRIAGE NOTES
patient here for abdominal pain since Tuesday, she reports that she has also been throwing up. She reports that she can not keep anything down.

## 2023-06-04 NOTE — DISCHARGE INSTRUCTIONS
Zofran or Phenergan as needed for nausea, vomiting.  Would recommend stopping cannabis use.  Follow-up with primary for outpatient anemia management, iron infusion.

## 2023-06-05 ENCOUNTER — MYC MEDICAL ADVICE (OUTPATIENT)
Dept: FAMILY MEDICINE | Facility: CLINIC | Age: 43
End: 2023-06-05
Payer: COMMERCIAL

## 2023-06-06 ENCOUNTER — ANCILLARY PROCEDURE (OUTPATIENT)
Dept: GENERAL RADIOLOGY | Facility: CLINIC | Age: 43
End: 2023-06-06
Attending: PHYSICIAN ASSISTANT
Payer: COMMERCIAL

## 2023-06-06 ENCOUNTER — OFFICE VISIT (OUTPATIENT)
Dept: FAMILY MEDICINE | Facility: CLINIC | Age: 43
End: 2023-06-06
Payer: COMMERCIAL

## 2023-06-06 VITALS
HEART RATE: 82 BPM | DIASTOLIC BLOOD PRESSURE: 80 MMHG | TEMPERATURE: 97.1 F | OXYGEN SATURATION: 100 % | SYSTOLIC BLOOD PRESSURE: 122 MMHG

## 2023-06-06 DIAGNOSIS — D64.9 SEVERE ANEMIA: Primary | ICD-10-CM

## 2023-06-06 DIAGNOSIS — R11.0 NAUSEA: Primary | ICD-10-CM

## 2023-06-06 DIAGNOSIS — R07.9 CHEST PAIN, UNSPECIFIED TYPE: ICD-10-CM

## 2023-06-06 DIAGNOSIS — R07.9 CHEST PAIN, UNSPECIFIED TYPE: Primary | ICD-10-CM

## 2023-06-06 LAB
ATRIAL RATE - MUSE: 82 BPM
D DIMER PPP FEU-MCNC: 0.33 UG/ML FEU (ref 0–0.5)
DIASTOLIC BLOOD PRESSURE - MUSE: NORMAL MMHG
ERYTHROCYTE [DISTWIDTH] IN BLOOD BY AUTOMATED COUNT: 19.8 % (ref 10–15)
HCT VFR BLD AUTO: 28 % (ref 35–47)
HGB BLD-MCNC: 7.8 G/DL (ref 11.7–15.7)
INTERPRETATION ECG - MUSE: NORMAL
MCH RBC QN AUTO: 19.9 PG (ref 26.5–33)
MCHC RBC AUTO-ENTMCNC: 27.9 G/DL (ref 31.5–36.5)
MCV RBC AUTO: 72 FL (ref 78–100)
P AXIS - MUSE: 80 DEGREES
PLATELET # BLD AUTO: 357 10E3/UL (ref 150–450)
PR INTERVAL - MUSE: 152 MS
QRS DURATION - MUSE: 88 MS
QT - MUSE: 368 MS
QTC - MUSE: 429 MS
R AXIS - MUSE: 71 DEGREES
RBC # BLD AUTO: 3.91 10E6/UL (ref 3.8–5.2)
SYSTOLIC BLOOD PRESSURE - MUSE: NORMAL MMHG
T AXIS - MUSE: 69 DEGREES
VENTRICULAR RATE- MUSE: 82 BPM
WBC # BLD AUTO: 6.4 10E3/UL (ref 4–11)

## 2023-06-06 PROCEDURE — 93005 ELECTROCARDIOGRAM TRACING: CPT | Performed by: PHYSICIAN ASSISTANT

## 2023-06-06 PROCEDURE — 85027 COMPLETE CBC AUTOMATED: CPT | Performed by: PHYSICIAN ASSISTANT

## 2023-06-06 PROCEDURE — 36415 COLL VENOUS BLD VENIPUNCTURE: CPT | Performed by: PHYSICIAN ASSISTANT

## 2023-06-06 PROCEDURE — 71046 X-RAY EXAM CHEST 2 VIEWS: CPT | Mod: TC | Performed by: RADIOLOGY

## 2023-06-06 PROCEDURE — 99213 OFFICE O/P EST LOW 20 MIN: CPT | Performed by: PHYSICIAN ASSISTANT

## 2023-06-06 PROCEDURE — 85379 FIBRIN DEGRADATION QUANT: CPT | Performed by: PHYSICIAN ASSISTANT

## 2023-06-06 PROCEDURE — 82607 VITAMIN B-12: CPT | Performed by: PHYSICIAN ASSISTANT

## 2023-06-06 PROCEDURE — 93010 ELECTROCARDIOGRAM REPORT: CPT | Performed by: INTERNAL MEDICINE
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
## 2023-06-06 NOTE — PROGRESS NOTES
{PROVIDER CHARTING PREFERENCE:692382}    Joesph Nicole is a 42 year old, presenting for the following health issues:  Chest Pain        6/6/2023     2:26 PM   Additional Questions   Roomed by jude     History of Present Illness       Reason for visit:  Chest pain    She eats 0-1 servings of fruits and vegetables daily.She consumes 2 sweetened beverage(s) daily.She exercises with enough effort to increase her heart rate 9 or less minutes per day.  She exercises with enough effort to increase her heart rate 3 or less days per week. She is missing 2 dose(s) of medications per week.  She is not taking prescribed medications regularly due to side effects and remembering to take.       {SUPERLIST (Optional):231164}  {additonal problems for provider to add (Optional):027362}      Review of Systems   {ROS COMP (Optional):496745}      Objective    /80 (BP Location: Right arm, Patient Position: Sitting, Cuff Size: Adult Regular)   Pulse 82   Temp 97.1  F (36.2  C) (Temporal)   LMP 05/30/2023 (Exact Date)   SpO2 100%   There is no height or weight on file to calculate BMI.  Physical Exam   {Exam List (Optional):020382}    {Diagnostic Test Results (Optional):853571}    {AMBULATORY ATTESTATION (Optional):062479}

## 2023-06-07 VITALS
DIASTOLIC BLOOD PRESSURE: 80 MMHG | HEART RATE: 82 BPM | OXYGEN SATURATION: 100 % | TEMPERATURE: 97.1 F | SYSTOLIC BLOOD PRESSURE: 122 MMHG

## 2023-06-07 LAB — VIT B12 SERPL-MCNC: 243 PG/ML (ref 232–1245)

## 2023-06-07 NOTE — PROGRESS NOTES
Assessment & Plan     Severe anemia  -HGB is low, stable.  Has not been able to get in to do her PRBC transfusions.    -discussed FMLA to support her, she will bring me the paperwork    Melinda Giles PA-C  [facility]      Joesph Nicole is a 42 year old, presenting for the following health issues:  Anemia        5/31/2023     3:09 PM   Additional Questions   Roomed by Jami   Accompanied by self     -was in the ED over the weekend for vomiting and nausea.  This is improving  -has not eaten in a few days, no appetite    -h/o severe anemia, has not been able to go for iron infusions.      History of Present Illness       Reason for visit:  Chest pain    She eats 0-1 servings of fruits and vegetables daily.She consumes 2 sweetened beverage(s) daily.She exercises with enough effort to increase her heart rate 9 or less minutes per day.  She exercises with enough effort to increase her heart rate 3 or less days per week. She is missing 2 dose(s) of medications per week.  She is not taking prescribed medications regularly due to side effects and remembering to take.     Review of Systems   Constitutional, HEENT, cardiovascular, pulmonary, gi and gu systems are negative, except as otherwise noted.      Objective    /80 (BP Location: Right arm, Patient Position: Sitting, Cuff Size: Adult Regular)   Pulse 82   Temp 97.1  F (36.2  C) (Temporal)   LMP 05/30/2023 (Exact Date)   SpO2 100%     Physical Exam   GENERAL: healthy, alert and no distress  NECK: no adenopathy, no asymmetry, masses, or scars and thyroid normal to palpation  RESP: lungs clear to auscultation - no rales, rhonchi or wheezes  CV: regular rate and rhythm, normal S1 S2, no S3 or S4, no murmur, click or rub, no peripheral edema and peripheral pulses strong left upper chest is nontender to palpate, pain improves with palpation along the anterior ribs and sternum  ABDOMEN: soft, nontender, no  hepatosplenomegaly, no masses and bowel sounds normal  MS: no gross musculoskeletal defects noted, no edema    Admission on 06/03/2023, Discharged on 06/04/2023   Component Date Value Ref Range Status     Hold Specimen 06/03/2023 JIC   Final     Hold Specimen 06/03/2023 JI   Final     Sodium 06/03/2023 136  136 - 145 mmol/L Final     Potassium 06/03/2023 3.8  3.4 - 5.3 mmol/L Final     Chloride 06/03/2023 102  98 - 107 mmol/L Final     Carbon Dioxide (CO2) 06/03/2023 20 (L)  22 - 29 mmol/L Final     Anion Gap 06/03/2023 14  7 - 15 mmol/L Final     Urea Nitrogen 06/03/2023 14.5  6.0 - 20.0 mg/dL Final     Creatinine 06/03/2023 0.88  0.51 - 0.95 mg/dL Final     Calcium 06/03/2023 9.2  8.6 - 10.0 mg/dL Final     Glucose 06/03/2023 100 (H)  70 - 99 mg/dL Final     Alkaline Phosphatase 06/03/2023 36  35 - 104 U/L Final     AST 06/03/2023 22  10 - 35 U/L Final    Specimen is hemolyzed which can falsely elevate AST. Analysis of a non-hemolyzed specimen may result in a lower value.     ALT 06/03/2023 8 (L)  10 - 35 U/L Final     Protein Total 06/03/2023 7.7  6.4 - 8.3 g/dL Final     Albumin 06/03/2023 4.3  3.5 - 5.2 g/dL Final     Bilirubin Total 06/03/2023 0.4  <=1.2 mg/dL Final     GFR Estimate 06/03/2023 83  >60 mL/min/1.73m2 Final    eGFR calculated using 2021 CKD-EPI equation.     Lipase 06/03/2023 19  13 - 60 U/L Final     WBC Count 06/03/2023 6.1  4.0 - 11.0 10e3/uL Final     RBC Count 06/03/2023 3.37 (L)  3.80 - 5.20 10e6/uL Final     Hemoglobin 06/03/2023 6.8 (LL)  11.7 - 15.7 g/dL Final     Hematocrit 06/03/2023 24.6 (L)  35.0 - 47.0 % Final     MCV 06/03/2023 73 (L)  78 - 100 fL Final     MCH 06/03/2023 20.2 (L)  26.5 - 33.0 pg Final     MCHC 06/03/2023 27.6 (L)  31.5 - 36.5 g/dL Final     RDW 06/03/2023 19.4 (H)  10.0 - 15.0 % Final     Platelet Count 06/03/2023 218  150 - 450 10e3/uL Final     % Neutrophils 06/03/2023 66  % Final     % Lymphocytes 06/03/2023 23  % Final     % Monocytes 06/03/2023 9  % Final      % Eosinophils 06/03/2023 0  % Final     % Basophils 06/03/2023 1  % Final     % Immature Granulocytes 06/03/2023 1  % Final     NRBCs per 100 WBC 06/03/2023 0  <1 /100 Final     Absolute Neutrophils 06/03/2023 4.0  1.6 - 8.3 10e3/uL Final     Absolute Lymphocytes 06/03/2023 1.4  0.8 - 5.3 10e3/uL Final     Absolute Monocytes 06/03/2023 0.5  0.0 - 1.3 10e3/uL Final     Absolute Eosinophils 06/03/2023 0.0  0.0 - 0.7 10e3/uL Final     Absolute Basophils 06/03/2023 0.1  0.0 - 0.2 10e3/uL Final     Absolute Immature Granulocytes 06/03/2023 0.1  <=0.4 10e3/uL Final     Absolute NRBCs 06/03/2023 0.0  10e3/uL Final     No results found for this or any previous visit (from the past 24 hour(s)).
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
## 2023-06-23 ENCOUNTER — THERAPY VISIT (OUTPATIENT)
Dept: PHYSICAL THERAPY | Facility: REHABILITATION | Age: 43
End: 2023-06-23
Attending: PHYSICIAN ASSISTANT
Payer: COMMERCIAL

## 2023-06-23 DIAGNOSIS — M54.2 NECK PAIN: ICD-10-CM

## 2023-06-23 DIAGNOSIS — M54.6 ACUTE BILATERAL THORACIC BACK PAIN: ICD-10-CM

## 2023-06-23 DIAGNOSIS — M54.50 ACUTE BILATERAL LOW BACK PAIN WITHOUT SCIATICA: ICD-10-CM

## 2023-06-23 DIAGNOSIS — M54.50 BILATERAL LOW BACK PAIN WITHOUT SCIATICA: Primary | ICD-10-CM
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
## 2023-06-23 PROCEDURE — 97161 PT EVAL LOW COMPLEX 20 MIN: CPT | Mod: GP

## 2023-06-23 PROCEDURE — 97110 THERAPEUTIC EXERCISES: CPT | Mod: GP
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
## 2023-06-26 ENCOUNTER — MYC MEDICAL ADVICE (OUTPATIENT)
Dept: FAMILY MEDICINE | Facility: CLINIC | Age: 43
End: 2023-06-26
Payer: COMMERCIAL

## 2023-06-26 DIAGNOSIS — D64.9 SEVERE ANEMIA: ICD-10-CM

## 2023-06-26 DIAGNOSIS — D64.9 ANEMIA REQUIRING TRANSFUSIONS: Primary | ICD-10-CM

## 2023-06-26 RX ORDER — HEPARIN SODIUM (PORCINE) LOCK FLUSH IV SOLN 100 UNIT/ML 100 UNIT/ML
5 SOLUTION INTRAVENOUS
Status: CANCELLED | OUTPATIENT
Start: 2023-06-26

## 2023-06-26 RX ORDER — EPINEPHRINE 1 MG/ML
0.3 INJECTION, SOLUTION, CONCENTRATE INTRAVENOUS EVERY 5 MIN PRN
Status: CANCELLED | OUTPATIENT
Start: 2023-06-26

## 2023-06-26 RX ORDER — HEPARIN SODIUM,PORCINE 10 UNIT/ML
5-20 VIAL (ML) INTRAVENOUS DAILY PRN
Status: CANCELLED | OUTPATIENT
Start: 2023-06-26

## 2023-06-26 RX ORDER — ALBUTEROL SULFATE 90 UG/1
1-2 AEROSOL, METERED RESPIRATORY (INHALATION)
Status: CANCELLED
Start: 2023-06-26

## 2023-06-26 RX ORDER — MEPERIDINE HYDROCHLORIDE 25 MG/ML
25 INJECTION INTRAMUSCULAR; INTRAVENOUS; SUBCUTANEOUS EVERY 30 MIN PRN
Status: CANCELLED | OUTPATIENT
Start: 2023-06-26

## 2023-06-26 RX ORDER — METHYLPREDNISOLONE SODIUM SUCCINATE 125 MG/2ML
125 INJECTION, POWDER, LYOPHILIZED, FOR SOLUTION INTRAMUSCULAR; INTRAVENOUS
Status: CANCELLED
Start: 2023-06-26

## 2023-06-26 RX ORDER — ALBUTEROL SULFATE 0.83 MG/ML
2.5 SOLUTION RESPIRATORY (INHALATION)
Status: CANCELLED | OUTPATIENT
Start: 2023-06-26

## 2023-06-26 RX ORDER — DIPHENHYDRAMINE HYDROCHLORIDE 50 MG/ML
50 INJECTION INTRAMUSCULAR; INTRAVENOUS
Status: CANCELLED
Start: 2023-06-26

## 2023-07-21 ENCOUNTER — THERAPY VISIT (OUTPATIENT)
Dept: PHYSICAL THERAPY | Facility: REHABILITATION | Age: 43
End: 2023-07-21
Payer: COMMERCIAL

## 2023-07-21 DIAGNOSIS — M54.2 NECK PAIN: ICD-10-CM

## 2023-07-21 DIAGNOSIS — M54.50 BILATERAL LOW BACK PAIN WITHOUT SCIATICA: Primary | ICD-10-CM

## 2023-07-21 PROCEDURE — 97140 MANUAL THERAPY 1/> REGIONS: CPT | Mod: GP

## 2023-07-21 PROCEDURE — 97110 THERAPEUTIC EXERCISES: CPT | Mod: GP

## 2023-07-24 ENCOUNTER — INFUSION THERAPY VISIT (OUTPATIENT)
Dept: INFUSION THERAPY | Facility: HOSPITAL | Age: 43
End: 2023-07-24
Payer: COMMERCIAL

## 2023-07-24 VITALS
OXYGEN SATURATION: 100 % | HEART RATE: 62 BPM | SYSTOLIC BLOOD PRESSURE: 98 MMHG | RESPIRATION RATE: 16 BRPM | TEMPERATURE: 98.2 F | DIASTOLIC BLOOD PRESSURE: 58 MMHG

## 2023-07-24 DIAGNOSIS — D64.9 ANEMIA REQUIRING TRANSFUSIONS: ICD-10-CM

## 2023-07-24 DIAGNOSIS — D64.9 SEVERE ANEMIA: Primary | ICD-10-CM

## 2023-07-24 PROCEDURE — 258N000003 HC RX IP 258 OP 636: Performed by: PHYSICIAN ASSISTANT

## 2023-07-24 PROCEDURE — 96366 THER/PROPH/DIAG IV INF ADDON: CPT

## 2023-07-24 PROCEDURE — 96365 THER/PROPH/DIAG IV INF INIT: CPT

## 2023-07-24 PROCEDURE — 250N000011 HC RX IP 250 OP 636: Mod: JZ | Performed by: PHYSICIAN ASSISTANT

## 2023-07-24 RX ORDER — DIPHENHYDRAMINE HYDROCHLORIDE 50 MG/ML
50 INJECTION INTRAMUSCULAR; INTRAVENOUS
Status: DISCONTINUED | OUTPATIENT
Start: 2023-07-24 | End: 2023-07-24 | Stop reason: HOSPADM

## 2023-07-24 RX ORDER — ALBUTEROL SULFATE 0.83 MG/ML
2.5 SOLUTION RESPIRATORY (INHALATION)
Status: DISCONTINUED | OUTPATIENT
Start: 2023-07-24 | End: 2023-07-24 | Stop reason: HOSPADM

## 2023-07-24 RX ORDER — METHYLPREDNISOLONE SODIUM SUCCINATE 125 MG/2ML
125 INJECTION, POWDER, LYOPHILIZED, FOR SOLUTION INTRAMUSCULAR; INTRAVENOUS
Status: CANCELLED
Start: 2023-07-25

## 2023-07-24 RX ORDER — EPINEPHRINE 1 MG/ML
0.3 INJECTION, SOLUTION INTRAMUSCULAR; SUBCUTANEOUS EVERY 5 MIN PRN
Status: DISCONTINUED | OUTPATIENT
Start: 2023-07-24 | End: 2023-07-24 | Stop reason: HOSPADM

## 2023-07-24 RX ORDER — MEPERIDINE HYDROCHLORIDE 25 MG/ML
25 INJECTION INTRAMUSCULAR; INTRAVENOUS; SUBCUTANEOUS EVERY 30 MIN PRN
Status: DISCONTINUED | OUTPATIENT
Start: 2023-07-24 | End: 2023-07-24 | Stop reason: HOSPADM

## 2023-07-24 RX ORDER — METHYLPREDNISOLONE SODIUM SUCCINATE 125 MG/2ML
125 INJECTION, POWDER, LYOPHILIZED, FOR SOLUTION INTRAMUSCULAR; INTRAVENOUS
Status: DISCONTINUED | OUTPATIENT
Start: 2023-07-24 | End: 2023-07-24 | Stop reason: HOSPADM

## 2023-07-24 RX ORDER — DIPHENHYDRAMINE HYDROCHLORIDE 50 MG/ML
50 INJECTION INTRAMUSCULAR; INTRAVENOUS
Status: CANCELLED
Start: 2023-07-25

## 2023-07-24 RX ORDER — MEPERIDINE HYDROCHLORIDE 25 MG/ML
25 INJECTION INTRAMUSCULAR; INTRAVENOUS; SUBCUTANEOUS EVERY 30 MIN PRN
Status: CANCELLED | OUTPATIENT
Start: 2023-07-25

## 2023-07-24 RX ORDER — EPINEPHRINE 1 MG/ML
0.3 INJECTION, SOLUTION INTRAMUSCULAR; SUBCUTANEOUS EVERY 5 MIN PRN
Status: CANCELLED | OUTPATIENT
Start: 2023-07-25

## 2023-07-24 RX ORDER — ALBUTEROL SULFATE 90 UG/1
1-2 AEROSOL, METERED RESPIRATORY (INHALATION)
Status: CANCELLED
Start: 2023-07-25

## 2023-07-24 RX ORDER — HEPARIN SODIUM (PORCINE) LOCK FLUSH IV SOLN 100 UNIT/ML 100 UNIT/ML
5 SOLUTION INTRAVENOUS
Status: CANCELLED | OUTPATIENT
Start: 2023-07-25

## 2023-07-24 RX ORDER — ALBUTEROL SULFATE 90 UG/1
1-2 AEROSOL, METERED RESPIRATORY (INHALATION)
Status: DISCONTINUED | OUTPATIENT
Start: 2023-07-24 | End: 2023-07-24 | Stop reason: HOSPADM

## 2023-07-24 RX ORDER — ALBUTEROL SULFATE 0.83 MG/ML
2.5 SOLUTION RESPIRATORY (INHALATION)
Status: CANCELLED | OUTPATIENT
Start: 2023-07-25

## 2023-07-24 RX ORDER — HEPARIN SODIUM,PORCINE 10 UNIT/ML
5-20 VIAL (ML) INTRAVENOUS DAILY PRN
Status: CANCELLED | OUTPATIENT
Start: 2023-07-25

## 2023-07-24 RX ADMIN — IRON SUCROSE 300 MG: 20 INJECTION, SOLUTION INTRAVENOUS at 11:33

## 2023-07-24 RX ADMIN — SODIUM CHLORIDE 250 ML: 9 INJECTION, SOLUTION INTRAVENOUS at 11:29

## 2023-07-24 NOTE — PROGRESS NOTES
Infusion Nursing Note:  Bonnie Webster presents today for #1/3 venofer 300 mg infusions.    Patient seen by provider today: No   present during visit today: Not Applicable.    Note: Bonnie arrived ambulatory in stable condition. Printed Venofer information sheet given to and reviewed with Bonnie. She has had a previous reaction to Feraheme. She tolerated Venofer over 90 minutes with no side effects. Monitored for 30 minutes post infusion. Vital signs stable. She will return 8/11/23 for her second dose of Venofer.      Intravenous Access:  Peripheral IV placed.    Treatment Conditions:  Not Applicable.      Post Infusion Assessment:  Patient tolerated infusion without incident.  Site patent and intact, free from redness, edema or discomfort.  Access discontinued per protocol.       Discharge Plan:   Patient discharged in stable condition accompanied by: .  Departure Mode: Ambulatory.      Carrie Lr RN

## 2023-08-03 ENCOUNTER — TELEPHONE (OUTPATIENT)
Dept: PHYSICAL THERAPY | Facility: REHABILITATION | Age: 43
End: 2023-08-03
Payer: COMMERCIAL
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
## 2023-08-11 ENCOUNTER — INFUSION THERAPY VISIT (OUTPATIENT)
Dept: INFUSION THERAPY | Facility: HOSPITAL | Age: 43
End: 2023-08-11
Attending: PHYSICIAN ASSISTANT
Payer: COMMERCIAL

## 2023-08-11 ENCOUNTER — THERAPY VISIT (OUTPATIENT)
Dept: PHYSICAL THERAPY | Facility: REHABILITATION | Age: 43
End: 2023-08-11
Payer: COMMERCIAL

## 2023-08-11 VITALS
OXYGEN SATURATION: 100 % | RESPIRATION RATE: 16 BRPM | TEMPERATURE: 97.9 F | DIASTOLIC BLOOD PRESSURE: 60 MMHG | SYSTOLIC BLOOD PRESSURE: 109 MMHG | HEART RATE: 64 BPM

## 2023-08-11 DIAGNOSIS — D64.9 SEVERE ANEMIA: Primary | ICD-10-CM

## 2023-08-11 DIAGNOSIS — M54.50 BILATERAL LOW BACK PAIN WITHOUT SCIATICA: Primary | ICD-10-CM

## 2023-08-11 DIAGNOSIS — M54.50 ACUTE BILATERAL LOW BACK PAIN WITHOUT SCIATICA: ICD-10-CM

## 2023-08-11 DIAGNOSIS — D64.9 ANEMIA REQUIRING TRANSFUSIONS: ICD-10-CM

## 2023-08-11 DIAGNOSIS — M54.2 NECK PAIN: ICD-10-CM

## 2023-08-11 PROCEDURE — 250N000011 HC RX IP 250 OP 636: Mod: JZ | Performed by: PHYSICIAN ASSISTANT

## 2023-08-11 PROCEDURE — 96366 THER/PROPH/DIAG IV INF ADDON: CPT

## 2023-08-11 PROCEDURE — 96365 THER/PROPH/DIAG IV INF INIT: CPT

## 2023-08-11 PROCEDURE — 97140 MANUAL THERAPY 1/> REGIONS: CPT | Mod: GP

## 2023-08-11 PROCEDURE — 97110 THERAPEUTIC EXERCISES: CPT | Mod: GP

## 2023-08-11 PROCEDURE — 258N000003 HC RX IP 258 OP 636: Performed by: PHYSICIAN ASSISTANT

## 2023-08-11 RX ORDER — DIPHENHYDRAMINE HYDROCHLORIDE 50 MG/ML
50 INJECTION INTRAMUSCULAR; INTRAVENOUS
Status: DISCONTINUED | OUTPATIENT
Start: 2023-08-11 | End: 2023-08-11 | Stop reason: HOSPADM

## 2023-08-11 RX ORDER — ALBUTEROL SULFATE 0.83 MG/ML
2.5 SOLUTION RESPIRATORY (INHALATION)
Status: DISCONTINUED | OUTPATIENT
Start: 2023-08-11 | End: 2023-08-11 | Stop reason: HOSPADM

## 2023-08-11 RX ORDER — ALBUTEROL SULFATE 90 UG/1
1-2 AEROSOL, METERED RESPIRATORY (INHALATION)
Status: CANCELLED
Start: 2023-08-12

## 2023-08-11 RX ORDER — HEPARIN SODIUM,PORCINE 10 UNIT/ML
5-20 VIAL (ML) INTRAVENOUS DAILY PRN
Status: CANCELLED | OUTPATIENT
Start: 2023-08-12

## 2023-08-11 RX ORDER — METHYLPREDNISOLONE SODIUM SUCCINATE 125 MG/2ML
125 INJECTION, POWDER, LYOPHILIZED, FOR SOLUTION INTRAMUSCULAR; INTRAVENOUS
Status: DISCONTINUED | OUTPATIENT
Start: 2023-08-11 | End: 2023-08-11 | Stop reason: HOSPADM

## 2023-08-11 RX ORDER — MEPERIDINE HYDROCHLORIDE 25 MG/ML
25 INJECTION INTRAMUSCULAR; INTRAVENOUS; SUBCUTANEOUS EVERY 30 MIN PRN
Status: DISCONTINUED | OUTPATIENT
Start: 2023-08-11 | End: 2023-08-11 | Stop reason: HOSPADM

## 2023-08-11 RX ORDER — EPINEPHRINE 1 MG/ML
0.3 INJECTION, SOLUTION INTRAMUSCULAR; SUBCUTANEOUS EVERY 5 MIN PRN
Status: CANCELLED | OUTPATIENT
Start: 2023-08-12

## 2023-08-11 RX ORDER — EPINEPHRINE 1 MG/ML
0.3 INJECTION, SOLUTION INTRAMUSCULAR; SUBCUTANEOUS EVERY 5 MIN PRN
Status: DISCONTINUED | OUTPATIENT
Start: 2023-08-11 | End: 2023-08-11 | Stop reason: HOSPADM

## 2023-08-11 RX ORDER — METHYLPREDNISOLONE SODIUM SUCCINATE 125 MG/2ML
125 INJECTION, POWDER, LYOPHILIZED, FOR SOLUTION INTRAMUSCULAR; INTRAVENOUS
Status: CANCELLED
Start: 2023-08-12

## 2023-08-11 RX ORDER — HEPARIN SODIUM (PORCINE) LOCK FLUSH IV SOLN 100 UNIT/ML 100 UNIT/ML
5 SOLUTION INTRAVENOUS
Status: CANCELLED | OUTPATIENT
Start: 2023-08-12

## 2023-08-11 RX ORDER — DIPHENHYDRAMINE HYDROCHLORIDE 50 MG/ML
50 INJECTION INTRAMUSCULAR; INTRAVENOUS
Status: CANCELLED
Start: 2023-08-12

## 2023-08-11 RX ORDER — MEPERIDINE HYDROCHLORIDE 25 MG/ML
25 INJECTION INTRAMUSCULAR; INTRAVENOUS; SUBCUTANEOUS EVERY 30 MIN PRN
Status: CANCELLED | OUTPATIENT
Start: 2023-08-12

## 2023-08-11 RX ORDER — ALBUTEROL SULFATE 0.83 MG/ML
2.5 SOLUTION RESPIRATORY (INHALATION)
Status: CANCELLED | OUTPATIENT
Start: 2023-08-12

## 2023-08-11 RX ORDER — ALBUTEROL SULFATE 90 UG/1
1-2 AEROSOL, METERED RESPIRATORY (INHALATION)
Status: DISCONTINUED | OUTPATIENT
Start: 2023-08-11 | End: 2023-08-11 | Stop reason: HOSPADM

## 2023-08-11 RX ADMIN — SODIUM CHLORIDE 250 ML: 9 INJECTION, SOLUTION INTRAVENOUS at 11:39

## 2023-08-11 RX ADMIN — IRON SUCROSE 300 MG: 20 INJECTION, SOLUTION INTRAVENOUS at 11:41

## 2023-08-11 ASSESSMENT — PAIN SCALES - GENERAL: PAINLEVEL: NO PAIN (0)

## 2023-08-11 NOTE — PROGRESS NOTES
Infusion Nursing Note:  Bonnie Webster presents today for 2/3 venofer 300 mg infusions.    Patient seen by provider today: No   present during visit today: Not Applicable.    Note: Bonnie arrived ambulatory in stable condition into the infusion center accompanied by her  , VSS. Plan of care reviewed with Bonnie, she verbalized understanding of her plan of care and return to clinic. She acknowledged she tolerated the first dose well with no reaction or ill effect.  Intravenous Access:  Peripheral IV placed.    Treatment Conditions:  Not Applicable.    Post Infusion Assessment:  Patient tolerated infusion without incident.  Site patent and intact, free from redness, edema or discomfort.  Access discontinued per protocol.     Discharge Plan:   Patient discharged in stable condition accompanied by: .  Departure Mode: Ambulatory.      Es Krueger RN

## 2023-08-14 ENCOUNTER — INFUSION THERAPY VISIT (OUTPATIENT)
Dept: INFUSION THERAPY | Facility: HOSPITAL | Age: 43
End: 2023-08-14
Attending: PHYSICIAN ASSISTANT
Payer: COMMERCIAL

## 2023-08-14 VITALS
HEART RATE: 66 BPM | OXYGEN SATURATION: 100 % | DIASTOLIC BLOOD PRESSURE: 60 MMHG | SYSTOLIC BLOOD PRESSURE: 91 MMHG | RESPIRATION RATE: 16 BRPM | TEMPERATURE: 98.5 F

## 2023-08-14 DIAGNOSIS — D64.9 SEVERE ANEMIA: Primary | ICD-10-CM

## 2023-08-14 DIAGNOSIS — D64.9 ANEMIA REQUIRING TRANSFUSIONS: ICD-10-CM

## 2023-08-14 PROCEDURE — 258N000003 HC RX IP 258 OP 636: Performed by: PHYSICIAN ASSISTANT

## 2023-08-14 PROCEDURE — 250N000011 HC RX IP 250 OP 636: Mod: JZ | Performed by: PHYSICIAN ASSISTANT

## 2023-08-14 PROCEDURE — 96366 THER/PROPH/DIAG IV INF ADDON: CPT

## 2023-08-14 PROCEDURE — 96365 THER/PROPH/DIAG IV INF INIT: CPT

## 2023-08-14 RX ORDER — HEPARIN SODIUM,PORCINE 10 UNIT/ML
5-20 VIAL (ML) INTRAVENOUS DAILY PRN
Status: CANCELLED | OUTPATIENT
Start: 2023-08-15

## 2023-08-14 RX ORDER — EPINEPHRINE 1 MG/ML
0.3 INJECTION, SOLUTION INTRAMUSCULAR; SUBCUTANEOUS EVERY 5 MIN PRN
Status: DISCONTINUED | OUTPATIENT
Start: 2023-08-14 | End: 2023-08-14

## 2023-08-14 RX ORDER — MEPERIDINE HYDROCHLORIDE 25 MG/ML
25 INJECTION INTRAMUSCULAR; INTRAVENOUS; SUBCUTANEOUS EVERY 30 MIN PRN
Status: DISCONTINUED | OUTPATIENT
Start: 2023-08-14 | End: 2023-08-14

## 2023-08-14 RX ORDER — ALBUTEROL SULFATE 90 UG/1
1-2 AEROSOL, METERED RESPIRATORY (INHALATION)
Status: DISCONTINUED | OUTPATIENT
Start: 2023-08-14 | End: 2023-08-14

## 2023-08-14 RX ORDER — ALBUTEROL SULFATE 90 UG/1
1-2 AEROSOL, METERED RESPIRATORY (INHALATION)
Status: CANCELLED
Start: 2023-08-15

## 2023-08-14 RX ORDER — METHYLPREDNISOLONE SODIUM SUCCINATE 125 MG/2ML
125 INJECTION, POWDER, LYOPHILIZED, FOR SOLUTION INTRAMUSCULAR; INTRAVENOUS
Status: CANCELLED
Start: 2023-08-15

## 2023-08-14 RX ORDER — ALBUTEROL SULFATE 0.83 MG/ML
2.5 SOLUTION RESPIRATORY (INHALATION)
Status: DISCONTINUED | OUTPATIENT
Start: 2023-08-14 | End: 2023-08-14

## 2023-08-14 RX ORDER — HEPARIN SODIUM (PORCINE) LOCK FLUSH IV SOLN 100 UNIT/ML 100 UNIT/ML
5 SOLUTION INTRAVENOUS
Status: CANCELLED | OUTPATIENT
Start: 2023-08-15

## 2023-08-14 RX ORDER — DIPHENHYDRAMINE HYDROCHLORIDE 50 MG/ML
50 INJECTION INTRAMUSCULAR; INTRAVENOUS
Status: CANCELLED
Start: 2023-08-15

## 2023-08-14 RX ORDER — METHYLPREDNISOLONE SODIUM SUCCINATE 125 MG/2ML
125 INJECTION, POWDER, LYOPHILIZED, FOR SOLUTION INTRAMUSCULAR; INTRAVENOUS
Status: DISCONTINUED | OUTPATIENT
Start: 2023-08-14 | End: 2023-08-14

## 2023-08-14 RX ORDER — EPINEPHRINE 1 MG/ML
0.3 INJECTION, SOLUTION INTRAMUSCULAR; SUBCUTANEOUS EVERY 5 MIN PRN
Status: CANCELLED | OUTPATIENT
Start: 2023-08-15

## 2023-08-14 RX ORDER — ALBUTEROL SULFATE 0.83 MG/ML
2.5 SOLUTION RESPIRATORY (INHALATION)
Status: CANCELLED | OUTPATIENT
Start: 2023-08-15

## 2023-08-14 RX ORDER — MEPERIDINE HYDROCHLORIDE 25 MG/ML
25 INJECTION INTRAMUSCULAR; INTRAVENOUS; SUBCUTANEOUS EVERY 30 MIN PRN
Status: CANCELLED | OUTPATIENT
Start: 2023-08-15

## 2023-08-14 RX ORDER — DIPHENHYDRAMINE HYDROCHLORIDE 50 MG/ML
50 INJECTION INTRAMUSCULAR; INTRAVENOUS
Status: DISCONTINUED | OUTPATIENT
Start: 2023-08-14 | End: 2023-08-14

## 2023-08-14 RX ADMIN — IRON SUCROSE 300 MG: 20 INJECTION, SOLUTION INTRAVENOUS at 12:09

## 2023-08-14 RX ADMIN — SODIUM CHLORIDE 250 ML: 9 INJECTION, SOLUTION INTRAVENOUS at 11:47

## 2023-08-14 ASSESSMENT — PAIN SCALES - GENERAL: PAINLEVEL: NO PAIN (0)

## 2023-08-14 NOTE — PROGRESS NOTES
Infusion Nursing Note:  Bonnie Webster presents today for 3/3 venofer 300 mg infusions.    Patient seen by provider today: No   present during visit today: Not Applicable.    Note: Bonnie arrived ambulatory in stable condition into the infusion center accompanied by her   for 3/3 of iron infusion, VSS. Plan of care reviewed with Bonnie, she verbalized understanding of her plan of care. She acknowledged she tolerated the previous doses well with no reaction or ill effect and knows this is the last dose at this time  Intravenous Access:  Peripheral IV placed.    Treatment Conditions:  Not Applicable.    Post Infusion Assessment:  Patient tolerated infusion without incident.  Site patent and intact, free from redness, edema or discomfort.  Access discontinued per protocol.     Discharge Plan:   Patient discharged in stable condition accompanied by: .  Departure Mode: Ambulatory.    Es Krueger RN

## 2023-08-18 ENCOUNTER — THERAPY VISIT (OUTPATIENT)
Dept: PHYSICAL THERAPY | Facility: REHABILITATION | Age: 43
End: 2023-08-18
Payer: COMMERCIAL

## 2023-08-18 DIAGNOSIS — M54.50 BILATERAL LOW BACK PAIN WITHOUT SCIATICA: Primary | ICD-10-CM

## 2023-08-18 DIAGNOSIS — M54.50 ACUTE BILATERAL LOW BACK PAIN WITHOUT SCIATICA: ICD-10-CM

## 2023-08-18 DIAGNOSIS — M54.2 NECK PAIN: ICD-10-CM

## 2023-08-18 PROCEDURE — 97110 THERAPEUTIC EXERCISES: CPT | Mod: GP

## 2023-08-18 PROCEDURE — 97112 NEUROMUSCULAR REEDUCATION: CPT | Mod: GP

## 2023-09-29 ENCOUNTER — THERAPY VISIT (OUTPATIENT)
Dept: PHYSICAL THERAPY | Facility: REHABILITATION | Age: 43
End: 2023-09-29

## 2023-09-29 DIAGNOSIS — M54.2 NECK PAIN: ICD-10-CM
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
## 2023-09-29 DIAGNOSIS — M54.6 ACUTE BILATERAL THORACIC BACK PAIN: ICD-10-CM

## 2023-09-29 DIAGNOSIS — M54.50 ACUTE BILATERAL LOW BACK PAIN WITHOUT SCIATICA: ICD-10-CM

## 2023-09-29 DIAGNOSIS — M54.50 BILATERAL LOW BACK PAIN WITHOUT SCIATICA: Primary | ICD-10-CM

## 2023-09-29 PROCEDURE — 97140 MANUAL THERAPY 1/> REGIONS: CPT | Mod: GP

## 2023-09-29 PROCEDURE — 97110 THERAPEUTIC EXERCISES: CPT | Mod: GP

## 2023-10-25 ENCOUNTER — OFFICE VISIT (OUTPATIENT)
Dept: FAMILY MEDICINE | Facility: CLINIC | Age: 43
End: 2023-10-25

## 2023-10-25 VITALS — WEIGHT: 154 LBS | BODY MASS INDEX: 23.42 KG/M2 | DIASTOLIC BLOOD PRESSURE: 64 MMHG | SYSTOLIC BLOOD PRESSURE: 112 MMHG

## 2023-10-25 DIAGNOSIS — N92.0 MENORRHAGIA WITH REGULAR CYCLE: ICD-10-CM

## 2023-10-25 DIAGNOSIS — E87.6 HYPOKALEMIA: Primary | ICD-10-CM

## 2023-10-25 DIAGNOSIS — E06.3 HASHIMOTO'S DISEASE: ICD-10-CM

## 2023-10-25 DIAGNOSIS — M79.672 BILATERAL FOOT PAIN: ICD-10-CM

## 2023-10-25 DIAGNOSIS — Z12.31 ENCOUNTER FOR SCREENING MAMMOGRAM FOR MALIGNANT NEOPLASM OF BREAST: ICD-10-CM

## 2023-10-25 DIAGNOSIS — F33.9 RECURRENT MAJOR DEPRESSIVE DISORDER, REMISSION STATUS UNSPECIFIED (H): ICD-10-CM

## 2023-10-25 DIAGNOSIS — M79.89 BILATERAL SWELLING OF FEET: ICD-10-CM

## 2023-10-25 DIAGNOSIS — M79.671 BILATERAL FOOT PAIN: ICD-10-CM

## 2023-10-25 DIAGNOSIS — D64.9 ANEMIA REQUIRING TRANSFUSIONS: ICD-10-CM

## 2023-10-25 PROCEDURE — 99214 OFFICE O/P EST MOD 30 MIN: CPT | Performed by: PHYSICIAN ASSISTANT

## 2023-10-25 ASSESSMENT — PATIENT HEALTH QUESTIONNAIRE - PHQ9
SUM OF ALL RESPONSES TO PHQ QUESTIONS 1-9: 13
SUM OF ALL RESPONSES TO PHQ QUESTIONS 1-9: 13
10. IF YOU CHECKED OFF ANY PROBLEMS, HOW DIFFICULT HAVE THESE PROBLEMS MADE IT FOR YOU TO DO YOUR WORK, TAKE CARE OF THINGS AT HOME, OR GET ALONG WITH OTHER PEOPLE: VERY DIFFICULT

## 2023-10-25 NOTE — PROGRESS NOTES
Assessment & Plan     Hypokalemia  -will recheck today    Recurrent major depressive disorder, remission status unspecified (H24)  -stable    Anemia requiring transfusions  -recheck hgb, ferritin today  -referral to GYN due to DUB/menorrhagia  - Ob/Gyn  Referral; Future  - CBC with platelets; Future  - Ferritin; Future  - Methylmalonic Acid; Future    Bilateral foot pain  -unclear  -r/o gout  -recommend seeing rheumatology at some point.    - Uric acid; Future    Bilateral swelling of feet  - Basic metabolic panel  (Ca, Cl, CO2, Creat, Gluc, K, Na, BUN); Future  - Anti Nuclear Sahra IgG by IFA with Reflex; Future  - ESR: Erythrocyte sedimentation rate; Future  - UA with Microscopic reflex to Culture - Clinic Collect    Menorrhagia with regular cycle  - Ob/Gyn  Referral; Future    Encounter for screening mammogram for malignant neoplasm of breast  - *MA Screening Digital Bilateral; Future    Hashimoto's disease  -rechec TSH today.  Has been taking meds consistently!  I will notify pt of results when available   - TSH with free T4 reflex; Future    35 minutes spent by me on the date of the encounter doing chart review, review of test results, interpretation of tests, patient visit, and documentation      Depression Screening Follow Up        10/25/2023     2:16 PM   PHQ   PHQ-9 Total Score 13   Q9: Thoughts of better off dead/self-harm past 2 weeks Not at all       Follow Up Actions Taken  Patient counseled, no additional follow up at this time.     Melinda Giles PA-C  [facility]    Joesph Nicole is a 43 year old, presenting for the following health issues:  No chief complaint on file.      -doing ok.  Feeling better, taking meds regularly.  Due to recheck TSH  -weight is up!      -is getting iron infusions.  Due to recheck HGB  -continues to have very heavy periods.  Likely cause of her anemia    -complains of intermittent swelling of there feet.  Limited to  the feet.    -pain throughout the foot  -no redness, no injury  -unclear what causes the swelling and pain    History of Present Illness       Reason for visit:  Foot pain and swelling    She eats 4 or more servings of fruits and vegetables daily.She consumes 2 sweetened beverage(s) daily.She exercises with enough effort to increase her heart rate 60 or more minutes per day.  She exercises with enough effort to increase her heart rate 5 days per week.   She is taking medications regularly.       Review of Systems   Constitutional, HEENT, cardiovascular, pulmonary, gi and gu systems are negative, except as otherwise noted.      Objective    /64   Wt 69.9 kg (154 lb)   LMP 09/30/2023 (Exact Date)   BMI 23.42 kg/m    Body mass index is 23.42 kg/m .  Physical Exam   GENERAL: healthy, alert and no distress  NECK: no adenopathy, no asymmetry, masses, or scars and thyroid normal to palpation  RESP: lungs clear to auscultation - no rales, rhonchi or wheezes  CV: regular rate and rhythm, normal S1 S2, no S3 or S4, no murmur, click or rub, no peripheral edema and peripheral pulses strong  ABDOMEN: soft, nontender, no hepatosplenomegaly, no masses and bowel sounds normal  MS: no gross musculoskeletal defects noted, no edema                 Answers submitted by the patient for this visit:  Patient Health Questionnaire (Submitted on 10/25/2023)  If you checked off any problems, how difficult have these problems made it for you to do your work, take care of things at home, or get along with other people?: Very difficult  PHQ9 TOTAL SCORE: 13  General Questionnaire (Submitted on 10/25/2023)  Chief Complaint: Chronic problems general questions HPI Form  What is the reason for your visit today? : foot pain and swelling  How many servings of fruits and vegetables do you eat daily?: 4 or more  On average, how many sweetened beverages do you drink each day (Examples: soda, juice, sweet tea, etc.  Do NOT count diet or  artificially sweetened beverages)?: 2  How many minutes a day do you exercise enough to make your heart beat faster?: 60 or more  How many days a week do you exercise enough to make your heart beat faster?: 5  How many days per week do you miss taking your medication?: 0

## 2023-10-25 NOTE — COMMUNITY RESOURCES LIST (ENGLISH)
10/25/2023   [facility]  N/A  For questions about this resource list or additional care needs, please contact your primary care clinic or care manager.  Phone: [phone]   Email: N/A   Address: [address] [place] [id number]   Hours: N/A        Financial Stability       Rent and mortgage payment assistance  1  [place] [employer] - Section 811 Supportive Housing Project-Based Rental Assistance Program (811 PRA) Distance: 0.86 miles      Phone/Virtual   [address] [place] [address]  Language: English  Hours: Mon - Fri 8:00 AM - 5:00 PM Appt. Only  Fees: Free   Phone: (902) 915-4324 Email: [email or web address]. Website: https://www.[employer].gov/index.html     2  [facility] - Security Deposit Assistance Distance: 1.18 miles      In-Person   [address] [place] [address]  Language: English  Hours: Mon - Fri 8:00 AM - 4:30 PM  Fees: Free   Phone: (579) 678-4640 Email: jabari@[email or web address]. Website: https://www.[facility]./your-government/departments/health-and-wellness     Utility payment assistance  3  [facility] Distance: 0.36 miles      Phone/Virtual   [address] [place] [address]  Language: English, Hmong, New, Kazakh  Hours: Mon - Fri 10:00 AM - 3:00 PM  Fees: Free   Phone: (747) 775-8215 Email: [email or web address] Website: http://[facility].org/[facility]/cg-xwhd-n-[address]/     4  [place] [employer] - [employer] and Utilities Distance: 1.11 miles      In-Person, Phone/Virtual   [address] [place] [address]  Language: English  Hours: Mon - Fri 8:30 AM - 4:30 PM  Fees: Free   Phone: (734) 562-1026 Website: https://[place].[facility]/[employer]/energy/consumer-assistance/energy-assistance-program/          Important Numbers & Websites       Emergency Services   911  [facility]   311  Poison  Control   (948) 721-3006  Suicide Prevention Lifeline   (528) 679-5842 (TALK)  Child Abuse Hotline   (119) 201-2291 (4-A-Child)  Sexual Assault Hotline   (789) 112-4212 (HOPE)  National Runaway Safeline   (801) 735-8596 (RUNAWAY)  All-Options Talkline   (923) 495-3429  Substance Abuse Referral   (171) 785-2075 (HELP)

## 2023-10-30 ENCOUNTER — LAB (OUTPATIENT)
Dept: LAB | Facility: CLINIC | Age: 43
End: 2023-10-30

## 2023-10-30 DIAGNOSIS — M79.671 BILATERAL FOOT PAIN: ICD-10-CM

## 2023-10-30 DIAGNOSIS — R60.0 BILATERAL LEG EDEMA: ICD-10-CM

## 2023-10-30 DIAGNOSIS — M79.89 BILATERAL SWELLING OF FEET: ICD-10-CM

## 2023-10-30 DIAGNOSIS — D64.9 ANEMIA REQUIRING TRANSFUSIONS: ICD-10-CM

## 2023-10-30 DIAGNOSIS — E06.3 HASHIMOTO'S DISEASE: ICD-10-CM

## 2023-10-30 DIAGNOSIS — M79.672 BILATERAL FOOT PAIN: ICD-10-CM

## 2023-10-30 LAB
ALBUMIN SERPL BCG-MCNC: 4.4 G/DL (ref 3.5–5.2)
ALP SERPL-CCNC: 38 U/L (ref 35–104)
ALT SERPL W P-5'-P-CCNC: 16 U/L (ref 0–50)
ANION GAP SERPL CALCULATED.3IONS-SCNC: 10 MMOL/L (ref 7–15)
AST SERPL W P-5'-P-CCNC: 19 U/L (ref 0–45)
BILIRUB DIRECT SERPL-MCNC: <0.2 MG/DL (ref 0–0.3)
BILIRUB SERPL-MCNC: 0.2 MG/DL
BUN SERPL-MCNC: 12.8 MG/DL (ref 6–20)
CALCIUM SERPL-MCNC: 9.1 MG/DL (ref 8.6–10)
CHLORIDE SERPL-SCNC: 104 MMOL/L (ref 98–107)
CREAT SERPL-MCNC: 0.86 MG/DL (ref 0.51–0.95)
DEPRECATED HCO3 PLAS-SCNC: 22 MMOL/L (ref 22–29)
EGFRCR SERPLBLD CKD-EPI 2021: 85 ML/MIN/1.73M2
ERYTHROCYTE [DISTWIDTH] IN BLOOD BY AUTOMATED COUNT: 21.1 % (ref 10–15)
ERYTHROCYTE [SEDIMENTATION RATE] IN BLOOD BY WESTERGREN METHOD: 23 MM/HR (ref 0–20)
FERRITIN SERPL-MCNC: 27 NG/ML (ref 6–175)
GLUCOSE SERPL-MCNC: 92 MG/DL (ref 70–99)
HCT VFR BLD AUTO: 34.9 % (ref 35–47)
HGB BLD-MCNC: 10.8 G/DL (ref 11.7–15.7)
MCH RBC QN AUTO: 28.7 PG (ref 26.5–33)
MCHC RBC AUTO-ENTMCNC: 30.9 G/DL (ref 31.5–36.5)
MCV RBC AUTO: 93 FL (ref 78–100)
PLATELET # BLD AUTO: 211 10E3/UL (ref 150–450)
POTASSIUM SERPL-SCNC: 4.4 MMOL/L (ref 3.4–5.3)
PROT SERPL-MCNC: 7.4 G/DL (ref 6.4–8.3)
RBC # BLD AUTO: 3.76 10E6/UL (ref 3.8–5.2)
SODIUM SERPL-SCNC: 136 MMOL/L (ref 135–145)
TSH SERPL DL<=0.005 MIU/L-ACNC: 2.74 UIU/ML (ref 0.3–4.2)
URATE SERPL-MCNC: 4.5 MG/DL (ref 2.4–5.7)
WBC # BLD AUTO: 3.7 10E3/UL (ref 4–11)

## 2023-10-30 PROCEDURE — 85652 RBC SED RATE AUTOMATED: CPT

## 2023-10-30 PROCEDURE — 84550 ASSAY OF BLOOD/URIC ACID: CPT

## 2023-10-30 PROCEDURE — 85027 COMPLETE CBC AUTOMATED: CPT

## 2023-10-30 PROCEDURE — 80053 COMPREHEN METABOLIC PANEL: CPT

## 2023-10-30 PROCEDURE — 84443 ASSAY THYROID STIM HORMONE: CPT

## 2023-10-30 PROCEDURE — 82728 ASSAY OF FERRITIN: CPT

## 2023-10-30 PROCEDURE — 36415 COLL VENOUS BLD VENIPUNCTURE: CPT

## 2023-10-30 PROCEDURE — 82248 BILIRUBIN DIRECT: CPT

## 2023-10-30 PROCEDURE — 86038 ANTINUCLEAR ANTIBODIES: CPT

## 2023-10-30 PROCEDURE — 83921 ORGANIC ACID SINGLE QUANT: CPT

## 2023-10-31 LAB — ANA SER QL IF: NEGATIVE

## 2023-11-02 LAB — METHYLMALONATE SERPL-SCNC: 0.21 UMOL/L (ref 0–0.4)

## 2023-11-03 ENCOUNTER — THERAPY VISIT (OUTPATIENT)
Dept: PHYSICAL THERAPY | Facility: REHABILITATION | Age: 43
End: 2023-11-03
Payer: COMMERCIAL

## 2023-11-03 DIAGNOSIS — M54.50 ACUTE BILATERAL LOW BACK PAIN WITHOUT SCIATICA: ICD-10-CM

## 2023-11-03 DIAGNOSIS — M54.50 BILATERAL LOW BACK PAIN WITHOUT SCIATICA: Primary | ICD-10-CM

## 2023-11-03 DIAGNOSIS — M54.6 ACUTE BILATERAL THORACIC BACK PAIN: ICD-10-CM

## 2023-11-03 DIAGNOSIS — M54.2 NECK PAIN: ICD-10-CM
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
## 2023-11-03 PROCEDURE — 97110 THERAPEUTIC EXERCISES: CPT | Mod: GP
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
## 2023-11-10 ENCOUNTER — THERAPY VISIT (OUTPATIENT)
Dept: PHYSICAL THERAPY | Facility: REHABILITATION | Age: 43
End: 2023-11-10
Payer: COMMERCIAL
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
## 2023-11-10 DIAGNOSIS — M54.50 BILATERAL LOW BACK PAIN WITHOUT SCIATICA: Primary | ICD-10-CM

## 2023-11-10 DIAGNOSIS — M54.50 ACUTE BILATERAL LOW BACK PAIN WITHOUT SCIATICA: ICD-10-CM

## 2023-11-10 DIAGNOSIS — M54.2 NECK PAIN: ICD-10-CM

## 2023-11-10 DIAGNOSIS — M54.6 ACUTE BILATERAL THORACIC BACK PAIN: ICD-10-CM

## 2023-11-10 PROCEDURE — 97110 THERAPEUTIC EXERCISES: CPT | Mod: GP

## 2023-11-10 PROCEDURE — 97112 NEUROMUSCULAR REEDUCATION: CPT | Mod: GP

## 2023-11-17 ENCOUNTER — THERAPY VISIT (OUTPATIENT)
Dept: PHYSICAL THERAPY | Facility: REHABILITATION | Age: 43
End: 2023-11-17
Payer: COMMERCIAL
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
## 2023-11-17 DIAGNOSIS — M54.6 ACUTE BILATERAL THORACIC BACK PAIN: ICD-10-CM

## 2023-11-17 DIAGNOSIS — M54.50 ACUTE BILATERAL LOW BACK PAIN WITHOUT SCIATICA: ICD-10-CM

## 2023-11-17 DIAGNOSIS — M54.50 BILATERAL LOW BACK PAIN WITHOUT SCIATICA: Primary | ICD-10-CM

## 2023-11-17 DIAGNOSIS — M54.2 NECK PAIN: ICD-10-CM

## 2023-11-17 PROCEDURE — 97110 THERAPEUTIC EXERCISES: CPT | Mod: GP

## 2023-11-17 PROCEDURE — 97112 NEUROMUSCULAR REEDUCATION: CPT | Mod: GP

## 2023-12-07 ENCOUNTER — TELEPHONE (OUTPATIENT)
Dept: INTERNAL MEDICINE | Facility: CLINIC | Age: 43
End: 2023-12-07

## 2023-12-07 DIAGNOSIS — L30.9 DERMATITIS: ICD-10-CM

## 2023-12-07 RX ORDER — TRIAMCINOLONE ACETONIDE 1 MG/G
CREAM TOPICAL
Qty: 45 G | Refills: 1 | Status: SHIPPED | OUTPATIENT
Start: 2023-12-07

## 2023-12-07 NOTE — TELEPHONE ENCOUNTER
Medication Question or Refill        What medication are you calling about (include dose and sig)?:     triamcinolone (KENALOG) 0.1 % external cream       Preferred Pharmacy:     [id number] - [place], [place] - [address] [place] [address]  Phone: [phone] Fax: [phone]    Controlled Substance Agreement on file:   CSA -- Patient Level:    CSA: None found at the patient level.       Who prescribed the medication?: KATELIN Juarez    Do you have any questions or concerns?  Yes: Pharmacy would like for provider to provide application site on body for insurance purpose.

## 2024-01-05 ENCOUNTER — THERAPY VISIT (OUTPATIENT)
Dept: PHYSICAL THERAPY | Facility: REHABILITATION | Age: 44
End: 2024-01-05
Payer: COMMERCIAL

## 2024-01-05 DIAGNOSIS — M54.50 BILATERAL LOW BACK PAIN WITHOUT SCIATICA: ICD-10-CM

## 2024-01-05 DIAGNOSIS — M54.50 ACUTE BILATERAL LOW BACK PAIN WITHOUT SCIATICA: ICD-10-CM

## 2024-01-05 DIAGNOSIS — M54.2 NECK PAIN: Primary | ICD-10-CM

## 2024-01-05 DIAGNOSIS — M54.6 ACUTE BILATERAL THORACIC BACK PAIN: ICD-10-CM

## 2024-01-05 PROCEDURE — 97110 THERAPEUTIC EXERCISES: CPT | Mod: GP
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
## 2024-01-10 DIAGNOSIS — E06.3 HASHIMOTO'S DISEASE: ICD-10-CM

## 2024-01-10 RX ORDER — LEVOTHYROXINE SODIUM 137 UG/1
137 TABLET ORAL DAILY
Qty: 90 TABLET | Refills: 1 | Status: SHIPPED | OUTPATIENT
Start: 2024-01-10

## 2024-01-10 NOTE — TELEPHONE ENCOUNTER
Medication Question or Refill        What medication are you calling about (include dose and sig)?:   levothyroxine (SYNTHROID/LEVOTHROID) 137 MCG tablet     Preferred Pharmacy:     [facility] - [place], [place] - [address] [place] [address]  Phone: [phone] Fax: [phone]    Controlled Substance Agreement on file:   CSA -- Patient Level:    CSA: None found at the patient level.       Who prescribed the medication?: KATELIN Juarez    Do you need a refill? Yes    When did you use the medication last? NA    Patient offered an appointment? No    Do you have any questions or concerns?  No

## 2024-01-26 ENCOUNTER — THERAPY VISIT (OUTPATIENT)
Dept: PHYSICAL THERAPY | Facility: REHABILITATION | Age: 44
End: 2024-01-26
Payer: COMMERCIAL

## 2024-01-26 DIAGNOSIS — M54.2 NECK PAIN: Primary | ICD-10-CM

## 2024-01-26 DIAGNOSIS — M54.50 BILATERAL LOW BACK PAIN WITHOUT SCIATICA: ICD-10-CM

## 2024-01-26 DIAGNOSIS — M54.6 ACUTE BILATERAL THORACIC BACK PAIN: ICD-10-CM

## 2024-01-26 DIAGNOSIS — M54.50 ACUTE BILATERAL LOW BACK PAIN WITHOUT SCIATICA: ICD-10-CM

## 2024-01-26 PROCEDURE — 97110 THERAPEUTIC EXERCISES: CPT | Mod: GP

## 2024-01-26 PROCEDURE — 97140 MANUAL THERAPY 1/> REGIONS: CPT | Mod: GP

## 2024-02-10 ENCOUNTER — HEALTH MAINTENANCE LETTER (OUTPATIENT)
Age: 44
End: 2024-02-10

## 2024-03-01 ENCOUNTER — THERAPY VISIT (OUTPATIENT)
Dept: PHYSICAL THERAPY | Facility: REHABILITATION | Age: 44
End: 2024-03-01

## 2024-03-01 DIAGNOSIS — M54.2 NECK PAIN: Primary | ICD-10-CM

## 2024-03-01 DIAGNOSIS — M54.6 ACUTE BILATERAL THORACIC BACK PAIN: ICD-10-CM

## 2024-03-01 DIAGNOSIS — M54.50 ACUTE BILATERAL LOW BACK PAIN WITHOUT SCIATICA: ICD-10-CM

## 2024-03-01 DIAGNOSIS — M54.50 BILATERAL LOW BACK PAIN WITHOUT SCIATICA: ICD-10-CM

## 2024-03-01 PROCEDURE — 97110 THERAPEUTIC EXERCISES: CPT | Mod: GP
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
## 2024-03-13 DIAGNOSIS — D64.9 ANEMIA REQUIRING TRANSFUSIONS: Primary | ICD-10-CM
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
## 2025-03-02 ENCOUNTER — HEALTH MAINTENANCE LETTER (OUTPATIENT)
Age: 45
End: 2025-03-02

## 2025-05-06 ENCOUNTER — VIRTUAL VISIT (OUTPATIENT)
Dept: FAMILY MEDICINE | Facility: CLINIC | Age: 45
End: 2025-05-06

## 2025-05-06 DIAGNOSIS — D64.9 ANEMIA REQUIRING TRANSFUSIONS: ICD-10-CM

## 2025-05-06 DIAGNOSIS — E06.3 HASHIMOTO'S DISEASE: Primary | ICD-10-CM

## 2025-05-06 DIAGNOSIS — E05.00 GRAVES DISEASE: ICD-10-CM

## 2025-05-06 PROCEDURE — 98005 SYNCH AUDIO-VIDEO EST LOW 20: CPT | Performed by: PHYSICIAN ASSISTANT

## 2025-05-06 RX ORDER — LEVOTHYROXINE SODIUM 137 UG/1
137 TABLET ORAL DAILY
Qty: 90 TABLET | Refills: 4 | Status: SHIPPED | OUTPATIENT
Start: 2025-05-06

## 2025-05-06 ASSESSMENT — PATIENT HEALTH QUESTIONNAIRE - PHQ9
SUM OF ALL RESPONSES TO PHQ QUESTIONS 1-9: 12
10. IF YOU CHECKED OFF ANY PROBLEMS, HOW DIFFICULT HAVE THESE PROBLEMS MADE IT FOR YOU TO DO YOUR WORK, TAKE CARE OF THINGS AT HOME, OR GET ALONG WITH OTHER PEOPLE: VERY DIFFICULT
SUM OF ALL RESPONSES TO PHQ QUESTIONS 1-9: 12

## 2025-05-06 NOTE — PROGRESS NOTES
oBnnie is a 44 year old who is being evaluated via a billable video visit.    How would you like to obtain your AVS? MyChart  If the video visit is dropped, the invitation should be resent by:   Will anyone else be joining your video visit? No      Assessment & Plan     Hashimoto's disease  -has been taking her thyroid medications.  Reffils sent  -she will follow-up for repeat labs when able.  I will notify pt of results when available   - TSH with free T4 reflex; Future  - levothyroxine (SYNTHROID/LEVOTHROID) 137 MCG tablet; Take 1 tablet (137 mcg) by mouth daily.    Graves disease  - TSH with free T4 reflex; Future    Anemia requiring transfusions  -recheck HGB.  Has needed infusions in the past.  Feeling ok   - Hemoglobin; Future  - Ferritin; Future  - Iron and iron binding capacity; Future          Nicotine/Tobacco Cessation  She reports that she has been smoking cigarettes. She has been exposed to tobacco smoke. She has never used smokeless tobacco.  Nicotine/Tobacco Cessation Plan  Information offered: Patient not interested at this time    20 minutes spent by me on the date of the encounter doing chart review, patient visit, and documentation     Subjective   Bonnie is a 44 year old, presenting for the following health issues:  Recheck Medication (refill)      5/6/2025     4:46 PM   Additional Questions   Roomed by [facility]     Video Start Time:     History of Present Illness       Reason for visit:  Thyroid medication    She eats 2-3 servings of fruits and vegetables daily.She consumes 2 sweetened beverage(s) daily.She exercises with enough effort to increase her heart rate 30 to 60 minutes per day.  She exercises with enough effort to increase her heart rate 7 days per week.   She is taking medications regularly.        -pt seen for follow up  -h/o hypothyroidism and has been on levothyroxine for years  -has had period of non compliance but this has not been an issue for a while, per pt.  She is taking this  daily and feeling good.      -also has a hx of severe iron deficient anemia and has required iron infusions in the past.    -she denies much SOB or fatigue.  Due to recheck this      Objective           Vitals:  No vitals were obtained today due to virtual visit.    Physical Exam   GENERAL: alert and no distress  EYES: Eyes grossly normal to inspection.  No discharge or erythema, or obvious scleral/conjunctival abnormalities.  RESP: No audible wheeze, cough, or visible cyanosis.    SKIN: Visible skin clear. No significant rash, abnormal pigmentation or lesions.  NEURO: Cranial nerves grossly intact.  Mentation and speech appropriate for age.  PSYCH: Appropriate affect, tone, and pace of words        Video-Visit Details    Type of service:  Video Visit   Video End Time:  Originating Location (pt. Location): Home    Distant Location (provider location):  On-site  Platform used for Video Visit: Corinne (Telehealth)  Signed Electronically by: Melinda Giles PA-C

## (undated) DEVICE — LINEN ORTHO ACL PACK 5447

## (undated) DEVICE — DRAIN JACKSON PRATT RESERVOIR 100ML SU130-1305

## (undated) DEVICE — ESU LIGASURE DISSECTOR EXACT LF2019

## (undated) DEVICE — SOL NACL 0.9% IRRIG 1000ML BOTTLE 2F7124

## (undated) DEVICE — LINEN HALF SHEET 5512

## (undated) DEVICE — SU VICRYL 3-0 SH 27" UND J416H

## (undated) DEVICE — PROBE NEUROSIGN MAGSTIM BIPOLAR 3601-00-TE

## (undated) DEVICE — LINEN FULL SHEET 5511

## (undated) DEVICE — CATH TRAY FOLEY SURESTEP 16FR W/URINE MTR STATLK LF A303416A

## (undated) DEVICE — DRAIN JACKSON PRATT 10FR ROUND SU130-1321

## (undated) DEVICE — GLOVE PROTEXIS POWDER FREE 6.5 ORTHOPEDIC 2D73ET65

## (undated) DEVICE — BAG CLEAR TRASH 1.3M 39X33" P4040C

## (undated) DEVICE — SU MONOCRYL 4-0 P-3 18" UND Y494G

## (undated) DEVICE — ESU PENCIL W/HOLSTER E2350H

## (undated) DEVICE — PACK MAJOR HEAD AND NECK RIDGES

## (undated) DEVICE — SU SILK 3-0 SH 30" K832H

## (undated) DEVICE — SUCTION CANISTER MEDIVAC LINER 3000ML W/LID 65651-530

## (undated) DEVICE — SU VICRYL 2-0 TIE 12X18" J905T

## (undated) DEVICE — SU VICRYL 4-0 TIE 12X18" DYED J103T

## (undated) DEVICE — ESU GROUND PAD ADULT W/CORD E7507

## (undated) RX ORDER — HYDRALAZINE HYDROCHLORIDE 20 MG/ML
INJECTION INTRAMUSCULAR; INTRAVENOUS
Status: DISPENSED
Start: 2021-01-18

## (undated) RX ORDER — CEFAZOLIN SODIUM 2 G/100ML
INJECTION, SOLUTION INTRAVENOUS
Status: DISPENSED
Start: 2021-01-18

## (undated) RX ORDER — BUPIVACAINE HYDROCHLORIDE 2.5 MG/ML
INJECTION, SOLUTION EPIDURAL; INFILTRATION; INTRACAUDAL
Status: DISPENSED
Start: 2021-01-18

## (undated) RX ORDER — FENTANYL CITRATE-0.9 % NACL/PF 10 MCG/ML
PLASTIC BAG, INJECTION (ML) INTRAVENOUS
Status: DISPENSED
Start: 2021-01-18

## (undated) RX ORDER — FENTANYL CITRATE 50 UG/ML
INJECTION, SOLUTION INTRAMUSCULAR; INTRAVENOUS
Status: DISPENSED
Start: 2021-01-18

## (undated) RX ORDER — ONDANSETRON 2 MG/ML
INJECTION INTRAMUSCULAR; INTRAVENOUS
Status: DISPENSED
Start: 2021-01-18